# Patient Record
Sex: MALE | Race: WHITE | Employment: OTHER | URBAN - METROPOLITAN AREA
[De-identification: names, ages, dates, MRNs, and addresses within clinical notes are randomized per-mention and may not be internally consistent; named-entity substitution may affect disease eponyms.]

---

## 2018-02-20 PROBLEM — K55.069 MESENTERIC VEIN THROMBOSIS (HCC): Status: ACTIVE | Noted: 2018-02-20

## 2018-02-26 ENCOUNTER — OFFICE VISIT (OUTPATIENT)
Dept: FAMILY MEDICINE CLINIC | Age: 63
End: 2018-02-26

## 2018-02-26 VITALS
SYSTOLIC BLOOD PRESSURE: 128 MMHG | DIASTOLIC BLOOD PRESSURE: 70 MMHG | HEART RATE: 64 BPM | WEIGHT: 197.8 LBS | HEIGHT: 71 IN | BODY MASS INDEX: 27.69 KG/M2 | OXYGEN SATURATION: 97 %

## 2018-02-26 DIAGNOSIS — Z11.59 ENCOUNTER FOR HEPATITIS C SCREENING TEST FOR LOW RISK PATIENT: ICD-10-CM

## 2018-02-26 DIAGNOSIS — E78.2 MIXED HYPERLIPIDEMIA: ICD-10-CM

## 2018-02-26 DIAGNOSIS — K55.069 MESENTERIC VEIN THROMBOSIS (HCC): Primary | ICD-10-CM

## 2018-02-26 DIAGNOSIS — Z11.4 SCREENING FOR HIV (HUMAN IMMUNODEFICIENCY VIRUS): ICD-10-CM

## 2018-02-26 DIAGNOSIS — I10 ESSENTIAL HYPERTENSION: ICD-10-CM

## 2018-02-26 DIAGNOSIS — R93.89 ABNORMAL CHEST CT: ICD-10-CM

## 2018-02-26 DIAGNOSIS — Z12.5 SCREENING PSA (PROSTATE SPECIFIC ANTIGEN): ICD-10-CM

## 2018-02-26 PROCEDURE — 99203 OFFICE O/P NEW LOW 30 MIN: CPT | Performed by: FAMILY MEDICINE

## 2018-02-26 ASSESSMENT — ENCOUNTER SYMPTOMS
RHINORRHEA: 0
COUGH: 0
DIARRHEA: 0
VOMITING: 0
EYE PAIN: 0
SINUS PRESSURE: 0
CHEST TIGHTNESS: 0
WHEEZING: 0
SHORTNESS OF BREATH: 0
BLOOD IN STOOL: 0
CONSTIPATION: 0
EYE DISCHARGE: 0

## 2018-02-26 NOTE — PATIENT INSTRUCTIONS

## 2018-02-27 ENCOUNTER — NURSE ONLY (OUTPATIENT)
Dept: FAMILY MEDICINE CLINIC | Age: 63
End: 2018-02-27

## 2018-02-27 DIAGNOSIS — E78.2 MIXED HYPERLIPIDEMIA: ICD-10-CM

## 2018-02-27 DIAGNOSIS — Z11.59 ENCOUNTER FOR HEPATITIS C SCREENING TEST FOR LOW RISK PATIENT: ICD-10-CM

## 2018-02-27 DIAGNOSIS — I10 ESSENTIAL HYPERTENSION: ICD-10-CM

## 2018-02-27 DIAGNOSIS — Z11.4 SCREENING FOR HIV (HUMAN IMMUNODEFICIENCY VIRUS): ICD-10-CM

## 2018-02-27 LAB
A/G RATIO: 1.8 (ref 1.1–2.2)
ALBUMIN SERPL-MCNC: 4.6 G/DL (ref 3.4–5)
ALP BLD-CCNC: 52 U/L (ref 40–129)
ALT SERPL-CCNC: 41 U/L (ref 10–40)
ANION GAP SERPL CALCULATED.3IONS-SCNC: 18 MMOL/L (ref 3–16)
AST SERPL-CCNC: 30 U/L (ref 15–37)
BILIRUB SERPL-MCNC: 0.6 MG/DL (ref 0–1)
BUN BLDV-MCNC: 26 MG/DL (ref 7–20)
CALCIUM SERPL-MCNC: 9.1 MG/DL (ref 8.3–10.6)
CHLORIDE BLD-SCNC: 100 MMOL/L (ref 99–110)
CHOLESTEROL, TOTAL: 123 MG/DL (ref 0–199)
CO2: 23 MMOL/L (ref 21–32)
CREAT SERPL-MCNC: 1.4 MG/DL (ref 0.8–1.3)
GFR AFRICAN AMERICAN: >60
GFR NON-AFRICAN AMERICAN: 51
GLOBULIN: 2.5 G/DL
GLUCOSE BLD-MCNC: 140 MG/DL (ref 70–99)
HDLC SERPL-MCNC: 33 MG/DL (ref 40–60)
HEPATITIS C ANTIBODY INTERPRETATION: NORMAL
LDL CHOLESTEROL CALCULATED: 51 MG/DL
POTASSIUM SERPL-SCNC: 4.6 MMOL/L (ref 3.5–5.1)
SODIUM BLD-SCNC: 141 MMOL/L (ref 136–145)
TOTAL PROTEIN: 7.1 G/DL (ref 6.4–8.2)
TRIGL SERPL-MCNC: 194 MG/DL (ref 0–150)
TSH REFLEX: 3.33 UIU/ML (ref 0.27–4.2)
VLDLC SERPL CALC-MCNC: 39 MG/DL

## 2018-02-27 PROCEDURE — 36415 COLL VENOUS BLD VENIPUNCTURE: CPT | Performed by: FAMILY MEDICINE

## 2018-02-27 ASSESSMENT — ENCOUNTER SYMPTOMS
ABDOMINAL PAIN: 1
COLOR CHANGE: 0

## 2018-02-28 LAB
HIV AG/AB: NORMAL
HIV ANTIGEN: NORMAL
HIV-1 ANTIBODY: NORMAL
HIV-2 AB: NORMAL

## 2018-03-02 DIAGNOSIS — R73.9 HYPERGLYCEMIA: Primary | ICD-10-CM

## 2018-03-05 ENCOUNTER — NURSE ONLY (OUTPATIENT)
Dept: FAMILY MEDICINE CLINIC | Age: 63
End: 2018-03-05

## 2018-03-05 DIAGNOSIS — R73.9 HYPERGLYCEMIA: ICD-10-CM

## 2018-03-05 PROCEDURE — 36415 COLL VENOUS BLD VENIPUNCTURE: CPT | Performed by: FAMILY MEDICINE

## 2018-03-06 LAB
ESTIMATED AVERAGE GLUCOSE: 154.2 MG/DL
HBA1C MFR BLD: 7 %

## 2018-03-08 ENCOUNTER — HOSPITAL ENCOUNTER (OUTPATIENT)
Dept: SURGERY | Age: 63
Discharge: HOME | End: 2018-03-08
Attending: INTERNAL MEDICINE | Admitting: INTERNAL MEDICINE

## 2018-03-08 VITALS
HEIGHT: 71 IN | OXYGEN SATURATION: 98 % | TEMPERATURE: 98.2 F | SYSTOLIC BLOOD PRESSURE: 163 MMHG | HEART RATE: 55 BPM | WEIGHT: 185 LBS | RESPIRATION RATE: 16 BRPM | DIASTOLIC BLOOD PRESSURE: 81 MMHG | BODY MASS INDEX: 25.9 KG/M2

## 2018-03-08 RX ORDER — SODIUM CHLORIDE, SODIUM LACTATE, POTASSIUM CHLORIDE, CALCIUM CHLORIDE 600; 310; 30; 20 MG/100ML; MG/100ML; MG/100ML; MG/100ML
INJECTION, SOLUTION INTRAVENOUS CONTINUOUS
Status: DISCONTINUED | OUTPATIENT
Start: 2018-03-08 | End: 2018-03-09 | Stop reason: HOSPADM

## 2018-03-08 RX ORDER — SODIUM CHLORIDE, SODIUM LACTATE, POTASSIUM CHLORIDE, CALCIUM CHLORIDE 600; 310; 30; 20 MG/100ML; MG/100ML; MG/100ML; MG/100ML
INJECTION, SOLUTION INTRAVENOUS ONCE
Status: DISCONTINUED | OUTPATIENT
Start: 2018-03-08 | End: 2018-03-08 | Stop reason: SDUPTHER

## 2018-03-08 RX ORDER — LIDOCAINE HYDROCHLORIDE 10 MG/ML
0.1 INJECTION, SOLUTION EPIDURAL; INFILTRATION; INTRACAUDAL; PERINEURAL
Status: DISCONTINUED | OUTPATIENT
Start: 2018-03-08 | End: 2018-03-08 | Stop reason: SDUPTHER

## 2018-03-08 RX ADMIN — SODIUM CHLORIDE, SODIUM LACTATE, POTASSIUM CHLORIDE, CALCIUM CHLORIDE: 600; 310; 30; 20 INJECTION, SOLUTION INTRAVENOUS at 08:08

## 2018-03-08 ASSESSMENT — PAIN SCALES - GENERAL
PAINLEVEL_OUTOF10: 0

## 2018-03-16 ENCOUNTER — OFFICE VISIT (OUTPATIENT)
Dept: FAMILY MEDICINE CLINIC | Age: 63
End: 2018-03-16

## 2018-03-16 VITALS
HEIGHT: 71 IN | SYSTOLIC BLOOD PRESSURE: 138 MMHG | HEART RATE: 62 BPM | DIASTOLIC BLOOD PRESSURE: 60 MMHG | WEIGHT: 193.4 LBS | BODY MASS INDEX: 27.07 KG/M2 | TEMPERATURE: 98.3 F | OXYGEN SATURATION: 99 %

## 2018-03-16 DIAGNOSIS — E11.9 TYPE 2 DIABETES MELLITUS WITHOUT COMPLICATION, WITHOUT LONG-TERM CURRENT USE OF INSULIN (HCC): ICD-10-CM

## 2018-03-16 DIAGNOSIS — H92.02 LEFT EAR PAIN: Primary | ICD-10-CM

## 2018-03-16 DIAGNOSIS — J02.9 ACUTE PHARYNGITIS, UNSPECIFIED ETIOLOGY: ICD-10-CM

## 2018-03-16 PROCEDURE — 99213 OFFICE O/P EST LOW 20 MIN: CPT | Performed by: FAMILY MEDICINE

## 2018-03-16 RX ORDER — AMOXICILLIN 500 MG/1
500 CAPSULE ORAL 2 TIMES DAILY
Qty: 20 CAPSULE | Refills: 0 | Status: SHIPPED | OUTPATIENT
Start: 2018-03-16 | End: 2018-03-26

## 2018-03-16 ASSESSMENT — ENCOUNTER SYMPTOMS
SHORTNESS OF BREATH: 0
WHEEZING: 0
CHEST TIGHTNESS: 0
SORE THROAT: 1
RHINORRHEA: 0
SINUS PRESSURE: 0
COUGH: 0

## 2018-03-16 NOTE — PATIENT INSTRUCTIONS

## 2018-03-16 NOTE — PROGRESS NOTES
 Total Bilirubin 02/27/2018 0.6     Alkaline Phosphatase 02/27/2018 52     ALT 02/27/2018 41*    AST 02/27/2018 30     Globulin 02/27/2018 2.5     Hep C Ab Interp 02/27/2018 Non-reactive     HIV Ag/Ab 02/27/2018 Non-Reactive     HIV-1 Antibody 02/27/2018 Non-Reactive     HIV ANTIGEN 02/27/2018 Non-Reactive     HIV-2 Ab 02/27/2018 Non-Reactive     TSH 02/27/2018 3.33    Admission on 02/20/2018, Discharged on 02/23/2018   Component Date Value    WBC 02/19/2018 14.2*    RBC 02/19/2018 5.48     Hemoglobin 02/19/2018 15.5     Hematocrit 02/19/2018 46.2     MCV 02/19/2018 84.3     MCH 02/19/2018 28.3     MCHC 02/19/2018 33.6     RDW 02/19/2018 13.4     Platelets 43/48/4738 223     MPV 02/19/2018 7.7     Neutrophils % 02/19/2018 91.0     Lymphocytes % 02/19/2018 4.6     Monocytes % 02/19/2018 3.8     Eosinophils % 02/19/2018 0.2     Basophils % 02/19/2018 0.4     Neutrophils # 02/19/2018 12.9*    Lymphocytes # 02/19/2018 0.7*    Monocytes # 02/19/2018 0.5     Eosinophils # 02/19/2018 0.0     Basophils # 02/19/2018 0.1     Sodium 02/19/2018 141     Potassium 02/19/2018 4.4     Chloride 02/19/2018 100     CO2 02/19/2018 26     Anion Gap 02/19/2018 15     Glucose 02/19/2018 192*    BUN 02/19/2018 25*    CREATININE 02/19/2018 1.4*    GFR Non- 02/19/2018 51*    GFR  02/19/2018 >60     Calcium 02/19/2018 9.5     Total Protein 02/19/2018 7.2     Alb 02/19/2018 4.4     Albumin/Globulin Ratio 02/19/2018 1.6     Total Bilirubin 02/19/2018 0.6     Alkaline Phosphatase 02/19/2018 33*    ALT 02/19/2018 19     AST 02/19/2018 19     Globulin 02/19/2018 2.8     Color, UA 02/20/2018 Yellow     Clarity, UA 02/20/2018 Clear     Glucose, Ur 02/20/2018 Negative     Bilirubin Urine 02/20/2018 Negative     Ketones, Urine 02/20/2018 TRACE*    Specific Gravity, UA 02/20/2018 1.020     Blood, Urine 02/20/2018 Negative     pH, UA 02/20/2018 7.0     Protein, UA 02/20/2018 Negative     Urobilinogen, Urine 02/20/2018 0.2     Nitrite, Urine 02/20/2018 Negative     Leukocyte Esterase, Urine 02/20/2018 Negative     Microscopic Examination 02/20/2018 Not Indicated     Urine Type 02/20/2018 Not Specified     Protime 02/19/2018 13.5*    INR 02/19/2018 1.19*    Lactic Acid 02/20/2018 2.3*    aPTT 02/20/2018 34.3     aPTT 02/20/2018 >248.0*    Specimen Status 02/20/2018 ZOIE     Lactic Acid 02/20/2018 1.6     aPTT 02/20/2018 74.4*    Prothrombin Mutation 02/20/2018 Negative     PT PCR Specimen 02/20/2018 Whole Blood     JAK2 V617F MUTATION 02/20/2018 0.0     PTT D 02/20/2018 97*    PT D 02/20/2018 15.9*    Thrombin Time 02/20/2018 61.7*    Reptilase Tm 02/20/2018 19.6     Ptt-D Lizzy Reflex 02/20/2018 46     dRVVT Screen 02/20/2018 31*    DRVVT,DIL 95/08/6256 Not Applicable     Lup Anticoag Interp 02/20/2018 See Note     Hex Phosph Neut Test 54/18/3391 Not Applicable     Ptt-Heparin Neutralized 02/20/2018 51*    PLT NEUTA 70/45/4950 Not Applicable     DRVVT Confirmation Test 37/58/2569 Not Applicable     Lipase 44/39/9188 19.0     WBC 02/21/2018 17.0*    RBC 02/21/2018 4.82     Hemoglobin 02/21/2018 13.6     Hematocrit 02/21/2018 40.6     MCV 02/21/2018 84.1     MCH 02/21/2018 28.2     MCHC 02/21/2018 33.6     RDW 02/21/2018 13.8     Platelets 00/87/0760 177     MPV 02/21/2018 7.9     Neutrophils % 02/21/2018 85.8     Lymphocytes % 02/21/2018 7.6     Monocytes % 02/21/2018 5.8     Eosinophils % 02/21/2018 0.3     Basophils % 02/21/2018 0.5     Neutrophils # 02/21/2018 14.6*    Lymphocytes # 02/21/2018 1.3     Monocytes # 02/21/2018 1.0     Eosinophils # 02/21/2018 0.0     Basophils # 02/21/2018 0.1     Sodium 02/21/2018 139     Potassium reflex Magnesi* 02/21/2018 4.1     Chloride 02/21/2018 104     CO2 02/21/2018 25     Anion Gap 02/21/2018 10     Glucose 02/21/2018 146*    BUN 02/21/2018 28*    CREATININE 02/21/2018 1.1

## 2018-04-30 PROBLEM — R10.9 CHRONIC ABDOMINAL PAIN: Status: ACTIVE | Noted: 2018-04-30

## 2018-04-30 PROBLEM — R10.9 CHRONIC ABDOMINAL PAIN: Chronic | Status: ACTIVE | Noted: 2018-04-30

## 2018-04-30 PROBLEM — G89.29 CHRONIC ABDOMINAL PAIN: Chronic | Status: ACTIVE | Noted: 2018-04-30

## 2018-04-30 PROBLEM — K55.069 MESENTERIC VEIN THROMBOSIS (HCC): Chronic | Status: ACTIVE | Noted: 2018-02-20

## 2018-04-30 PROBLEM — G89.29 CHRONIC ABDOMINAL PAIN: Status: ACTIVE | Noted: 2018-04-30

## 2018-09-21 ENCOUNTER — HOSPITAL ENCOUNTER (OUTPATIENT)
Dept: CT IMAGING | Age: 63
Discharge: HOME | End: 2018-09-21
Payer: COMMERCIAL

## 2018-09-21 ENCOUNTER — HOSPITAL ENCOUNTER (OUTPATIENT)
Age: 63
Discharge: HOME | End: 2018-09-21
Payer: COMMERCIAL

## 2018-09-21 DIAGNOSIS — I81 PORTAL VEIN THROMBOSIS: ICD-10-CM

## 2018-09-21 LAB
A/G RATIO: 1.7 (ref 1.1–2.2)
ALBUMIN SERPL-MCNC: 4.6 G/DL (ref 3.4–5)
ALP BLD-CCNC: 27 U/L (ref 40–129)
ALT SERPL-CCNC: 24 U/L (ref 10–40)
ANION GAP SERPL CALCULATED.3IONS-SCNC: 12 MMOL/L (ref 3–16)
AST SERPL-CCNC: 24 U/L (ref 15–37)
BILIRUB SERPL-MCNC: 1.1 MG/DL (ref 0–1)
BUN BLDV-MCNC: 23 MG/DL (ref 7–20)
CALCIUM SERPL-MCNC: 9.2 MG/DL (ref 8.3–10.6)
CHLORIDE BLD-SCNC: 101 MMOL/L (ref 99–110)
CO2: 27 MMOL/L (ref 21–32)
CREAT SERPL-MCNC: 1.2 MG/DL (ref 0.8–1.3)
GFR AFRICAN AMERICAN: >60
GFR NON-AFRICAN AMERICAN: >60
GLOBULIN: 2.7 G/DL
GLUCOSE BLD-MCNC: 129 MG/DL (ref 70–99)
POTASSIUM SERPL-SCNC: 4.1 MMOL/L (ref 3.5–5.1)
SODIUM BLD-SCNC: 140 MMOL/L (ref 136–145)
TOTAL PROTEIN: 7.3 G/DL (ref 6.4–8.2)

## 2018-09-21 PROCEDURE — 80053 COMPREHEN METABOLIC PANEL: CPT

## 2018-09-21 PROCEDURE — 36415 COLL VENOUS BLD VENIPUNCTURE: CPT

## 2018-09-21 PROCEDURE — 6360000004 HC RX CONTRAST MEDICATION: Performed by: INTERNAL MEDICINE

## 2018-09-21 PROCEDURE — 74177 CT ABD & PELVIS W/CONTRAST: CPT

## 2018-09-21 RX ADMIN — IOPAMIDOL 100 ML: 755 INJECTION, SOLUTION INTRAVENOUS at 09:53

## 2018-09-21 RX ADMIN — IOHEXOL 50 ML: 240 INJECTION, SOLUTION INTRATHECAL; INTRAVASCULAR; INTRAVENOUS; ORAL at 09:53

## 2019-04-04 NOTE — PROGRESS NOTES
Reason for follow up:  ACS, AF, influenza    Subjective:  Feeling better. SOB is slowly improving. No CP. Slept ok. Objective:    No distress. No events overnight. Scheduled Meds:   levofloxacin  750 mg Intravenous Q48H    nystatin   Topical BID    aspirin  81 mg Oral Daily    docusate sodium  100 mg Oral Daily    predniSONE  40 mg Oral Lunch    heparin flush  3 mL Intravenous 2 times per day    warfarin (COUMADIN) daily dosing (placeholder)   Other RX Placeholder    ipratropium-albuterol  3 mL Inhalation Q4H WA    atorvastatin  10 mg Oral Daily    metoprolol tartrate  25 mg Oral BID    levothyroxine  75 mcg Oral Daily    budesonide  1 mg Nebulization BID    formoterol  20 mcg Nebulization Q12H         Intake/Output Summary (Last 24 hours) at 4/4/2019 1011  Last data filed at 4/4/2019 0531  Gross per 24 hour   Intake 458 ml   Output 0 ml   Net 458 ml       Patient Vitals for the past 96 hrs (Last 3 readings):   Weight   04/04/19 0531 88 lb 12.8 oz (40.3 kg)   04/03/19 0600 92 lb 1.6 oz (41.8 kg)   04/02/19 0542 103 lb 9.9 oz (47 kg)          PE:   BP (!) 134/90   Pulse 86   Temp 98.1 °F (36.7 °C) (Temporal)   Resp 18   Ht 5' (1.524 m)   Wt 88 lb 12.8 oz (40.3 kg)   SpO2 100%   BMI 17.34 kg/m²   CONST: In general, this is a well developed, elderly female who appears of stated age. Awake, alert, cooperative, no apparent distress. Throat: Oral mucosa pink and moist.  HEENT: Head- normocephalic, atraumatic; Eyes:conjunctivae pink, no noted xanthomas. Neck: no stridor, no noted enlargement of the thyroid,symmetric, no tenderness, no jugular venous distention. No carotid bruit noted. CHEST: Symmetrical and non-tender to palpation. No noted accessory muscle use or intercostal retractions. LUNGS: diminished AE b/l; scattered creps; diffuse ronchi.    CARDIOVASCULAR:   Heart Inspection shows no noted pulsations  Heart Palpation: no heaves or thrills, PMI in 5th Subjective:      Patient ID: Tiffany Brooke is a 60 y.o. male. HPI  Chief Complaint   Patient presents with    New Patient     Patient is here to follow up after a recent discharge from [facility] on 2/23/18.  Follow-Up from [address] [facility] with HLD,HTN. Moved from [place] last year. Was being seen by [facility] but with new insurance so has to switch   presented to [facility] with several weeks of abd pain, worsened by food intake. No prior hx of abd pathology, he has hx of HTN. CT in ED showed mesenteric and portal vein thrombosis, started on heparin gtt.  No prior hx or FH of thrombosis. No hx of cancer, tobacco usage. Mike Byers is a 60 y.o. 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 20 tablet 0    rivaroxaban (XARELTO STARTER PACK) 15 & 20 MG Starter Pack Use as directed 1 each 0    simvastatin (ZOCOR) 20 MG tablet Take 20 mg by mouth nightly      lisinopril (PRINIVIL;ZESTRIL) 10 MG tablet Take 10 mg by mouth daily      fenofibrate (TRICOR) 145 MG tablet Take 145 mg by mouth daily       No current facility-administered medications for this visit. Allergies: Patient has no known allergies.   Past Medical History:   Diagnosis Date    Hyperlipidemia     Hypertension     Thrombosis      Past Surgical History:   Procedure Laterality Date    ANKLE SURGERY      left    SHOULDER SURGERY Right 2012    TONSILLECTOMY      WRIST SURGERY      right     Family History   Problem Relation Age of Onset    Breast Cancer Mother     Coronary Art Dis Father     Diabetes Sister      Social History   Substance Use Topics    Smoking status: Never Smoker    Smokeless tobacco: ISC near left midclavicular line   Heart Ausculation -Normal S1 and S2, RRR, no murmur, s3, s4 or rub noted. PV: no extremity edema. No varicosities. Pedal pulses palpable, no clubbing or cyanosis   ABDOMEN: Soft, non-tender to light palpation. Bowel sounds present. No palpable masses no hepatosplenomegaly or splenomegaly; no abdominal bruit / pulsation  MS: Good muscle strength and tone. Not atrophy or abnormal movements. : deferred. SKIN: Warm and dry no statis dermatitis or ulcers. NEURO / PSYCH: Oriented to person, place and time. Speech clear and appropriate. Follows all commands. Pleasant affect. Monitor: AF      Lab Review       Recent Labs     04/02/19  0440 04/03/19  0540   WBC 13.6* 15.3*   HGB 8.5* 10.1*   HCT 28.3* 33.8*    403       Recent Labs     04/01/19  2036 04/02/19  0440 04/03/19  0540    144 147*   K 4.9 4.3 4.3    105 104   CO2 31* 28 28   PHOS  --  2.5 2.2*   BUN 43* 40* 43*   CREATININE 1.0 1.0 0.9       Recent Labs     04/03/19  0540   AST 39*   ALT 58*   ALKPHOS 96           Last 3 Troponin:    Lab Results   Component Value Date    TROPONINI 0.23 04/02/2019    TROPONINI 0.09 04/01/2019    TROPONINI 0.04 03/22/2019         Recent Labs     04/02/19  0440 04/03/19  0540 04/04/19  0713   INR 4.9 5.4* 5.8*     Assessment:  1. Presented with hypoxic respiratory failure in setting of + meta-pneumovirus. Improved. Presently on high flow NC. Respiratory status improving. No CHF on exam. Steriod taper, prn BIPAP. Pulmonary following. 2. CP with CHRISTA on EKG, 4/1/19: no aggressive measures as per family. Will therefore pursue medical therapy. ASA, BB. Probably stent thrombosis of the prior RCA stent. Presently symptom free. Enzyme pattern not consistent with large MI.   3. CAD s/p PCI to RCA 2004. On BB and lipitor as OP. 4. PAF: on coumadin. Was SR on presentation. AF on last EKG. INR high. 5. HTN: controlled presently. Was hypotensive during the acute episode. Never Used    Alcohol use Yes      Comment: social     Vitals:    02/26/18 1434   BP: 128/70   Site: Left Arm   Position: Sitting   Pulse: 64   SpO2: 97%   Weight: 197 lb 12.8 oz (89.7 kg)   Height: 5' 11\" (1.803 m)     Body mass index is 27.59 kg/m².      Wt Readings from Last 3 Encounters:   02/26/18 197 lb 12.8 oz (89.7 kg)   02/19/18 199 lb (90.3 kg)     BP Readings from Last 3 Encounters:   02/26/18 128/70   02/23/18 (!) 156/82      Lab Review   Admission on 02/20/2018, Discharged on 02/23/2018   Component Date Value    WBC 02/19/2018 14.2*    RBC 02/19/2018 5.48     Hemoglobin 02/19/2018 15.5     Hematocrit 02/19/2018 46.2     MCV 02/19/2018 84.3     MCH 02/19/2018 28.3     MCHC 02/19/2018 33.6     RDW 02/19/2018 13.4     Platelets 96/28/9819 223     MPV 02/19/2018 7.7     Neutrophils % 02/19/2018 91.0     Lymphocytes % 02/19/2018 4.6     Monocytes % 02/19/2018 3.8     Eosinophils % 02/19/2018 0.2     Basophils % 02/19/2018 0.4     Neutrophils # 02/19/2018 12.9*    Lymphocytes # 02/19/2018 0.7*    Monocytes # 02/19/2018 0.5     Eosinophils # 02/19/2018 0.0     Basophils # 02/19/2018 0.1     Sodium 02/19/2018 141     Potassium 02/19/2018 4.4     Chloride 02/19/2018 100     CO2 02/19/2018 26     Anion Gap 02/19/2018 15     Glucose 02/19/2018 192*    BUN 02/19/2018 25*    CREATININE 02/19/2018 1.4*    GFR Non- 02/19/2018 51*    GFR  02/19/2018 >60     Calcium 02/19/2018 9.5     Total Protein 02/19/2018 7.2     Alb 02/19/2018 4.4     Albumin/Globulin Ratio 02/19/2018 1.6     Total Bilirubin 02/19/2018 0.6     Alkaline Phosphatase 02/19/2018 33*    ALT 02/19/2018 19     AST 02/19/2018 19     Globulin 02/19/2018 2.8     Color, UA 02/20/2018 Yellow     Clarity, UA 02/20/2018 Clear     Glucose, Ur 02/20/2018 Negative     Bilirubin Urine 02/20/2018 Negative     Ketones, Urine 02/20/2018 TRACE*    Specific Gravity, UA 02/20/2018 1.020     6. COPD on chronic home O2.   7. L parietal SDH secondary to fall 12/2018        Plan:  1. Continue BB, ASA  2. [id number] on hold secondary to INR  3. ECHO is pending  4. Pulmonary following for respiratory insufficiency        Dr. Clem Nagy MD.  [facility]. Glucose 02/21/2018 146*    BUN 02/21/2018 28*    CREATININE 02/21/2018 1.1     GFR Non- 02/21/2018 >60     GFR  02/21/2018 >60     Calcium 02/21/2018 8.0*    Total Protein 02/21/2018 6.2*    Alb 02/21/2018 3.5     Albumin/Globulin Ratio 02/21/2018 1.3     Total Bilirubin 02/21/2018 0.6     Alkaline Phosphatase 02/21/2018 30*    ALT 02/21/2018 12     AST 02/21/2018 14*    Globulin 02/21/2018 2.7     aPTT 02/21/2018 73.9*    PTT D 02/21/2018 120*    PT D 02/21/2018 16.2*    Thrombin Time 02/21/2018 40.3*    Reptilase Tm 02/21/2018 18.6     Ptt-D Lizzy Reflex 02/21/2018 55*    dRVVT Screen 02/21/2018 42     DRVVT,DIL 55/17/7712 Not Applicable     Lup Anticoag Interp 02/21/2018 See Note     Hex Phosph Neut Test 02/21/2018 Positive*    Ptt-Heparin Neutralized 02/21/2018 63*    PLT NEUTA 02/21/2018 Negative     DRVVT Confirmation Test 33/76/2225 Not Applicable     Anticardiolipin IgG 02/21/2018 2     Cardiolipin Ab IgM 02/21/2018 6     Beta-2 Microglobulin 02/21/2018 1.8     Factor V Leiden 02/21/2018 Negative     Specimen 02/21/2018 Whole Blood     PSA 02/21/2018 0.52     Beta-2 Glyco 1 IgG 02/21/2018 0     Beta-2 Glyco 1 IgM 02/21/2018 1      EXAMINATION:   CT OF THE CHEST WITHOUT CONTRAST 2/21/2018 9:34 am       TECHNIQUE:   CT of the chest was performed without the administration of intravenous   contrast. Multiplanar reformatted images are provided for review. Dose   modulation, iterative reconstruction, and/or weight based adjustment of the   mA/kV was utilized to reduce the radiation dose to as low as reasonably   achievable.       COMPARISON:   None.       HISTORY:   ORDERING PHYSICIAN PROVIDED HISTORY: WIDENING OF MEDIASTINUM OF X-RAY   TECHNOLOGIST PROVIDED HISTORY:   Technologist Provided Reason for Exam: widening of mediastinum of xray;   SMV/portal vein thrmobus. Screening for malig that could be driving this.    Acuity: Acute   Type of dizziness, seizures, syncope and headaches. Hematological: Negative for adenopathy. Does not bruise/bleed easily. Psychiatric/Behavioral: Negative for dysphoric mood and sleep disturbance. The patient is not nervous/anxious. Objective:   Physical Exam   Constitutional: He is oriented to person, place, and time. He appears well-developed and well-nourished. No distress. HENT:   Head: Normocephalic. Right Ear: External ear normal.   Left Ear: External ear normal.   Nose: Nose normal.   Mouth/Throat: Oropharynx is clear and moist. No oropharyngeal exudate. Eyes: Conjunctivae and EOM are normal. Pupils are equal, round, and reactive to light. No scleral icterus. Neck: Neck supple. No thyromegaly present. Cardiovascular: Normal rate, regular rhythm, normal heart sounds and intact distal pulses. No murmur heard. Pulmonary/Chest: Effort normal and breath sounds normal. He has no wheezes. Abdominal: Soft. Bowel sounds are normal. He exhibits no distension. There is no tenderness. There is no rebound and no guarding. Musculoskeletal: Normal range of motion. He exhibits no edema, tenderness or deformity. Lymphadenopathy:     He has no cervical adenopathy. Neurological: He is alert and oriented to person, place, and time. No cranial nerve deficit. Coordination normal.   Skin: Skin is warm and dry. No erythema. Psychiatric: He has a normal mood and affect.  His behavior is normal. Judgment and thought content normal.       Assessment:       Mesenteric thrombus- on xarelto, workup unrevealing for cause  Abn chest CT- no malignancy, b/l opacities- not treated for PNA, will recheck in 4 weeks  htn- stable on meds, check labs  hld- stable on meds, check labs          Plan:      Records from previous PCP- states has had immunizations  Reviewed Noah Palm   reconcilled meds  Orders Placed This Encounter   Procedures    LIPID PANEL     Standing Status:   Future     Standing Expiration Date:

## 2019-12-02 ENCOUNTER — HOSPITAL ENCOUNTER (OUTPATIENT)
Age: 64
Discharge: HOME | End: 2019-12-02
Payer: MEDICARE

## 2019-12-02 DIAGNOSIS — N18.30 CKD (CHRONIC KIDNEY DISEASE), STAGE III (HCC): ICD-10-CM

## 2019-12-02 LAB
ALBUMIN SERPL-MCNC: 5 G/DL (ref 3.4–5)
ANION GAP SERPL CALCULATED.3IONS-SCNC: 15 MMOL/L (ref 3–16)
BUN BLDV-MCNC: 27 MG/DL (ref 7–20)
CALCIUM SERPL-MCNC: 9.6 MG/DL (ref 8.3–10.6)
CHLORIDE BLD-SCNC: 100 MMOL/L (ref 99–110)
CO2: 25 MMOL/L (ref 21–32)
CREAT SERPL-MCNC: 1.3 MG/DL (ref 0.8–1.3)
CREATININE URINE: 178.2 MG/DL (ref 39–259)
GFR AFRICAN AMERICAN: >60
GFR NON-AFRICAN AMERICAN: 55
GLUCOSE BLD-MCNC: 107 MG/DL (ref 70–99)
MICROALBUMIN UR-MCNC: 1.4 MG/DL
MICROALBUMIN/CREAT UR-RTO: 7.9 MG/G (ref 0–30)
PHOSPHORUS: 3.1 MG/DL (ref 2.5–4.9)
POTASSIUM SERPL-SCNC: 4.6 MMOL/L (ref 3.5–5.1)
SODIUM BLD-SCNC: 140 MMOL/L (ref 136–145)

## 2019-12-02 PROCEDURE — 36415 COLL VENOUS BLD VENIPUNCTURE: CPT

## 2019-12-02 PROCEDURE — 80069 RENAL FUNCTION PANEL: CPT

## 2019-12-02 PROCEDURE — 82043 UR ALBUMIN QUANTITATIVE: CPT

## 2019-12-02 PROCEDURE — 82570 ASSAY OF URINE CREATININE: CPT

## 2019-12-09 ENCOUNTER — OFFICE VISIT (OUTPATIENT)
Dept: CARDIOLOGY CLINIC | Age: 64
End: 2019-12-09
Payer: MEDICARE

## 2019-12-09 VITALS
SYSTOLIC BLOOD PRESSURE: 148 MMHG | BODY MASS INDEX: 29.26 KG/M2 | DIASTOLIC BLOOD PRESSURE: 78 MMHG | WEIGHT: 209 LBS | HEART RATE: 67 BPM | HEIGHT: 71 IN | OXYGEN SATURATION: 97 %

## 2019-12-09 DIAGNOSIS — I10 ESSENTIAL HYPERTENSION: Primary | ICD-10-CM

## 2019-12-09 DIAGNOSIS — E78.2 MIXED HYPERLIPIDEMIA: ICD-10-CM

## 2019-12-09 PROCEDURE — 99203 OFFICE O/P NEW LOW 30 MIN: CPT | Performed by: INTERNAL MEDICINE

## 2019-12-09 PROCEDURE — 1036F TOBACCO NON-USER: CPT | Performed by: INTERNAL MEDICINE

## 2019-12-09 PROCEDURE — G8417 CALC BMI ABV UP PARAM F/U: HCPCS | Performed by: INTERNAL MEDICINE

## 2019-12-09 PROCEDURE — 1123F ACP DISCUSS/DSCN MKR DOCD: CPT | Performed by: INTERNAL MEDICINE

## 2019-12-09 PROCEDURE — G8427 DOCREV CUR MEDS BY ELIG CLIN: HCPCS | Performed by: INTERNAL MEDICINE

## 2019-12-09 PROCEDURE — 93000 ELECTROCARDIOGRAM COMPLETE: CPT | Performed by: INTERNAL MEDICINE

## 2019-12-09 PROCEDURE — 3017F COLORECTAL CA SCREEN DOC REV: CPT | Performed by: INTERNAL MEDICINE

## 2019-12-09 PROCEDURE — 4040F PNEUMOC VAC/ADMIN/RCVD: CPT | Performed by: INTERNAL MEDICINE

## 2019-12-09 PROCEDURE — G8484 FLU IMMUNIZE NO ADMIN: HCPCS | Performed by: INTERNAL MEDICINE

## 2019-12-09 RX ORDER — AMLODIPINE BESYLATE 2.5 MG/1
2.5 TABLET ORAL DAILY
Qty: 90 TABLET | Refills: 1 | Status: SHIPPED | OUTPATIENT
Start: 2019-12-09 | End: 2020-03-09 | Stop reason: SDUPTHER

## 2020-02-11 ENCOUNTER — TELEPHONE (OUTPATIENT)
Dept: CARDIOLOGY CLINIC | Age: 65
End: 2020-02-11

## 2020-02-11 RX ORDER — LISINOPRIL 5 MG/1
5 TABLET ORAL DAILY
Qty: 90 TABLET | Refills: 3 | Status: SHIPPED | OUTPATIENT
Start: 2020-02-11 | End: 2020-03-09 | Stop reason: SDUPTHER

## 2020-02-20 ENCOUNTER — HOSPITAL ENCOUNTER (OUTPATIENT)
Age: 65
Discharge: HOME | End: 2020-02-20
Payer: MEDICARE

## 2020-02-20 LAB
ALBUMIN SERPL-MCNC: 4.9 G/DL (ref 3.4–5)
ALP BLD-CCNC: 24 U/L (ref 40–129)
ALT SERPL-CCNC: 43 U/L (ref 10–40)
ANION GAP SERPL CALCULATED.3IONS-SCNC: 13 MMOL/L (ref 3–16)
AST SERPL-CCNC: 38 U/L (ref 15–37)
BILIRUB SERPL-MCNC: 0.7 MG/DL (ref 0–1)
BILIRUBIN DIRECT: <0.2 MG/DL (ref 0–0.3)
BILIRUBIN, INDIRECT: ABNORMAL MG/DL (ref 0–1)
BUN BLDV-MCNC: 28 MG/DL (ref 7–20)
CALCIUM SERPL-MCNC: 9.6 MG/DL (ref 8.3–10.6)
CHLORIDE BLD-SCNC: 104 MMOL/L (ref 99–110)
CHOLESTEROL, TOTAL: 130 MG/DL (ref 0–199)
CO2: 27 MMOL/L (ref 21–32)
CREAT SERPL-MCNC: 1.6 MG/DL (ref 0.8–1.3)
GFR AFRICAN AMERICAN: 53
GFR NON-AFRICAN AMERICAN: 44
GLUCOSE BLD-MCNC: 111 MG/DL (ref 70–99)
HDLC SERPL-MCNC: 41 MG/DL (ref 40–60)
LDL CHOLESTEROL CALCULATED: 67 MG/DL
PHOSPHORUS: 3 MG/DL (ref 2.5–4.9)
POTASSIUM SERPL-SCNC: 4.8 MMOL/L (ref 3.5–5.1)
SODIUM BLD-SCNC: 144 MMOL/L (ref 136–145)
TOTAL PROTEIN: 7.5 G/DL (ref 6.4–8.2)
TRIGL SERPL-MCNC: 112 MG/DL (ref 0–150)
VLDLC SERPL CALC-MCNC: 22 MG/DL

## 2020-02-20 PROCEDURE — 80048 BASIC METABOLIC PNL TOTAL CA: CPT

## 2020-02-20 PROCEDURE — 36415 COLL VENOUS BLD VENIPUNCTURE: CPT

## 2020-02-20 PROCEDURE — 80061 LIPID PANEL: CPT

## 2020-02-20 PROCEDURE — 84100 ASSAY OF PHOSPHORUS: CPT

## 2020-02-20 PROCEDURE — 80076 HEPATIC FUNCTION PANEL: CPT

## 2020-02-21 ENCOUNTER — TELEPHONE (OUTPATIENT)
Dept: CARDIOLOGY CLINIC | Age: 65
End: 2020-02-21

## 2020-03-09 ENCOUNTER — OFFICE VISIT (OUTPATIENT)
Dept: CARDIOLOGY CLINIC | Age: 65
End: 2020-03-09
Payer: MEDICARE

## 2020-03-09 VITALS
BODY MASS INDEX: 28.53 KG/M2 | DIASTOLIC BLOOD PRESSURE: 60 MMHG | HEART RATE: 55 BPM | OXYGEN SATURATION: 99 % | SYSTOLIC BLOOD PRESSURE: 112 MMHG | HEIGHT: 71 IN | WEIGHT: 203.8 LBS

## 2020-03-09 PROBLEM — N18.30 STAGE 3 CHRONIC KIDNEY DISEASE (HCC): Status: ACTIVE | Noted: 2020-03-09

## 2020-03-09 PROCEDURE — 3017F COLORECTAL CA SCREEN DOC REV: CPT | Performed by: NURSE PRACTITIONER

## 2020-03-09 PROCEDURE — 4040F PNEUMOC VAC/ADMIN/RCVD: CPT | Performed by: NURSE PRACTITIONER

## 2020-03-09 PROCEDURE — G8427 DOCREV CUR MEDS BY ELIG CLIN: HCPCS | Performed by: NURSE PRACTITIONER

## 2020-03-09 PROCEDURE — G8484 FLU IMMUNIZE NO ADMIN: HCPCS | Performed by: NURSE PRACTITIONER

## 2020-03-09 PROCEDURE — G8417 CALC BMI ABV UP PARAM F/U: HCPCS | Performed by: NURSE PRACTITIONER

## 2020-03-09 PROCEDURE — 99213 OFFICE O/P EST LOW 20 MIN: CPT | Performed by: NURSE PRACTITIONER

## 2020-03-09 PROCEDURE — 1123F ACP DISCUSS/DSCN MKR DOCD: CPT | Performed by: NURSE PRACTITIONER

## 2020-03-09 PROCEDURE — 1036F TOBACCO NON-USER: CPT | Performed by: NURSE PRACTITIONER

## 2020-03-09 RX ORDER — SIMVASTATIN 20 MG
20 TABLET ORAL NIGHTLY
Qty: 90 TABLET | Refills: 3 | Status: SHIPPED | OUTPATIENT
Start: 2020-03-09 | End: 2021-01-26 | Stop reason: SDUPTHER

## 2020-03-09 RX ORDER — AMLODIPINE BESYLATE 2.5 MG/1
2.5 TABLET ORAL DAILY
Qty: 90 TABLET | Refills: 3 | Status: SHIPPED | OUTPATIENT
Start: 2020-03-09 | End: 2020-09-16 | Stop reason: SDUPTHER

## 2020-03-09 RX ORDER — FENOFIBRATE 145 MG/1
145 TABLET, COATED ORAL DAILY
Qty: 90 TABLET | Refills: 3 | Status: SHIPPED | OUTPATIENT
Start: 2020-03-09 | End: 2021-01-26 | Stop reason: SDUPTHER

## 2020-03-09 RX ORDER — LISINOPRIL 5 MG/1
5 TABLET ORAL DAILY
Qty: 90 TABLET | Refills: 3 | Status: SHIPPED | OUTPATIENT
Start: 2020-03-09 | End: 2020-10-12 | Stop reason: SDUPTHER

## 2020-03-09 NOTE — PROGRESS NOTES
[address]   Cardiac Evaluation    Primary Care Doctor:  [facility] DO DOLORES    Chief Complaint   Patient presents with    3 Month Follow-Up     no new cardiac complaints        History of Present Illness:   I had the pleasure of seeing Rema Vinson in follow up for HTN, HLD as well as CKD3 and hx of mesenteric vein thrombosis. He was started on Norvasc 2.5 mg for hypertension. Blood work last month with lipid panel at goal but mildly worsening renal function. He follows with Dr. Mark Bello for CKD3. BP was elevated initially today then dropped with sitting for couple minutes. Wife is having shoulder surgery today. No problems with the amlodipine. His weight is down 6 lbs in past 3 months. He states he historically gains and loses 15 lbs in a cyclic pattern in fall/ winter. He has been retired for 13 years. His diet has improved since then as well as cut back on alcohol intake. He took a week of meloxicam per podiatrist for right toe arthritis but stopped this when renal function looked worse. Has appointment with Dr. Mark Bello next month. Rema Vinson describes symptoms including right foot pain but denies chest pain, dyspnea, palpitations, orthopnea, PND, fatigue, early saiety, edema, syncope. NYHA:   II  ACC/ AHA Stage:    B    Past Medical History:   has a past medical history of Hyperlipidemia, Hypertension, and Thrombosis. Surgical History:   has a past surgical history that includes Ankle surgery; Wrist surgery; Tonsillectomy; shoulder surgery (Right, 2012); and Colonoscopy (03/08/2017). Social History:   reports that he has never smoked. He has never used smokeless tobacco. He reports current alcohol use. He reports that he does not use drugs.    Family History:   Family History   Problem Relation Age of Onset    Breast Cancer Mother     Coronary Art Dis Father     Diabetes Sister        Home Medications:  Prior to Admission medications    Medication Sig Start Date End CAD patient on STATIN therapy:  Yes  6.  Patient with CHF and aFib on anticoagulation:  NA

## 2020-03-09 NOTE — PATIENT INSTRUCTIONS
1. No change in BP/ cholesterol medicines  2. Follow up with Dr. Tisha Jean next month as planned  3. Follow up with Dr. Rigoberto Soliman in 6 months        Ref.  Range 2/20/2020 10:45   Cholesterol, Total Latest Ref Range: 0 - 199 mg/dL 130   HDL Cholesterol Latest Ref Range: 40 - 60 mg/dL 41   LDL Calculated Latest Ref Range: <100 mg/dL 67   Triglycerides Latest Ref Range: 0 - 150 mg/dL 112

## 2020-03-09 NOTE — LETTER
[address]   Cardiac Evaluation    Primary Care Doctor:  [facility] DO DOLORES    Chief Complaint   Patient presents with    3 Month Follow-Up     no new cardiac complaints        History of Present Illness:   I had the pleasure of seeing Vanita Sims in follow up for HTN, HLD as well as CKD3 and hx of mesenteric vein thrombosis. He was started on Norvasc 2.5 mg for hypertension. Blood work last month with lipid panel at goal but mildly worsening renal function. He follows with Dr. Rosalinda Thurman for CKD3. BP was elevated initially today then dropped with sitting for couple minutes. Wife is having shoulder surgery today. No problems with the amlodipine. His weight is down 6 lbs in past 3 months. He states he historically gains and loses 15 lbs in a cyclic pattern in fall/ winter. He has been retired for 13 years. His diet has improved since then as well as cut back on alcohol intake. He took a week of meloxicam per podiatrist for right toe arthritis but stopped this when renal function looked worse. Has appointment with Dr. Rosalinda Thurman next month. Vanita Sims describes symptoms including right foot pain but denies chest pain, dyspnea, palpitations, orthopnea, PND, fatigue, early saiety, edema, syncope. NYHA:   II  ACC/ AHA Stage:    B    Past Medical History:   has a past medical history of Hyperlipidemia, Hypertension, and Thrombosis. Surgical History:   has a past surgical history that includes Ankle surgery; Wrist surgery; Tonsillectomy; shoulder surgery (Right, 2012); and Colonoscopy (03/08/2017). Social History:   reports that he has never smoked. He has never used smokeless tobacco. He reports current alcohol use. He reports that he does not use drugs.    Family History:   Family History   Problem Relation Age of Onset    Breast Cancer Mother     Coronary Art Dis Father     Diabetes Sister        Home Medications:  Prior to Admission medications Medication Sig Start Date End Date Taking? Authorizing Provider   simvastatin (ZOCOR) 20 MG tablet TAKE 1 TABLET BY MOUTH AT BEDTIME 3/3/20  Yes Eugene Bah MD   lisinopril (PRINIVIL;ZESTRIL) 5 MG tablet Take 1 tablet by mouth daily 2/11/20  Yes Reggie Jimenez MD   fenofibrate (TRICOR) 145 MG tablet TAKE 1 TABLET BY MOUTH ONCE DAILY 12/14/19  Yes Eugene Bah MD   amLODIPine (NORVASC) 2.5 MG tablet Take 1 tablet by mouth daily 12/9/19  Yes Reggie Jimenez MD        Allergies:  Patient has no known allergies. Review of Systems:   Review of Systems   Constitutional: Positive for activity change. Negative for appetite change. Eyes: Positive for visual disturbance. Respiratory: Negative for cough, shortness of breath and wheezing. Cardiovascular: Negative for chest pain, palpitations and leg swelling. Genitourinary: Negative. Musculoskeletal: Positive for arthralgias and joint swelling. Neurological: Negative for dizziness, syncope and weakness. Psychiatric/Behavioral: Negative for sleep disturbance. Physical Examination:    Vitals:    03/09/20 1034 03/09/20 1037   BP: (!) 140/60 112/60   Pulse: 55    SpO2: 99%    Weight: 203 lb 12.8 oz (92.4 kg)    Height: 5' 11\" (1.803 m)         Constitutional and General Appearance: Warm and dry, no apparent distress, normal coloration  HEENT:  Normocephalic, atraumatic  Respiratory:  · Normal excursion and expansion without use of accessory muscles  · Resp Auscultation: Clear to auscultation   Cardiovascular:  · The apical impulses not displaced  · Heart tones are crisp and normal  · JVP 8 cm H2O  · Regular rate and rhythm, normal S1S2, no m/g/r  · Peripheral pulses are symmetrical and full  · There is no clubbing, cyanosis of the extremities.   · No BLE edema  · Pedal Pulses: 2+ and equal   Abdomen:  · No masses or tenderness  · Liver/Spleen: No Abnormalities Noted  Neurological/Psychiatric:  · Alert and oriented in all spheres · Moves all extremities well  · Exhibits normal gait balance and coordination  · No abnormalities of mood, affect, memory, mentation, or behavior are noted    Lab Data:  Most recent lab results below reviewed in office    CBC:   Lab Results   Component Value Date    WBC 17.0 02/21/2018    WBC 14.2 02/19/2018    RBC 4.82 02/21/2018    RBC 5.48 02/19/2018    HGB 13.6 02/21/2018    HGB 15.5 02/19/2018    HCT 40.6 02/21/2018    HCT 46.2 02/19/2018    MCV 84.1 02/21/2018    MCV 84.3 02/19/2018    RDW 13.8 02/21/2018    RDW 13.4 02/19/2018     02/21/2018     02/19/2018     BMP:  Lab Results   Component Value Date     02/20/2020     12/02/2019     09/21/2018    K 4.8 02/20/2020    K 4.6 12/02/2019    K 4.1 09/21/2018    K 4.1 02/21/2018     02/20/2020     12/02/2019     09/21/2018    CO2 27 02/20/2020    CO2 25 12/02/2019    CO2 27 09/21/2018    PHOS 3.0 02/20/2020    PHOS 3.1 12/02/2019    BUN 28 02/20/2020    BUN 27 12/02/2019    BUN 23 09/21/2018    CREATININE 1.6 02/20/2020    CREATININE 1.3 12/02/2019    CREATININE 1.2 09/21/2018     BNP: No results found for: PROBNP  LIPID:   Lab Results   Component Value Date    TRIG 112 02/20/2020    TRIG 194 02/27/2018    HDL 41 02/20/2020    HDL 33 02/27/2018    LDLCALC 67 02/20/2020    LDLCALC 51 02/27/2018       Cardiac Imaging:    Assessment:    1. Essential hypertension    2. Mixed hyperlipidemia    3. Mesenteric vein thrombosis    4. Stage 3 chronic kidney disease ([address]          Plan:   1. No change in BP/ cholesterol medicines  2. Follow up with Dr. Zora Hills next month as planned  3. Follow up with Dr. Cherelle Seals in 6 months       I appreciate the opportunity of cooperating in the care of this individual.    Theda Schaumann, APRN - CNP, 3/9/2020, 10:49 AM       QUALITY MEASURES  1. Tobacco Cessation Counseling: NA  2. Retake of BP if >140/90:   NA  3.  Documentation to PCP/referring for new patient:  Sent to PCP at close

## 2020-03-11 ASSESSMENT — ENCOUNTER SYMPTOMS
SHORTNESS OF BREATH: 0
COUGH: 0
WHEEZING: 0

## 2020-04-08 ENCOUNTER — HOSPITAL ENCOUNTER (OUTPATIENT)
Age: 65
Discharge: HOME | End: 2020-04-08
Payer: MEDICARE

## 2020-04-08 LAB
ALBUMIN SERPL-MCNC: 4.7 G/DL (ref 3.4–5)
ANION GAP SERPL CALCULATED.3IONS-SCNC: 14 MMOL/L (ref 3–16)
BUN BLDV-MCNC: 23 MG/DL (ref 7–20)
CALCIUM SERPL-MCNC: 9.7 MG/DL (ref 8.3–10.6)
CHLORIDE BLD-SCNC: 102 MMOL/L (ref 99–110)
CO2: 25 MMOL/L (ref 21–32)
CREAT SERPL-MCNC: 1.6 MG/DL (ref 0.8–1.3)
GFR AFRICAN AMERICAN: 53
GFR NON-AFRICAN AMERICAN: 44
GLUCOSE BLD-MCNC: 112 MG/DL (ref 70–99)
PHOSPHORUS: 3.1 MG/DL (ref 2.5–4.9)
POTASSIUM SERPL-SCNC: 5.1 MMOL/L (ref 3.5–5.1)
SODIUM BLD-SCNC: 141 MMOL/L (ref 136–145)

## 2020-04-08 PROCEDURE — 80069 RENAL FUNCTION PANEL: CPT

## 2020-04-08 PROCEDURE — 36415 COLL VENOUS BLD VENIPUNCTURE: CPT

## 2020-07-08 ENCOUNTER — HOSPITAL ENCOUNTER (OUTPATIENT)
Age: 65
Discharge: HOME | End: 2020-07-08
Payer: MEDICARE

## 2020-07-08 LAB
ALBUMIN SERPL-MCNC: 4.5 G/DL (ref 3.4–5)
ANION GAP SERPL CALCULATED.3IONS-SCNC: 12 MMOL/L (ref 3–16)
BUN BLDV-MCNC: 24 MG/DL (ref 7–20)
CALCIUM SERPL-MCNC: 9.1 MG/DL (ref 8.3–10.6)
CHLORIDE BLD-SCNC: 100 MMOL/L (ref 99–110)
CO2: 26 MMOL/L (ref 21–32)
CREAT SERPL-MCNC: 1.4 MG/DL (ref 0.8–1.3)
GFR AFRICAN AMERICAN: >60
GFR NON-AFRICAN AMERICAN: 51
GLUCOSE BLD-MCNC: 124 MG/DL (ref 70–99)
PHOSPHORUS: 3.5 MG/DL (ref 2.5–4.9)
POTASSIUM SERPL-SCNC: 4.5 MMOL/L (ref 3.5–5.1)
SODIUM BLD-SCNC: 138 MMOL/L (ref 136–145)

## 2020-07-08 PROCEDURE — 80069 RENAL FUNCTION PANEL: CPT

## 2020-07-08 PROCEDURE — 36415 COLL VENOUS BLD VENIPUNCTURE: CPT

## 2020-08-11 ENCOUNTER — TELEPHONE (OUTPATIENT)
Dept: SURGERY | Age: 65
End: 2020-08-11

## 2020-08-11 ENCOUNTER — APPOINTMENT (OUTPATIENT)
Dept: ULTRASOUND IMAGING | Age: 65
End: 2020-08-11
Payer: MEDICARE

## 2020-08-11 ENCOUNTER — APPOINTMENT (OUTPATIENT)
Dept: CT IMAGING | Age: 65
End: 2020-08-11
Payer: MEDICARE

## 2020-08-11 ENCOUNTER — OFFICE VISIT (OUTPATIENT)
Dept: SURGERY | Age: 65
End: 2020-08-11
Payer: MEDICARE

## 2020-08-11 ENCOUNTER — HOSPITAL ENCOUNTER (EMERGENCY)
Age: 65
Discharge: HOME | End: 2020-08-11
Attending: EMERGENCY MEDICINE
Payer: MEDICARE

## 2020-08-11 VITALS
DIASTOLIC BLOOD PRESSURE: 81 MMHG | HEART RATE: 68 BPM | WEIGHT: 195 LBS | SYSTOLIC BLOOD PRESSURE: 165 MMHG | BODY MASS INDEX: 27.3 KG/M2 | HEIGHT: 71 IN | RESPIRATION RATE: 14 BRPM | OXYGEN SATURATION: 99 % | TEMPERATURE: 97.9 F

## 2020-08-11 VITALS
DIASTOLIC BLOOD PRESSURE: 83 MMHG | BODY MASS INDEX: 28.7 KG/M2 | HEART RATE: 59 BPM | TEMPERATURE: 97.8 F | SYSTOLIC BLOOD PRESSURE: 137 MMHG | HEIGHT: 71 IN | WEIGHT: 205 LBS

## 2020-08-11 PROBLEM — K80.20 SYMPTOMATIC CHOLELITHIASIS: Status: ACTIVE | Noted: 2020-08-11

## 2020-08-11 LAB
A/G RATIO: 1.6 (ref 1.1–2.2)
ALBUMIN SERPL-MCNC: 4.3 G/DL (ref 3.4–5)
ALP BLD-CCNC: 24 U/L (ref 40–129)
ALT SERPL-CCNC: 28 U/L (ref 10–40)
ANION GAP SERPL CALCULATED.3IONS-SCNC: 13 MMOL/L (ref 3–16)
AST SERPL-CCNC: 23 U/L (ref 15–37)
BASOPHILS ABSOLUTE: 0.1 K/UL (ref 0–0.2)
BASOPHILS RELATIVE PERCENT: 1 %
BILIRUB SERPL-MCNC: 0.5 MG/DL (ref 0–1)
BILIRUBIN URINE: NEGATIVE
BLOOD, URINE: NEGATIVE
BUN BLDV-MCNC: 26 MG/DL (ref 7–20)
CALCIUM SERPL-MCNC: 9.3 MG/DL (ref 8.3–10.6)
CHLORIDE BLD-SCNC: 101 MMOL/L (ref 99–110)
CLARITY: CLEAR
CO2: 22 MMOL/L (ref 21–32)
COLOR: YELLOW
CREAT SERPL-MCNC: 1.5 MG/DL (ref 0.8–1.3)
EOSINOPHILS ABSOLUTE: 0.1 K/UL (ref 0–0.6)
EOSINOPHILS RELATIVE PERCENT: 1.9 %
GFR AFRICAN AMERICAN: 57
GFR NON-AFRICAN AMERICAN: 47
GLOBULIN: 2.7 G/DL
GLUCOSE BLD-MCNC: 150 MG/DL (ref 70–99)
GLUCOSE URINE: NEGATIVE MG/DL
HCT VFR BLD CALC: 45.2 % (ref 40.5–52.5)
HEMOGLOBIN: 15 G/DL (ref 13.5–17.5)
KETONES, URINE: NEGATIVE MG/DL
LACTIC ACID: 1 MMOL/L (ref 0.4–2)
LEUKOCYTE ESTERASE, URINE: NEGATIVE
LIPASE: 33 U/L (ref 13–60)
LYMPHOCYTES ABSOLUTE: 1 K/UL (ref 1–5.1)
LYMPHOCYTES RELATIVE PERCENT: 12.3 %
MCH RBC QN AUTO: 29 PG (ref 26–34)
MCHC RBC AUTO-ENTMCNC: 33.2 G/DL (ref 31–36)
MCV RBC AUTO: 87.5 FL (ref 80–100)
MICROSCOPIC EXAMINATION: NORMAL
MONOCYTES ABSOLUTE: 0.5 K/UL (ref 0–1.3)
MONOCYTES RELATIVE PERCENT: 6.1 %
NEUTROPHILS ABSOLUTE: 6.1 K/UL (ref 1.7–7.7)
NEUTROPHILS RELATIVE PERCENT: 78.7 %
NITRITE, URINE: NEGATIVE
PDW BLD-RTO: 13.4 % (ref 12.4–15.4)
PH UA: 5.5 (ref 5–8)
PLATELET # BLD: 166 K/UL (ref 135–450)
PMV BLD AUTO: 7.8 FL (ref 5–10.5)
POTASSIUM REFLEX MAGNESIUM: 4.1 MMOL/L (ref 3.5–5.1)
PROTEIN UA: NEGATIVE MG/DL
RBC # BLD: 5.16 M/UL (ref 4.2–5.9)
SODIUM BLD-SCNC: 136 MMOL/L (ref 136–145)
SPECIFIC GRAVITY UA: 1.02 (ref 1–1.03)
TOTAL PROTEIN: 7 G/DL (ref 6.4–8.2)
URINE REFLEX TO CULTURE: NORMAL
URINE TYPE: NORMAL
UROBILINOGEN, URINE: 0.2 E.U./DL
WBC # BLD: 7.7 K/UL (ref 4–11)

## 2020-08-11 PROCEDURE — 6360000004 HC RX CONTRAST MEDICATION: Performed by: EMERGENCY MEDICINE

## 2020-08-11 PROCEDURE — 80053 COMPREHEN METABOLIC PANEL: CPT

## 2020-08-11 PROCEDURE — 2580000003 HC RX 258: Performed by: EMERGENCY MEDICINE

## 2020-08-11 PROCEDURE — 96372 THER/PROPH/DIAG INJ SC/IM: CPT

## 2020-08-11 PROCEDURE — 83605 ASSAY OF LACTIC ACID: CPT

## 2020-08-11 PROCEDURE — G8427 DOCREV CUR MEDS BY ELIG CLIN: HCPCS | Performed by: SURGERY

## 2020-08-11 PROCEDURE — 99284 EMERGENCY DEPT VISIT MOD MDM: CPT

## 2020-08-11 PROCEDURE — 1123F ACP DISCUSS/DSCN MKR DOCD: CPT | Performed by: SURGERY

## 2020-08-11 PROCEDURE — 83690 ASSAY OF LIPASE: CPT

## 2020-08-11 PROCEDURE — 6370000000 HC RX 637 (ALT 250 FOR IP): Performed by: EMERGENCY MEDICINE

## 2020-08-11 PROCEDURE — G8417 CALC BMI ABV UP PARAM F/U: HCPCS | Performed by: SURGERY

## 2020-08-11 PROCEDURE — 76705 ECHO EXAM OF ABDOMEN: CPT

## 2020-08-11 PROCEDURE — 6360000002 HC RX W HCPCS: Performed by: EMERGENCY MEDICINE

## 2020-08-11 PROCEDURE — 1036F TOBACCO NON-USER: CPT | Performed by: SURGERY

## 2020-08-11 PROCEDURE — 4040F PNEUMOC VAC/ADMIN/RCVD: CPT | Performed by: SURGERY

## 2020-08-11 PROCEDURE — 74177 CT ABD & PELVIS W/CONTRAST: CPT

## 2020-08-11 PROCEDURE — 3017F COLORECTAL CA SCREEN DOC REV: CPT | Performed by: SURGERY

## 2020-08-11 PROCEDURE — 85025 COMPLETE CBC W/AUTO DIFF WBC: CPT

## 2020-08-11 PROCEDURE — 81003 URINALYSIS AUTO W/O SCOPE: CPT

## 2020-08-11 PROCEDURE — 99203 OFFICE O/P NEW LOW 30 MIN: CPT | Performed by: SURGERY

## 2020-08-11 RX ORDER — SODIUM CHLORIDE 0.9 % (FLUSH) 0.9 %
10 SYRINGE (ML) INJECTION EVERY 12 HOURS SCHEDULED
Status: CANCELLED | OUTPATIENT
Start: 2020-08-11

## 2020-08-11 RX ORDER — ONDANSETRON 4 MG/1
4 TABLET, ORALLY DISINTEGRATING ORAL EVERY 8 HOURS PRN
Qty: 20 TABLET | Refills: 0 | Status: SHIPPED | OUTPATIENT
Start: 2020-08-11 | End: 2020-09-16

## 2020-08-11 RX ORDER — 0.9 % SODIUM CHLORIDE 0.9 %
1000 INTRAVENOUS SOLUTION INTRAVENOUS ONCE
Status: COMPLETED | OUTPATIENT
Start: 2020-08-11 | End: 2020-08-11

## 2020-08-11 RX ORDER — AMOXICILLIN AND CLAVULANATE POTASSIUM 875; 125 MG/1; MG/1
1 TABLET, FILM COATED ORAL 2 TIMES DAILY
Qty: 20 TABLET | Refills: 0 | Status: ON HOLD | OUTPATIENT
Start: 2020-08-11 | End: 2020-08-13 | Stop reason: HOSPADM

## 2020-08-11 RX ORDER — AMOXICILLIN AND CLAVULANATE POTASSIUM 875; 125 MG/1; MG/1
1 TABLET, FILM COATED ORAL ONCE
Status: COMPLETED | OUTPATIENT
Start: 2020-08-11 | End: 2020-08-11

## 2020-08-11 RX ORDER — SODIUM CHLORIDE 0.9 % (FLUSH) 0.9 %
10 SYRINGE (ML) INJECTION PRN
Status: CANCELLED | OUTPATIENT
Start: 2020-08-11

## 2020-08-11 RX ADMIN — IOPAMIDOL 75 ML: 755 INJECTION, SOLUTION INTRAVENOUS at 07:01

## 2020-08-11 RX ADMIN — SODIUM CHLORIDE 1000 ML: 9 INJECTION, SOLUTION INTRAVENOUS at 07:12

## 2020-08-11 RX ADMIN — AMOXICILLIN AND CLAVULANATE POTASSIUM 1 TABLET: 875; 125 TABLET, FILM COATED ORAL at 10:36

## 2020-08-11 RX ADMIN — ENOXAPARIN SODIUM 90 MG: 100 INJECTION SUBCUTANEOUS at 10:36

## 2020-08-11 RX ADMIN — SODIUM CHLORIDE 1000 ML: 9 INJECTION, SOLUTION INTRAVENOUS at 05:47

## 2020-08-11 ASSESSMENT — PAIN DESCRIPTION - DESCRIPTORS: DESCRIPTORS: CONSTANT

## 2020-08-11 ASSESSMENT — PAIN DESCRIPTION - LOCATION: LOCATION: ABDOMEN

## 2020-08-11 ASSESSMENT — PAIN DESCRIPTION - FREQUENCY: FREQUENCY: CONTINUOUS

## 2020-08-11 ASSESSMENT — PAIN - FUNCTIONAL ASSESSMENT: PAIN_FUNCTIONAL_ASSESSMENT: PREVENTS OR INTERFERES SOME ACTIVE ACTIVITIES AND ADLS

## 2020-08-11 ASSESSMENT — PAIN DESCRIPTION - PAIN TYPE: TYPE: ACUTE PAIN

## 2020-08-11 ASSESSMENT — PAIN SCALES - GENERAL: PAINLEVEL_OUTOF10: 7

## 2020-08-11 ASSESSMENT — PAIN DESCRIPTION - ONSET: ONSET: PROGRESSIVE

## 2020-08-11 ASSESSMENT — PAIN DESCRIPTION - PROGRESSION: CLINICAL_PROGRESSION: GRADUALLY WORSENING

## 2020-08-11 ASSESSMENT — PAIN SCALES - WONG BAKER: WONGBAKER_NUMERICALRESPONSE: 6

## 2020-08-11 NOTE — ED NOTES
abdomen soft and generally tender throughout. Pt denies vomiting or diarrhea.      Ying Ulloa RN  08/11/20 0563

## 2020-08-11 NOTE — ED NOTES
Pt discharged to home. IV removed, tip intact and pressure applied. Pt given discharge instructions and verbalized understanding. Pt ambulatory at discharge and left without incident.       Hernesto Somers RN  08/11/20 2251

## 2020-08-11 NOTE — TELEPHONE ENCOUNTER
Pt saw Dr Jomar Espino in the office 8/13/20 and was given surgery instructions for a Laparoscopic Cholecystectomy with Intraoperative Cholangiogram, Possible Open Procedure (General Anes) scheduled 8/13/20 @ 12:45pm arrival 10:45am [facility] - NPO after midnight - Pt instructed to get COVID tested 6-7 days prior to surgery - Dr Gerard Noguera to complete pre-op physical (H&P form given to pt @ OV)

## 2020-08-11 NOTE — ED NOTES

## 2020-08-11 NOTE — ED PROVIDER NOTES
Patient was signed out to me by Dr. Elvira Nieves at [phone] to follow up on CT results. Briefly, the patient is a 69-year-old male who in 2017 ended up having a mesenteric vein thrombosis requiring Xarelto and states that over the last 1.5 weeks he started having similar symptoms again. He has been off of Xarelto since October 2017 after he had repeat imaging that showed the blood clot resolved. He denies any chest pain or shortness of breath. No fever or headache. He states normally the pain seems to worsen after eating. No diarrhea. He is nauseated. By the time I saw him, he stated his pain was much better at 2 out of 10 and he was no longer nauseated. No other complaints at this time and he is just waiting on CT results. Physical:   Gen: No acute distress. AOx3. Psych: Normal mood and affect  HEENT: NCAT  Neck: supple  Cardiac: RRR, pulses 2+ in upper extremities  Lungs: C2AB, no R/R/W  Abdomen: soft and nontender with no R/D/G  Neuro: Moving all extremities equally, no focal deficits noted, GCS = 15    MDM: Patient was evaluated a concern for worsening pain with eating for the last 1.5 weeks similar to whenever he had a mesenteric vein thrombosis. CT was ordered by prior physician to further evaluate for this. He denied any chest pain or shortness of breath and story not suggestive of acute coronary syndrome or pulmonary embolism. CT did show concern for superior mesenteric venous thrombosis but also for possibility of acute cholecystitis. Ultrasound confirmed this diagnosis. I spoke to Dr. Steve Corona after speaking to hematology and he did offer to admit the patient for cholecystectomy in 2 days. The patient would prefer to go home at this point since he has to take his wife to a doctor's appointment this afternoon and otherwise was feeling much better. Dr. Steve Corona stated he would be happy to see the patient in his office this afternoon to schedule urgent cholecystectomy if needed.   At this time, I gave the patient a dose of Lovenox in case it is decided later this afternoon that he will have surgery in 2 days at the request of surgery. The patient is aware that he will need to get his anticoagulation ordered by general surgery upon evaluation in the office today since if he is planning on having the procedure, he will need another dose of Lovenox tomorrow but if he is planning on waiting to do the procedure, then he will need to be started on Xarelto. He knows to return to the emergency department sooner for any increasing abdominal pain, vomiting, fever, other concerning symptoms, but otherwise follow-up later this afternoon as discussed. He plans to call the office first thing upon leaving the ED to make an appointment. The patient was well-appearing and in no acute distress at time of discharge. He knows that if he changes his mind in regards to his abdominal pain, he is always welcome back to the ED since we did offer admission but he declined. He has full mental capacity to make his own medical decisions.     Impression: Superior mesenteric venous thrombosis, acute cholecystitis     Sánchez South MD  08/11/20 7244

## 2020-08-11 NOTE — ED NOTES
Ps hem/onc @ 0909  Re: superior mesenteric venous thrombosis    Per: Dr. Ximena العراقي NP returned call @ [address] [facility] [address]  08/11/20 [address]  08/11/20 2537

## 2020-08-11 NOTE — ED PROVIDER NOTES
CHIEF COMPLAINT  Abdominal Pain (says last time his stomach hurt like this he had a blood clot)      HISTORY OF PRESENT ILLNESS  Niall Ness is a 71 y.o. male presents to the ED with abdominal pain, generalized mid, slightly more left than right, he was concerned because in 2017 when he had similar pain he had a mesenteric vein thrombosis, and was hospitalized for 5 days, had been started on Xarelto, but once the clot had dissolved per CT scan repeated later that year, he was taken off the anticoagulation, not currently on any NSAIDs or aspirin, he has a history of CKD and follows with nephrology, creatinine typically runs 1.4-1.5 and has been stable for a long time, had some nausea and felt like he was going to vomit but had not yet, reports normal bowel movements, no melena or hematochezia. No dysuria/hematuria or difficulty with urination, had not take anything for pain or nausea prior to arrival, no other complaints, modifying factors or associated symptoms. I have reviewed the following from the nursing documentation.     Past Medical History:   Diagnosis Date    Hyperlipidemia     Hypertension     Thrombosis      Past Surgical History:   Procedure Laterality Date    ANKLE SURGERY      left    COLONOSCOPY  03/08/2017    SHOULDER SURGERY Right 2012    TONSILLECTOMY      WRIST SURGERY      right     Family History   Problem Relation Age of Onset    Breast Cancer Mother     Coronary Art Dis Father     Diabetes Sister      Social History     Socioeconomic History    Marital status:      Spouse name: Not on file    Number of children: Not on file    Years of education: Not on file    Highest education level: Not on file   Occupational History    Not on file   Social Needs    Financial resource strain: Not on file    Food insecurity     Worry: Not on file     Inability: Not on file    Transportation needs     Medical: Not on file     Non-medical: Not on file   Tobacco Use    Smoking status: Never Smoker    Smokeless tobacco: Never Used   Substance and Sexual Activity    Alcohol use: Yes     Comment: 1-2 drinks per year    Drug use: No    Sexual activity: Yes   Lifestyle    Physical activity     Days per week: Not on file     Minutes per session: Not on file    Stress: Not on file   Relationships    Social connections     Talks on phone: Not on file     Gets together: Not on file     Attends [employer] service: Not on file     Active member of club or organization: Not on file     Attends meetings of clubs or organizations: Not on file     Relationship status: Not on file    Intimate partner violence     Fear of current or ex partner: Not on file     Emotionally abused: Not on file     Physically abused: Not on file     Forced sexual activity: Not on file   Other Topics Concern    Not on file   Social History Narrative    Not on file     Current Facility-Administered Medications   Medication Dose Route Frequency Provider Last Rate Last Dose    0.9 % sodium chloride bolus  1,000 mL Intravenous Once Kizzy Rodgers, DO 2,000 mL/hr at 08/11/20 0712 1,000 mL at 08/11/20 0712     Current Outpatient Medications   Medication Sig Dispense Refill    aspirin 81 MG tablet Take 81 mg by mouth daily      fenofibrate (TRICOR) 145 MG tablet Take 1 tablet by mouth daily TAKE 1 TABLET BY MOUTH ONCE DAILY 90 tablet 3    simvastatin (ZOCOR) 20 MG tablet Take 1 tablet by mouth nightly 90 tablet 3    amLODIPine (NORVASC) 2.5 MG tablet Take 1 tablet by mouth daily 90 tablet 3    lisinopril (PRINIVIL;ZESTRIL) 5 MG tablet Take 1 tablet by mouth daily 90 tablet 3     No Known Allergies    REVIEW OF SYSTEMS  10 systems reviewed, pertinent positives per HPI otherwise noted to be negative. PHYSICAL EXAM  BP (!) 160/93   Pulse 72   Temp 97.9 °F (36.6 °C) (Oral)   Resp 16   SpO2 99%   GENERAL APPEARANCE: Awake and alert. Cooperative. No acute distress  HEAD: Normocephalic. Atraumatic. EYES: PERRL. EOM's grossly intact. ENT: Mucous membranes are moist.   NECK: Supple. No rigidity  HEART: RRR. No murmurs  LUNGS: Respirations nonlabored. Lungs are clear to auscultation bilaterally. ABDOMEN: Soft. Non-distended. Mild generalized tenderness to palpation, worse periumbilically, no guarding or rebound. Hyperactive bowel sounds. Rotund abdomen  EXTREMITIES: No peripheral edema. No calf tenderness, moves all extremities equally. All extremities neurovascularly intact. SKIN: Warm and dry. No acute rashes. NEUROLOGICAL: Alert and oriented. No gross facial drooping. Strength 5/5, sensation intact. No truncal ataxia. Normal speech  PSYCHIATRIC: Normal mood and affect. LABS  I have reviewed all labs for this visit.    Results for orders placed or performed during the hospital encounter of 08/11/20   CBC Auto Differential   Result Value Ref Range    WBC 7.7 4.0 - 11.0 K/uL    RBC 5.16 4.20 - 5.90 M/uL    Hemoglobin 15.0 13.5 - 17.5 g/dL    Hematocrit 45.2 40.5 - 52.5 %    MCV 87.5 80.0 - 100.0 fL    MCH 29.0 26.0 - 34.0 pg    MCHC 33.2 31.0 - 36.0 g/dL    RDW 13.4 12.4 - 15.4 %    Platelets 869 443 - 156 K/uL    MPV 7.8 5.0 - 10.5 fL    Neutrophils % 78.7 %    Lymphocytes % 12.3 %    Monocytes % 6.1 %    Eosinophils % 1.9 %    Basophils % 1.0 %    Neutrophils Absolute 6.1 1.7 - 7.7 K/uL    Lymphocytes Absolute 1.0 1.0 - 5.1 K/uL    Monocytes Absolute 0.5 0.0 - 1.3 K/uL    Eosinophils Absolute 0.1 0.0 - 0.6 K/uL    Basophils Absolute 0.1 0.0 - 0.2 K/uL   Comprehensive Metabolic Panel w/ Reflex to MG   Result Value Ref Range    Sodium 136 136 - 145 mmol/L    Potassium reflex Magnesium 4.1 3.5 - 5.1 mmol/L    Chloride 101 99 - 110 mmol/L    CO2 22 21 - 32 mmol/L    Anion Gap 13 3 - 16    Glucose 150 (H) 70 - 99 mg/dL    BUN 26 (H) 7 - 20 mg/dL    CREATININE 1.5 (H) 0.8 - 1.3 mg/dL    GFR Non- 47 (A) >60    GFR  57 (A) >60    Calcium 9.3 8.3 - 10.6 mg/dL    Total Protein 7.0 6.4 - 8.2 g/dL    Alb 4.3 3.4 - 5.0 g/dL    Albumin/Globulin Ratio 1.6 1.1 - 2.2    Total Bilirubin 0.5 0.0 - 1.0 mg/dL    Alkaline Phosphatase 24 (L) 40 - 129 U/L    ALT 28 10 - 40 U/L    AST 23 15 - 37 U/L    Globulin 2.7 g/dL   Lactic Acid, Plasma   Result Value Ref Range    Lactic Acid 1.0 0.4 - 2.0 mmol/L   Lipase   Result Value Ref Range    Lipase 33.0 13.0 - 60.0 U/L         RADIOLOGY  Pending CT    ED COURSE/MDM  Patient seen and evaluated. Old records reviewed. 59-year-old male with abdominal pain, not currently anticoagulated, he was concerned about another abdominal venous thrombus, and though he declined anything for pain or nausea here as it had improved a little, given 2 L of fluids due to his CKD, and he and radiology were agreeable to contrast study given his current renal function, patient signed out to Dr. Shawn Vee at [facility] for follow-up on the CT scan. Orders Placed This Encounter   Procedures    CT ABDOMEN PELVIS W IV CONTRAST Additional Contrast? None    CBC Auto Differential    Comprehensive Metabolic Panel w/ Reflex to MG    Lactic Acid, Plasma    Lipase    Urinalysis Reflex to Culture     Orders Placed This Encounter   Medications    0.9 % sodium chloride bolus    iopamidol (ISOVUE-370) 76 % injection 75 mL    0.9 % sodium chloride bolus          CLINICAL IMPRESSION  1. Generalized abdominal pain    2. Nausea    3. Chronic kidney disease, unspecified CKD stage    4. Essential hypertension        Blood pressure (!) 160/93, pulse 72, temperature 97.9 °F (36.6 °C), temperature source Oral, resp. rate 16, SpO2 99 %. DISPOSITION  Roslyn Lopes was transitioned care to Dr. Shawn Vee at [facility] in stable condition.                    Mely Cid, DO  08/11/20 2227

## 2020-08-12 ENCOUNTER — OFFICE VISIT (OUTPATIENT)
Dept: FAMILY MEDICINE CLINIC | Age: 65
End: 2020-08-12
Payer: MEDICARE

## 2020-08-12 ENCOUNTER — ANESTHESIA EVENT (OUTPATIENT)
Dept: OPERATING ROOM | Age: 65
End: 2020-08-12
Payer: MEDICARE

## 2020-08-12 ENCOUNTER — TELEPHONE (OUTPATIENT)
Dept: CARDIOLOGY CLINIC | Age: 65
End: 2020-08-12

## 2020-08-12 VITALS
HEART RATE: 90 BPM | BODY MASS INDEX: 28.39 KG/M2 | TEMPERATURE: 98.2 F | OXYGEN SATURATION: 98 % | DIASTOLIC BLOOD PRESSURE: 68 MMHG | HEIGHT: 71 IN | SYSTOLIC BLOOD PRESSURE: 136 MMHG | RESPIRATION RATE: 16 BRPM | WEIGHT: 202.8 LBS

## 2020-08-12 PROCEDURE — G8427 DOCREV CUR MEDS BY ELIG CLIN: HCPCS | Performed by: PHYSICIAN ASSISTANT

## 2020-08-12 PROCEDURE — 1036F TOBACCO NON-USER: CPT | Performed by: PHYSICIAN ASSISTANT

## 2020-08-12 PROCEDURE — 3017F COLORECTAL CA SCREEN DOC REV: CPT | Performed by: PHYSICIAN ASSISTANT

## 2020-08-12 PROCEDURE — 1123F ACP DISCUSS/DSCN MKR DOCD: CPT | Performed by: PHYSICIAN ASSISTANT

## 2020-08-12 PROCEDURE — G8417 CALC BMI ABV UP PARAM F/U: HCPCS | Performed by: PHYSICIAN ASSISTANT

## 2020-08-12 PROCEDURE — 93000 ELECTROCARDIOGRAM COMPLETE: CPT | Performed by: PHYSICIAN ASSISTANT

## 2020-08-12 PROCEDURE — 99213 OFFICE O/P EST LOW 20 MIN: CPT | Performed by: PHYSICIAN ASSISTANT

## 2020-08-12 PROCEDURE — 4040F PNEUMOC VAC/ADMIN/RCVD: CPT | Performed by: PHYSICIAN ASSISTANT

## 2020-08-12 ASSESSMENT — PATIENT HEALTH QUESTIONNAIRE - PHQ9
SUM OF ALL RESPONSES TO PHQ9 QUESTIONS 1 & 2: 0
2. FEELING DOWN, DEPRESSED OR HOPELESS: 0
1. LITTLE INTEREST OR PLEASURE IN DOING THINGS: 0
SUM OF ALL RESPONSES TO PHQ QUESTIONS 1-9: 0
SUM OF ALL RESPONSES TO PHQ QUESTIONS 1-9: 0

## 2020-08-12 NOTE — PATIENT INSTRUCTIONS
[address] [address]  Phone: [phone]  Fax: [phone] will be scheduled for surgery with Dr. Susana Berger. · The office will call you with the date and time that surgery is scheduled. · Please take note of these instructions for surgery:  · You should have nothing by mouth after midnight the night before your surgery - this includes no food or water. · Your surgery will be cancelled if you have taken anything by mouth after midnight, NO exceptions. · You will need to have a history and physical prior to your surgery. This will need to be completed up to 30 days before your surgery. This H/P can be completed by your family doctor or the hospital.   · IF you take coumadin (warfarin), please stop taking this medication 5 days prior to your surgery. · IF you take plavix, please stop taking this 7 days prior to your surgery. · Please contact our office if you have a pacemaker or defibrillator. · IF you are allergic to latex, please tell our office prior to your surgery. This is important in know before scheduling your surgery. · IF you are having an out patient surgery, you will need someone available to drive you home after your surgery, and to also stay with you for the rest of the day. · IF you are having a surgery requiring an inpatient stay in the hospital, you will need someone to drive you home upon discharge from the hospital.  · Please contact Dr. Vásquez [facility] assistant Aurelio Sprague if you have any questions or concerns. · Please call the office with any changes in your symptoms or further questions/concerns.  [phone]

## 2020-08-12 NOTE — TELEPHONE ENCOUNTER
Pt stopped by, is having gall bladder surgery tomorrow with Dr Alison Burns at [facility] at 12:30. Would like cardiology to see the EKG that was done with his primary care. Notes can be put in epic for surgery from cardiology. Copy of EKG was brought in and gave to [facility].

## 2020-08-12 NOTE — PROGRESS NOTES
sea    New Patient [address] [facility] [place] MANISHA Henderson, [address] [facility], [address] [facility], [address] [facility]  Phone: [phone]  Fax: [address]   YOB: 1955    Date of Visit:  8/11/2020    No ref. provider found  Vega Lopez DOLORES,     HPI:     Gallbladder: Patient is 71y.o. year old male seen at request of [address], DO. Patient presents for evaluation of gallbladder problems. Patient has prior h/o SMV thrombosis several years ago requiring anticoagulation. For past week or so pt has noted recurrent epigastric pain similar to prior SMV occlusion. In addition notes occasional RUQ pains. Seen in ED today, noted on CT a recurrent SMV non-occlusive thrombosis. In addition, noted mild gallbladder inflammation w/ stones. Symptoms improved with IVF and IV abx administration. In discussion with Hematology, pt will need to resume long-term anticoagulation.       No Known Allergies  Outpatient Medications Marked as Taking for the 8/11/20 encounter (Office Visit) with Belem Lucero MD   Medication Sig Dispense Refill    amoxicillin-clavulanate (AUGMENTIN) 875-125 MG per tablet Take 1 tablet by mouth 2 times daily for 10 days 20 tablet 0    ondansetron (ZOFRAN ODT) 4 MG disintegrating tablet Take 1 tablet by mouth every 8 hours as needed for Nausea 20 tablet 0    aspirin 81 MG tablet Take 81 mg by mouth daily      fenofibrate (TRICOR) 145 MG tablet Take 1 tablet by mouth daily TAKE 1 TABLET BY MOUTH ONCE DAILY 90 tablet 3    simvastatin (ZOCOR) 20 MG tablet Take 1 tablet by mouth nightly 90 tablet 3    amLODIPine (NORVASC) 2.5 MG tablet Take 1 tablet by mouth daily 90 tablet 3    lisinopril (PRINIVIL;ZESTRIL) 5 MG tablet Take 1 tablet by mouth daily 90 tablet 3       Past Medical History:   Diagnosis Date    Hyperlipidemia     Hypertension     Symptomatic cholelithiasis     Thrombosis      Past Surgical History:   Procedure Laterality Date    ANKLE SURGERY      left    COLONOSCOPY  03/08/2017    SHOULDER SURGERY Right 2012    TONSILLECTOMY      WRIST SURGERY      right     Family History   Problem Relation Age of Onset    Breast Cancer Mother     Coronary Art Dis Father     Diabetes Sister      Social History     Socioeconomic History    Marital status:      Spouse name: Not on file    Number of children: Not on file    Years of education: Not on file    Highest education level: Not on file   Occupational History    Not on file   Social Needs    Financial resource strain: Not on file    Food insecurity     Worry: Not on file     Inability: Not on file    Transportation needs     Medical: Not on file     Non-medical: Not on file   Tobacco Use    Smoking status: Never Smoker    Smokeless tobacco: Never Used   Substance and Sexual Activity    Alcohol use: Yes     Comment: 1-2 drinks per year    Drug use: No    Sexual activity: Yes   Lifestyle    Physical activity     Days per week: Not on file     Minutes per session: Not on file    Stress: Not on file   Relationships    Social connections     Talks on phone: Not on file     Gets together: Not on file     Attends Nondenominational service: Not on file     Active member of club or organization: Not on file     Attends meetings of clubs or organizations: Not on file     Relationship status: Not on file    Intimate partner violence     Fear of current or ex partner: Not on file     Emotionally abused: Not on file     Physically abused: Not on file     Forced sexual activity: Not on file   Other Topics Concern    Not on file   Social History Narrative    Not on file          Vitals:    08/11/20 1434   BP: 137/83   Site: Right Wrist   Position: Sitting   Cuff Size: Medium Adult   Pulse: 59   Temp: 97.8 °F (36.6 °C)   Weight: 205 lb (93 kg)   Height: 5' 10.98\" (1.803 m)     Body mass index is 28.6 kg/m².      Wt Readings from Last 3 Encounters: 08/11/20 205 lb (93 kg)   08/11/20 195 lb (88.5 kg)   07/13/20 204 lb 9.6 oz (92.8 kg)     BP Readings from Last 3 Encounters:   08/11/20 137/83   08/11/20 (!) 165/81   07/13/20 139/80          REVIEW OF SYSTEMS:   · All other systems reviewed; please refer to HPI with pertinent positives, all other ROS are negative    PHYSICAL EXAM:    CONSTITUTIONAL:  awake, alert, no apparent distress and normal weight  ENT:  normocepalic, without obvious abnormality  NECK:  supple, symmetrical, trachea midline   LUNGS:  Resp easy and unlabored  CARDIOVASCULAR:    ABDOMEN:  no scars, soft, non-distended, tenderness noted in the epigastric region, involuntary guarding absent,   MUSCULOSKELETAL: No edema  NEUROLOGIC:  Mental Status Exam:  Level of Alertness:   awake  Orientation:   person, place, time        DATA:  CBC:   Lab Results   Component Value Date    WBC 7.7 08/11/2020    RBC 5.16 08/11/2020    HGB 15.0 08/11/2020    HCT 45.2 08/11/2020    MCV 87.5 08/11/2020    MCH 29.0 08/11/2020    MCHC 33.2 08/11/2020    RDW 13.4 08/11/2020     08/11/2020    MPV 7.8 08/11/2020     Hepatic Function Panel:    Lab Results   Component Value Date    ALKPHOS 24 08/11/2020    ALT 28 08/11/2020    AST 23 08/11/2020    PROT 7.0 08/11/2020    BILITOT 0.5 08/11/2020    BILIDIR <0.2 02/20/2020    IBILI see below 02/20/2020    LABALBU 4.3 08/11/2020     Radiology Review:  CT abd/pelvis  FINDINGS:    Lower Chest: There is bibasilar atelectasis.  Coronary artery calcifications    are a marker of atherosclerosis.         Organs: The liver, spleen, pancreas, adrenal glands and kidneys are    unremarkable.         A cholelithiasis present in the neck of the gallbladder.  There is mild    pericholecystic inflammation consistent with acute cholecystitis.         GI/Bowel: There is no bowel obstruction.  The appendix is within normal    limits.         Pelvis:  The pelvic viscera are within normal limits.  There is a small to    moderate fat containing umbilical hernia.         Peritoneum/Retroperitoneum: There is no evidence of free fluid or adenopathy.         However, there is a new subocclusive thrombus within the superior mesenteric    vein.         Bones/Soft Tissues: Degenerative changes involve the thoracolumbar bilateral    hips.              Impression    1. Acute cholecystitis. 2. New subocclusive superior mesenteric vein thrombus.                RIGHT UPPER QUADRANT ULTRASOUND         8/11/2020 7:48 am         COMPARISON:    08/11/2020         HISTORY:    ORDERING SYSTEM PROVIDED HISTORY: upper abdominal pain    TECHNOLOGIST PROVIDED HISTORY:    Reason for exam:->upper abdominal pain         FINDINGS:    LIVER:  The liver is normal in size with increased echotexture consistent    with hepatic steatosis.  There is no ductal dilatation or mass.         BILIARY SYSTEM: Cholelithiasis present in lumen of the gallbladder.  No    change in the mild pericholecystic fluid the gallbladder wall is within    normal limits.  The common bile duct measures 7 mm.         RIGHT KIDNEY: The right kidney is unremarkable measuring 11.4 cm.  There is    no renal mass or hydronephrosis.         PANCREAS:  Visualized portions of the pancreas are unremarkable.         OTHER: No evidence of right upper quadrant ascites.              Impression    1. Unchanged acute cholecystitis. 2. Hepatic steatosis. ASSESSMENT:     Diagnosis Orders   1. Symptomatic cholelithiasis     2. Mesenteric vein thrombosis       I agree it may be advisable to proceed with elective cholecystectomy prior to his beginning anticoagulation to avoid having to have surgery while anti-coagulated. PLAN:    We will schedule the pt for a  laparoscopic cholecystectomy with intraoperative cholangiogram The technical aspects, risks, benefits and complications of the procedure were discussed with the patient.  The pt appears to understand, asks appropriate questions, and agrees to proceed with the procedure.        [facility] THOMAS

## 2020-08-12 NOTE — TELEPHONE ENCOUNTER
ECG reviewed from today: NSR 63 bpm, normal ECG. Patient Active Problem List   Diagnosis    Mesenteric vein thrombosis    Essential hypertension    Mixed hyperlipidemia    Chronic abdominal pain    Stage 3 chronic kidney disease (HCC)    Symptomatic cholelithiasis       Per Revised Cardiac Risk Index Lajuanda Loose Criteria):   Estimated Risk of Adverse Outcome with Non-cardiac Surgery: LOW  Estimated Rate of Myocardial Infarction, Pulmonary Edema, Ventricular Fibrillation, Cardiac Arrest, or Complete Heart Block:  0.9%      No cardiac contra-indication to undergo proposed surgery.    Rohith Hernandez, APRN - CNP

## 2020-08-12 NOTE — PROGRESS NOTES
[facility]  [address] [place] [address]   O: [phone]   F: [phone]    PRE- OPERATIVE HISTORY AND PHYSICAL    Today's Date: 08/12/20    Chief Complaint   Patient presents with    Pre-op Exam     gallbladder surgery on 8/13/20 phone [phone] fax [phone] Dr. Demetris Ng:       Armandina Scheuermann 1955 is a 71 y.o. male who presents for pre-operative evaluation for: found to have a symptomatic cholelithiasis with inflammation and started Augmentin. Also found to have another mesenteric thrombosis. Found initially  2/2017. Took xarelto x 6 mo and repeat CT was negative in Nov 2017 so xarelto was discontinued. He has appointment with Dr Constantin STEPHENS on 8/19/2020. Procedure for cholecystectomy prior to restart of anticoagulant. Had a Lovenox injections yesterday. PROCEDURE:  LAPAROSCOPIC CHOLECYSTECTOMY WITH INTRAOPERATIVE CHOLANGIOGRAM, POSSIBLE OPEN PROCEDURE    INDICATION FOR PROCEDURE:  Symptomatic cholelithiasis    DATE OF PROCEDURE: 8/13/2020    PHYSICIAN performing PROCEDURE:   Dr. Delia Murrieta anesthesia is:  general  History of adverse or allergic reaction to anesthesia? No    Dentures: none  Bleeding risk?:   no recent or remote history of abnormal bleeding. Previous transfusion/Complications: no      REVIEW OF SYSTEMS:    CONSTITUTIONAL:  negative  EYES:  negative  HEENT:  negative  RESPIRATORY:  negative  CARDIOVASCULAR:  negative  GASTROINTESTINAL:  negative  GENITOURINARY:  negative  INTEGUMENT/BREAST:  negative  HEMATOLOGIC/LYMPHATIC:  Superior mesenteric thrombosis finding.    ALLERGIC/IMMUNOLOGIC:  negative  ENDOCRINE:  negative  MUSCULOSKELETAL:  Negative    NEUROLOGICAL:  negative    PAST MEDICAL HISTORY:  Past Medical History:   Diagnosis Date    Hx of blood clots     Hyperlipidemia     Hypertension     PONV (postoperative nausea and vomiting)     Symptomatic cholelithiasis     Thrombosis        PROBLEM LIST: Patient Active Problem List   Diagnosis    Mesenteric vein thrombosis    Essential hypertension    Mixed hyperlipidemia    Chronic abdominal pain    Stage 3 chronic kidney disease (HCC)    Symptomatic cholelithiasis       PAST SURGICAL HISTORY:  Past Surgical History:   Procedure Laterality Date    ANKLE SURGERY      left    COLONOSCOPY  03/08/2017    SHOULDER SURGERY Right 2012    TONSILLECTOMY      WRIST SURGERY      right       Social History:   Tobacco use:   Social History     Tobacco Use   Smoking Status Never Smoker   Smokeless Tobacco Never Used     The patient states he has 0 alcohol drinks per week. Family History:  Family History   Problem Relation Age of Onset    Breast Cancer Mother     Coronary Art Dis Father     Diabetes Sister        MEDICATIONS:  Current Outpatient Medications   Medication Sig Dispense Refill    amoxicillin-clavulanate (AUGMENTIN) 875-125 MG per tablet Take 1 tablet by mouth 2 times daily for 10 days 20 tablet 0    ondansetron (ZOFRAN ODT) 4 MG disintegrating tablet Take 1 tablet by mouth every 8 hours as needed for Nausea 20 tablet 0    fenofibrate (TRICOR) 145 MG tablet Take 1 tablet by mouth daily TAKE 1 TABLET BY MOUTH ONCE DAILY 90 tablet 3    simvastatin (ZOCOR) 20 MG tablet Take 1 tablet by mouth nightly 90 tablet 3    amLODIPine (NORVASC) 2.5 MG tablet Take 1 tablet by mouth daily 90 tablet 3    lisinopril (PRINIVIL;ZESTRIL) 5 MG tablet Take 1 tablet by mouth daily 90 tablet 3    aspirin 81 MG tablet Take 81 mg by mouth daily       No current facility-administered medications for this visit. ALLERGIES:    No Known Allergies    PHYSICAL EXAM:    Vitals:    08/12/20 1318   BP: 136/68   Site: Left Upper Arm   Position: Sitting   Cuff Size: Medium Adult   Pulse: 90   Resp: 16   Temp: 98.2 °F (36.8 °C)   TempSrc: Temporal   SpO2: 98%   Weight: 202 lb 12.8 oz (92 kg)   Height: 5' 10.98\" (1.803 m)   Body mass index is 28.3 kg/m².     Constitutional:

## 2020-08-13 ENCOUNTER — HOSPITAL ENCOUNTER (OUTPATIENT)
Age: 65
Setting detail: OUTPATIENT SURGERY
Discharge: HOME | End: 2020-08-13
Attending: SURGERY | Admitting: SURGERY
Payer: MEDICARE

## 2020-08-13 ENCOUNTER — ANESTHESIA (OUTPATIENT)
Dept: OPERATING ROOM | Age: 65
End: 2020-08-13
Payer: MEDICARE

## 2020-08-13 ENCOUNTER — HOSPITAL ENCOUNTER (OUTPATIENT)
Dept: GENERAL RADIOLOGY | Age: 65
Discharge: HOME | End: 2020-08-13
Attending: SURGERY
Payer: MEDICARE

## 2020-08-13 VITALS — TEMPERATURE: 97.2 F | OXYGEN SATURATION: 97 % | SYSTOLIC BLOOD PRESSURE: 133 MMHG | DIASTOLIC BLOOD PRESSURE: 88 MMHG

## 2020-08-13 VITALS
WEIGHT: 199.9 LBS | RESPIRATION RATE: 17 BRPM | HEIGHT: 71 IN | TEMPERATURE: 98.1 F | DIASTOLIC BLOOD PRESSURE: 65 MMHG | HEART RATE: 67 BPM | BODY MASS INDEX: 27.98 KG/M2 | OXYGEN SATURATION: 95 % | SYSTOLIC BLOOD PRESSURE: 156 MMHG

## 2020-08-13 LAB
ANION GAP SERPL CALCULATED.3IONS-SCNC: 12 MMOL/L (ref 3–16)
BUN BLDV-MCNC: 22 MG/DL (ref 7–20)
CALCIUM SERPL-MCNC: 9.7 MG/DL (ref 8.3–10.6)
CHLORIDE BLD-SCNC: 105 MMOL/L (ref 99–110)
CO2: 25 MMOL/L (ref 21–32)
CREAT SERPL-MCNC: 1.4 MG/DL (ref 0.8–1.3)
GFR AFRICAN AMERICAN: >60
GFR NON-AFRICAN AMERICAN: 51
GLUCOSE BLD-MCNC: 120 MG/DL (ref 70–99)
POTASSIUM REFLEX MAGNESIUM: 4.7 MMOL/L (ref 3.5–5.1)
SODIUM BLD-SCNC: 142 MMOL/L (ref 136–145)

## 2020-08-13 PROCEDURE — 6360000002 HC RX W HCPCS: Performed by: ANESTHESIOLOGY

## 2020-08-13 PROCEDURE — 2500000003 HC RX 250 WO HCPCS: Performed by: SURGERY

## 2020-08-13 PROCEDURE — 7100000001 HC PACU RECOVERY - ADDTL 15 MIN: Performed by: SURGERY

## 2020-08-13 PROCEDURE — 88304 TISSUE EXAM BY PATHOLOGIST: CPT

## 2020-08-13 PROCEDURE — 2580000003 HC RX 258: Performed by: SURGERY

## 2020-08-13 PROCEDURE — 3700000000 HC ANESTHESIA ATTENDED CARE: Performed by: SURGERY

## 2020-08-13 PROCEDURE — 2500000003 HC RX 250 WO HCPCS: Performed by: ANESTHESIOLOGY

## 2020-08-13 PROCEDURE — 7100000011 HC PHASE II RECOVERY - ADDTL 15 MIN: Performed by: SURGERY

## 2020-08-13 PROCEDURE — 80048 BASIC METABOLIC PNL TOTAL CA: CPT

## 2020-08-13 PROCEDURE — 6360000002 HC RX W HCPCS: Performed by: NURSE ANESTHETIST, CERTIFIED REGISTERED

## 2020-08-13 PROCEDURE — 3209999900 FLUORO FOR SURGICAL PROCEDURES

## 2020-08-13 PROCEDURE — 3700000001 HC ADD 15 MINUTES (ANESTHESIA): Performed by: SURGERY

## 2020-08-13 PROCEDURE — 3600000005 HC SURGERY LEVEL 5 BASE: Performed by: SURGERY

## 2020-08-13 PROCEDURE — 74300 X-RAY BILE DUCTS/PANCREAS: CPT

## 2020-08-13 PROCEDURE — 49585 REPAIR UMBILICAL HERN,5+Y/O,REDUC: CPT | Performed by: SURGERY

## 2020-08-13 PROCEDURE — 7100000000 HC PACU RECOVERY - FIRST 15 MIN: Performed by: SURGERY

## 2020-08-13 PROCEDURE — 7100000010 HC PHASE II RECOVERY - FIRST 15 MIN: Performed by: SURGERY

## 2020-08-13 PROCEDURE — 3600000015 HC SURGERY LEVEL 5 ADDTL 15MIN: Performed by: SURGERY

## 2020-08-13 PROCEDURE — 47563 LAPARO CHOLECYSTECTOMY/GRAPH: CPT | Performed by: SURGERY

## 2020-08-13 PROCEDURE — 2500000003 HC RX 250 WO HCPCS: Performed by: NURSE ANESTHETIST, CERTIFIED REGISTERED

## 2020-08-13 PROCEDURE — 6370000000 HC RX 637 (ALT 250 FOR IP): Performed by: ANESTHESIOLOGY

## 2020-08-13 PROCEDURE — 2720000010 HC SURG SUPPLY STERILE: Performed by: SURGERY

## 2020-08-13 PROCEDURE — 6360000004 HC RX CONTRAST MEDICATION: Performed by: SURGERY

## 2020-08-13 PROCEDURE — 2709999900 HC NON-CHARGEABLE SUPPLY: Performed by: SURGERY

## 2020-08-13 PROCEDURE — 2580000003 HC RX 258: Performed by: ANESTHESIOLOGY

## 2020-08-13 RX ORDER — OXYCODONE HYDROCHLORIDE AND ACETAMINOPHEN 5; 325 MG/1; MG/1
1 TABLET ORAL PRN
Status: COMPLETED | OUTPATIENT
Start: 2020-08-13 | End: 2020-08-13

## 2020-08-13 RX ORDER — DEXAMETHASONE SODIUM PHOSPHATE 4 MG/ML
INJECTION, SOLUTION INTRA-ARTICULAR; INTRALESIONAL; INTRAMUSCULAR; INTRAVENOUS; SOFT TISSUE PRN
Status: DISCONTINUED | OUTPATIENT
Start: 2020-08-13 | End: 2020-08-13 | Stop reason: SDUPTHER

## 2020-08-13 RX ORDER — HYDRALAZINE HYDROCHLORIDE 20 MG/ML
5 INJECTION INTRAMUSCULAR; INTRAVENOUS EVERY 30 MIN PRN
Status: DISCONTINUED | OUTPATIENT
Start: 2020-08-13 | End: 2020-08-13 | Stop reason: HOSPADM

## 2020-08-13 RX ORDER — MIDAZOLAM HYDROCHLORIDE 1 MG/ML
INJECTION INTRAMUSCULAR; INTRAVENOUS PRN
Status: DISCONTINUED | OUTPATIENT
Start: 2020-08-13 | End: 2020-08-13 | Stop reason: SDUPTHER

## 2020-08-13 RX ORDER — PROPOFOL 10 MG/ML
INJECTION, EMULSION INTRAVENOUS PRN
Status: DISCONTINUED | OUTPATIENT
Start: 2020-08-13 | End: 2020-08-13 | Stop reason: SDUPTHER

## 2020-08-13 RX ORDER — SODIUM CHLORIDE 0.9 % (FLUSH) 0.9 %
10 SYRINGE (ML) INJECTION PRN
Status: DISCONTINUED | OUTPATIENT
Start: 2020-08-13 | End: 2020-08-13 | Stop reason: HOSPADM

## 2020-08-13 RX ORDER — LIDOCAINE HYDROCHLORIDE 20 MG/ML
INJECTION, SOLUTION INFILTRATION; PERINEURAL PRN
Status: DISCONTINUED | OUTPATIENT
Start: 2020-08-13 | End: 2020-08-13 | Stop reason: SDUPTHER

## 2020-08-13 RX ORDER — LABETALOL HYDROCHLORIDE 5 MG/ML
5 INJECTION, SOLUTION INTRAVENOUS
Status: DISCONTINUED | OUTPATIENT
Start: 2020-08-13 | End: 2020-08-13 | Stop reason: HOSPADM

## 2020-08-13 RX ORDER — BUPIVACAINE HYDROCHLORIDE 5 MG/ML
INJECTION, SOLUTION EPIDURAL; INTRACAUDAL PRN
Status: DISCONTINUED | OUTPATIENT
Start: 2020-08-13 | End: 2020-08-13 | Stop reason: ALTCHOICE

## 2020-08-13 RX ORDER — ONDANSETRON 2 MG/ML
INJECTION INTRAMUSCULAR; INTRAVENOUS PRN
Status: DISCONTINUED | OUTPATIENT
Start: 2020-08-13 | End: 2020-08-13 | Stop reason: SDUPTHER

## 2020-08-13 RX ORDER — FENTANYL CITRATE 50 UG/ML
INJECTION, SOLUTION INTRAMUSCULAR; INTRAVENOUS PRN
Status: DISCONTINUED | OUTPATIENT
Start: 2020-08-13 | End: 2020-08-13 | Stop reason: SDUPTHER

## 2020-08-13 RX ORDER — DIPHENHYDRAMINE HYDROCHLORIDE 50 MG/ML
6.25 INJECTION INTRAMUSCULAR; INTRAVENOUS
Status: DISCONTINUED | OUTPATIENT
Start: 2020-08-13 | End: 2020-08-13 | Stop reason: HOSPADM

## 2020-08-13 RX ORDER — SODIUM CHLORIDE 0.9 % (FLUSH) 0.9 %
10 SYRINGE (ML) INJECTION EVERY 12 HOURS SCHEDULED
Status: DISCONTINUED | OUTPATIENT
Start: 2020-08-13 | End: 2020-08-13 | Stop reason: HOSPADM

## 2020-08-13 RX ORDER — LABETALOL HYDROCHLORIDE 5 MG/ML
INJECTION, SOLUTION INTRAVENOUS PRN
Status: DISCONTINUED | OUTPATIENT
Start: 2020-08-13 | End: 2020-08-13 | Stop reason: SDUPTHER

## 2020-08-13 RX ORDER — ROCURONIUM BROMIDE 10 MG/ML
INJECTION, SOLUTION INTRAVENOUS PRN
Status: DISCONTINUED | OUTPATIENT
Start: 2020-08-13 | End: 2020-08-13 | Stop reason: SDUPTHER

## 2020-08-13 RX ORDER — OXYCODONE HYDROCHLORIDE AND ACETAMINOPHEN 5; 325 MG/1; MG/1
2 TABLET ORAL PRN
Status: COMPLETED | OUTPATIENT
Start: 2020-08-13 | End: 2020-08-13

## 2020-08-13 RX ORDER — OXYCODONE HYDROCHLORIDE AND ACETAMINOPHEN 5; 325 MG/1; MG/1
1 TABLET ORAL EVERY 4 HOURS PRN
Qty: 20 TABLET | Refills: 0 | Status: SHIPPED | OUTPATIENT
Start: 2020-08-13 | End: 2020-08-16

## 2020-08-13 RX ORDER — ONDANSETRON 2 MG/ML
4 INJECTION INTRAMUSCULAR; INTRAVENOUS EVERY 30 MIN PRN
Status: DISCONTINUED | OUTPATIENT
Start: 2020-08-13 | End: 2020-08-13 | Stop reason: HOSPADM

## 2020-08-13 RX ORDER — SODIUM CHLORIDE, SODIUM LACTATE, POTASSIUM CHLORIDE, AND CALCIUM CHLORIDE .6; .31; .03; .02 G/100ML; G/100ML; G/100ML; G/100ML
IRRIGANT IRRIGATION PRN
Status: DISCONTINUED | OUTPATIENT
Start: 2020-08-13 | End: 2020-08-13 | Stop reason: ALTCHOICE

## 2020-08-13 RX ORDER — SODIUM CHLORIDE, SODIUM LACTATE, POTASSIUM CHLORIDE, CALCIUM CHLORIDE 600; 310; 30; 20 MG/100ML; MG/100ML; MG/100ML; MG/100ML
INJECTION, SOLUTION INTRAVENOUS CONTINUOUS
Status: DISCONTINUED | OUTPATIENT
Start: 2020-08-13 | End: 2020-08-13 | Stop reason: HOSPADM

## 2020-08-13 RX ORDER — MEPERIDINE HYDROCHLORIDE 50 MG/ML
12.5 INJECTION INTRAMUSCULAR; INTRAVENOUS; SUBCUTANEOUS EVERY 5 MIN PRN
Status: DISCONTINUED | OUTPATIENT
Start: 2020-08-13 | End: 2020-08-13 | Stop reason: HOSPADM

## 2020-08-13 RX ORDER — SCOLOPAMINE TRANSDERMAL SYSTEM 1 MG/1
1 PATCH, EXTENDED RELEASE TRANSDERMAL ONCE
Status: DISCONTINUED | OUTPATIENT
Start: 2020-08-13 | End: 2020-08-13 | Stop reason: HOSPADM

## 2020-08-13 RX ADMIN — LIDOCAINE HYDROCHLORIDE 80 MG: 20 INJECTION, SOLUTION INFILTRATION; PERINEURAL at 14:23

## 2020-08-13 RX ADMIN — DEXAMETHASONE SODIUM PHOSPHATE 8 MG: 4 INJECTION, SOLUTION INTRAMUSCULAR; INTRAVENOUS at 14:32

## 2020-08-13 RX ADMIN — ONDANSETRON 4 MG: 2 INJECTION INTRAMUSCULAR; INTRAVENOUS at 14:32

## 2020-08-13 RX ADMIN — HYDRALAZINE HYDROCHLORIDE 5 MG: 20 INJECTION INTRAMUSCULAR; INTRAVENOUS at 15:59

## 2020-08-13 RX ADMIN — ROCURONIUM BROMIDE 50 MG: 10 SOLUTION INTRAVENOUS at 14:23

## 2020-08-13 RX ADMIN — FAMOTIDINE 20 MG: 10 INJECTION, SOLUTION INTRAVENOUS at 14:10

## 2020-08-13 RX ADMIN — HYDRALAZINE HYDROCHLORIDE 5 MG: 20 INJECTION INTRAMUSCULAR; INTRAVENOUS at 16:16

## 2020-08-13 RX ADMIN — FENTANYL CITRATE 100 MCG: 50 INJECTION INTRAMUSCULAR; INTRAVENOUS at 14:23

## 2020-08-13 RX ADMIN — HYDROMORPHONE HYDROCHLORIDE 0.5 MG: 1 INJECTION, SOLUTION INTRAMUSCULAR; INTRAVENOUS; SUBCUTANEOUS at 16:38

## 2020-08-13 RX ADMIN — PROPOFOL 150 MG: 10 INJECTION, EMULSION INTRAVENOUS at 14:23

## 2020-08-13 RX ADMIN — HYDROMORPHONE HYDROCHLORIDE 0.5 MG: 1 INJECTION, SOLUTION INTRAMUSCULAR; INTRAVENOUS; SUBCUTANEOUS at 16:14

## 2020-08-13 RX ADMIN — MIDAZOLAM HYDROCHLORIDE 2 MG: 2 INJECTION, SOLUTION INTRAMUSCULAR; INTRAVENOUS at 14:18

## 2020-08-13 RX ADMIN — SODIUM CHLORIDE, POTASSIUM CHLORIDE, SODIUM LACTATE AND CALCIUM CHLORIDE: 600; 310; 30; 20 INJECTION, SOLUTION INTRAVENOUS at 11:05

## 2020-08-13 RX ADMIN — SUGAMMADEX 200 MG: 100 INJECTION, SOLUTION INTRAVENOUS at 15:32

## 2020-08-13 RX ADMIN — LABETALOL HYDROCHLORIDE 5 MG: 5 INJECTION, SOLUTION INTRAVENOUS at 14:53

## 2020-08-13 RX ADMIN — OXYCODONE HYDROCHLORIDE AND ACETAMINOPHEN 2 TABLET: 5; 325 TABLET ORAL at 17:27

## 2020-08-13 ASSESSMENT — PULMONARY FUNCTION TESTS
PIF_VALUE: 19
PIF_VALUE: 18
PIF_VALUE: 15
PIF_VALUE: 18
PIF_VALUE: 25
PIF_VALUE: 17
PIF_VALUE: 25
PIF_VALUE: 17
PIF_VALUE: 25
PIF_VALUE: 18
PIF_VALUE: 19
PIF_VALUE: 25
PIF_VALUE: 5
PIF_VALUE: 24
PIF_VALUE: 24
PIF_VALUE: 2
PIF_VALUE: 25
PIF_VALUE: 18
PIF_VALUE: 24
PIF_VALUE: 25
PIF_VALUE: 24
PIF_VALUE: 18
PIF_VALUE: 3
PIF_VALUE: 16
PIF_VALUE: 25
PIF_VALUE: 24
PIF_VALUE: 18
PIF_VALUE: 1
PIF_VALUE: 19
PIF_VALUE: 18
PIF_VALUE: 18
PIF_VALUE: 24
PIF_VALUE: 25
PIF_VALUE: 24
PIF_VALUE: 17
PIF_VALUE: 0
PIF_VALUE: 1
PIF_VALUE: 0
PIF_VALUE: 25
PIF_VALUE: 25
PIF_VALUE: 18
PIF_VALUE: 26
PIF_VALUE: 21
PIF_VALUE: 18
PIF_VALUE: 25
PIF_VALUE: 24
PIF_VALUE: 17
PIF_VALUE: 2
PIF_VALUE: 18
PIF_VALUE: 23
PIF_VALUE: 1
PIF_VALUE: 25
PIF_VALUE: 24
PIF_VALUE: 17
PIF_VALUE: 17
PIF_VALUE: 26
PIF_VALUE: 24
PIF_VALUE: 0
PIF_VALUE: 18
PIF_VALUE: 18
PIF_VALUE: 17
PIF_VALUE: 19
PIF_VALUE: 27
PIF_VALUE: 18
PIF_VALUE: 24
PIF_VALUE: 1
PIF_VALUE: 18
PIF_VALUE: 2
PIF_VALUE: 24
PIF_VALUE: 18
PIF_VALUE: 26
PIF_VALUE: 1
PIF_VALUE: 26
PIF_VALUE: 25

## 2020-08-13 ASSESSMENT — PAIN SCALES - GENERAL
PAINLEVEL_OUTOF10: 3
PAINLEVEL_OUTOF10: 7
PAINLEVEL_OUTOF10: 4
PAINLEVEL_OUTOF10: 7

## 2020-08-13 ASSESSMENT — PAIN DESCRIPTION - PAIN TYPE: TYPE: SURGICAL PAIN

## 2020-08-13 ASSESSMENT — PAIN - FUNCTIONAL ASSESSMENT: PAIN_FUNCTIONAL_ASSESSMENT: 0-10

## 2020-08-13 ASSESSMENT — PAIN DESCRIPTION - LOCATION: LOCATION: ABDOMEN

## 2020-08-13 NOTE — ANESTHESIA POSTPROCEDURE EVALUATION
Department of Anesthesiology  Postprocedure Note    Patient: Rachel Pallas  MRN: [id number]  YOB: 1955  Date of evaluation: 8/13/2020  Time:  5:58 PM     Procedure Summary     Date:  08/13/20 Room / Location:  [address] [place] [address]    Anesthesia Start:  1418 Anesthesia Stop:  4978    Procedure:  LAPAROSCOPIC CHOLECYSTECTOMY WITH INTRAOPERATIVE CHOLANGIOGRAM (N/A Abdomen) Diagnosis:       Symptomatic cholelithiasis      (SYMPTOMATIC CHOLELITHIASIS)    Surgeon:  Kelly Sorenson MD Responsible Provider:  Beau Delacrzu MD    Anesthesia Type:  general ASA Status:  2          Anesthesia Type: general    Bela Phase I: Bela Score: 10    Bela Phase II: Bela Score: 10    Last vitals: Reviewed and per EMR flowsheets.        Anesthesia Post Evaluation    Comments: Postoperative Anesthesia Note    Name:    Rachel Pallas  MRN:      [id number]    Patient Vitals in the past 12 hrs:  08/13/20 1705, Pulse:67, Resp:17, SpO2:95 %  08/13/20 1700, BP:(!) 156/65, Pulse:54, Resp:16, SpO2:95 %  08/13/20 1655, BP:(!) 159/71, Pulse:53, Resp:17, SpO2:96 %  08/13/20 1650, BP:(!) 170/67, Pulse:51, Resp:15, SpO2:97 %  08/13/20 1645, BP:(!) 155/64, Pulse:53, Resp:20, SpO2:97 %  08/13/20 1640, BP:(!) 174/79, Pulse:60, Resp:17, SpO2:99 %  08/13/20 1635, BP:(!) 165/79, Pulse:54, Resp:16, SpO2:95 %  08/13/20 1630, BP:(!) 166/68, Pulse:(!) 48, Resp:19, SpO2:96 %  08/13/20 1625, BP:(!) 161/69, Pulse:(!) 49, Resp:18, SpO2:96 %  08/13/20 1620, BP:(!) 176/73, Pulse:55, Resp:17, SpO2:96 %  08/13/20 1619, Temp:98.1 °F (36.7 °C), Temp src:Temporal  08/13/20 1616, BP:(!) 184/83  08/13/20 1615, BP:(!) 184/83, Pulse:66, Resp:18, SpO2:97 %  08/13/20 1555, Pulse:(!) 47, Resp:11, SpO2:92 %  08/13/20 1550, BP:(!) 183/84, Pulse:72, Resp:11, SpO2:95 %  08/13/20 1545, BP:(!) 169/90, Temp:96.4 °F (35.8 °C), Temp src:Temporal, Pulse:68, Resp:19, SpO2:95 %  08/13/20 1101, BP:(!) 154/83, Temp:98.2 °F (36.8 °C), Temp

## 2020-08-13 NOTE — ANESTHESIA PRE PROCEDURE
times per day Erica Mac MD        sodium chloride flush 0.9 % injection 10 mL  10 mL Intravenous PRN Erica Mac MD        HYDROmorphone (DILAUDID) injection 0.5 mg  0.5 mg Intravenous Q10 Min PRN Ana Roman MD        HYDROmorphone (DILAUDID) injection 0.5 mg  0.5 mg Intravenous Q5 Min PRN Ana Roman MD        oxyCODONE-acetaminophen (PERCOCET) 5-325 MG per tablet 1 tablet  1 tablet Oral PRN Ana Roman MD        Or    oxyCODONE-acetaminophen (PERCOCET) 5-325 MG per tablet 2 tablet  2 tablet Oral PRN Ana Roman MD        diphenhydrAMINE (BENADRYL) injection 6.25 mg  6.25 mg Intravenous Once PRN Ana Roman MD        ondansetron [facility]) injection 4 mg  4 mg Intravenous Q30 Min PRN Ana Roman MD        labetalol (NORMODYNE;TRANDATE) injection 5 mg  5 mg Intravenous Q15 Min PRN Ana Roman MD        hydrALAZINE (APRESOLINE) injection 5 mg  5 mg Intravenous Q30 Min PRN Ana Roman MD        meperidine (DEMEROL) injection 12.5 mg  12.5 mg Intravenous Q5 Min PRN Ana Roman MD           Allergies:  No Known Allergies    Problem List:    Patient Active Problem List   Diagnosis Code    Mesenteric vein thrombosis I81    Essential hypertension I10    Mixed hyperlipidemia E78.2    Chronic abdominal pain R10.9, G89.29    Stage 3 chronic kidney disease ([facility] [address] N18.3    Symptomatic cholelithiasis K80.20       Past Medical History:        Diagnosis Date    Hx of blood clots     Hyperlipidemia     Hypertension     PONV (postoperative nausea and vomiting)     Symptomatic cholelithiasis     Thrombosis        Past Surgical History:        Procedure Laterality Date    ANKLE SURGERY      left    COLONOSCOPY  03/08/2017    SHOULDER SURGERY Right 2012    TONSILLECTOMY      WRIST SURGERY      right       Social History:    Social History     Tobacco Use    Smoking status: Never Smoker    Smokeless tobacco: Never Used   Substance Use Topics    Alcohol use: Yes     Comment: 1-2 drinks per year                                Counseling given: Not Answered      Vital Signs (Current):   Vitals:    08/12/20 0940 08/13/20 1101   BP:  (!) 154/83   Pulse:  56   Resp:  14   Temp:  98.2 °F (36.8 °C)   TempSrc:  Temporal   SpO2:  98%   Weight: 196 lb (88.9 kg) 199 lb 14.4 oz (90.7 kg)   Height: 5' 11\" (1.803 m) 5' 11\" (1.803 m)                                              BP Readings from Last 3 Encounters:   08/13/20 (!) 154/83   08/12/20 136/68   08/11/20 137/83       NPO Status: Time of last liquid consumption: 2230                        Time of last solid consumption: 2230                        Date of last liquid consumption: 08/12/20                        Date of last solid food consumption: 08/12/20    BMI:   Wt Readings from Last 3 Encounters:   08/13/20 199 lb 14.4 oz (90.7 kg)   08/12/20 202 lb 12.8 oz (92 kg)   08/11/20 205 lb (93 kg)     Body mass index is 27.88 kg/m². CBC:   Lab Results   Component Value Date    WBC 7.7 08/11/2020    RBC 5.16 08/11/2020    HGB 15.0 08/11/2020    HCT 45.2 08/11/2020    MCV 87.5 08/11/2020    RDW 13.4 08/11/2020     08/11/2020       CMP:   Lab Results   Component Value Date     08/13/2020    K 4.7 08/13/2020     08/13/2020    CO2 25 08/13/2020    BUN 22 08/13/2020    CREATININE 1.4 08/13/2020    GFRAA >60 08/13/2020    AGRATIO 1.6 08/11/2020    LABGLOM 51 08/13/2020    GLUCOSE 120 08/13/2020    PROT 7.0 08/11/2020    CALCIUM 9.7 08/13/2020    BILITOT 0.5 08/11/2020    ALKPHOS 24 08/11/2020    AST 23 08/11/2020    ALT 28 08/11/2020       POC Tests: No results for input(s): POCGLU, POCNA, POCK, POCCL, POCBUN, POCHEMO, POCHCT in the last 72 hours.     Coags:   Lab Results   Component Value Date    PROTIME 13.5 02/19/2018    INR 1.19 02/19/2018    APTT 73.9 02/21/2018       HCG (If Applicable): No results found for: PREGTESTUR, PREGSERUM, HCG, HCGQUANT     ABGs: No results found for: PHART, PO2ART, YXG0IHW, BFN3KNJ, BEART, J6EQYDCA     Type & Screen (If Applicable):  No results found for: LABABO, LABRH    Drug/Infectious Status (If Applicable):  No results found for: HIV, HEPCAB    COVID-19 Screening (If Applicable): No results found for: COVID19      Anesthesia Evaluation  Patient summary reviewed and Nursing notes reviewed history of anesthetic complications:   Airway: Mallampati: II     Neck ROM: full   Dental:          Pulmonary:                              Cardiovascular:    (+) hypertension:,                   Neuro/Psych:               GI/Hepatic/Renal:             Endo/Other:                     Abdominal:           Vascular:                                        Anesthesia Plan      general     ASA 2     (Medications & allergies reviewed  All available lab & EKG data reviewed)  Induction: intravenous. Anesthetic plan and risks discussed with patient. Plan discussed with CRNA.                   Richi Mcmahan MD   8/13/2020

## 2020-08-13 NOTE — H&P
I have reviewed the history and physical and examined the patient. I find no relevant changes. I have reviewed with the patient and/or family members, during the preoperative office visit the risks, benefits, and alternatives to the procedure.     [employer] THOMAS

## 2020-08-13 NOTE — OP NOTE
Operative Note      Patient: Win Hanna  YOB: 1955  MRN: [id number]    Date of Procedure: 8/13/2020    Pre-Op Diagnosis: SYMPTOMATIC CHOLELITHIASIS    Post-Op Diagnosis: Same, umbilical hernia       Procedure(s):  LAPAROSCOPIC CHOLECYSTECTOMY WITH INTRAOPERATIVE CHOLANGIOGRAM; Umbilical hernia repair    Surgeon(s):  Chelsi Rodríguez MD    Assistant:   Surgical Assistant: Ysabel Kirk    Anesthesia: General    Estimated Blood Loss (mL): less than 50     Complications: None    Specimens:   ID Type Source Tests Collected by Time Destination   A : GALLBLADDER AND CONTENTS Tissue Gallbladder SURGICAL PATHOLOGY Chelsi Rodríguez MD 8/13/2020 1459        Implants:  * No implants in log *      Drains: * No LDAs found *    Findings: 1) mildly inflamed GB w/ small stones                  2) IOC - no filling defects, (+) flow into duod                  3) ~8.0JP umbilical hernia, closed primarily      Detailed Description of Procedure:   OPERATIVE NOTE      DESCRIPTION OF PROCEDURE: The patient was brought into the operating room   theater, placed supine on the operating room table, administered general anesthesia, intubated. The abdomen was then prepped and draped in a normal sterile fashion. A 5mm trocar incision was made in the LUQ just off midline after infiltrating with local anesthesia. A trocar was placed through abdominal wall layers into peritoneal cavity under direct vision. The   abdomen was inspected. Under direct vision, a 12mm trocar was placed through a infraumbilical incision. Two more 5mm trocars were placed in the right lateral subcostal region. There was noted to be an umbilical hernia, reducible, which I placed the 12mm trocar through. The gallbladder appeared mildly inflamed, mildly shrunken. The fundus was grasped and elevated in a cranial direction. The infundibulum was retracted laterally.   I needed an extra trocar in the right lower quadrant to place a liver retractor and thus allow us to retract the transverse colon for better exposure of the base of the gallbladder. The cystic duct was dissected circumferentially at its junction with the gallbladder. The cystic artery was identified medially and dissected in a similar manner. .   A locking clip was placed on the cystic duct at junction with gallbladder. The cystic duct was then partially opened with scissors just proximal to the clip. Tonye Cogan An intraoperative cholangiogram was shot. We appeared   to have good visualization of both left and right intrahepatic ducts, the   cystic duct, and common bile duct. Contrast was seen spilling into the   duodenum. There did not appear to be any obstruction. At this point, the   catheter was removed. We placed 2 clips proximally on the   cystic duct and fully transected the duct between the clips. The cystic artery was clipped and cut in a similar manner to the duct. The gallbladder was now gradually mobilized off the liver bed and released. Gallbladder was placed into a sterile pouch. We then used suction irrigation to irrigate the gallbladder fossa and inspect   our clip sites. Hemostasis was observed & the clips were intact. At this point, we   removed all of our ports under direct visualization. The umbilical fascial defect was cleared off and the overlying umbilical skin released from the hernia sac. The umbilical fascial defect was closed with interrupted horizontal mattress 0-Ethibond sutures. Skin incisions all closed with 4-0 Monocryl subcuticular suture and Dermabond. The patient was extubated and taken to recovery in stable condition. All lap counts, instrument counts, and needle counts were correct at the completion of the procedure.      Electronically signed by Avery THOMAS  8/13/2020  3:42 PM          Electronically signed by Ricardo Cummings MD on 8/13/2020 at 3:40 PM

## 2020-08-25 ENCOUNTER — OFFICE VISIT (OUTPATIENT)
Dept: SURGERY | Age: 65
End: 2020-08-25

## 2020-08-25 VITALS
BODY MASS INDEX: 27.86 KG/M2 | WEIGHT: 199 LBS | TEMPERATURE: 97.1 F | HEIGHT: 71 IN | SYSTOLIC BLOOD PRESSURE: 151 MMHG | HEART RATE: 59 BPM | DIASTOLIC BLOOD PRESSURE: 93 MMHG

## 2020-08-25 PROCEDURE — 99024 POSTOP FOLLOW-UP VISIT: CPT | Performed by: SURGERY

## 2020-08-25 NOTE — PROGRESS NOTES
Surgery Post-op Progress Note    HPI:  Notes reviewed, and agree with documentation in pt's chart. Postoperative Follow-up: Patient presents for 2 week follow-up status post lap mariano w/ IOC as well as primary repair of small umbilical hernia . Eating a regular diet without difficulty. Bowel movements are Normal.  The patient is not having any pain. .     ROS:    · 10 point review of systems performed; please refer to HPI with pertinent positives, all other ROS are negative    PE:   CONSTITUTIONAL:  awake and alert    ABDOMEN: soft, non-distended, non-tender     INCISION: clean, dry, no drainage, healing      ASSESSMENT:   Diagnosis Orders   1.  Postop check     ·       PLAN:    Continue with routine wound care as discussed  Gradually increase activities as tolerated  Follow-up as needed; please call with questions or concerns  ·

## 2020-09-16 ENCOUNTER — OFFICE VISIT (OUTPATIENT)
Dept: CARDIOLOGY CLINIC | Age: 65
End: 2020-09-16
Payer: MEDICARE

## 2020-09-16 VITALS
HEART RATE: 58 BPM | HEIGHT: 71 IN | DIASTOLIC BLOOD PRESSURE: 72 MMHG | WEIGHT: 197.5 LBS | OXYGEN SATURATION: 99 % | BODY MASS INDEX: 27.65 KG/M2 | SYSTOLIC BLOOD PRESSURE: 154 MMHG

## 2020-09-16 PROBLEM — R01.1 MURMUR: Status: ACTIVE | Noted: 2020-09-16

## 2020-09-16 PROCEDURE — 1123F ACP DISCUSS/DSCN MKR DOCD: CPT | Performed by: INTERNAL MEDICINE

## 2020-09-16 PROCEDURE — 1036F TOBACCO NON-USER: CPT | Performed by: INTERNAL MEDICINE

## 2020-09-16 PROCEDURE — 3017F COLORECTAL CA SCREEN DOC REV: CPT | Performed by: INTERNAL MEDICINE

## 2020-09-16 PROCEDURE — G8417 CALC BMI ABV UP PARAM F/U: HCPCS | Performed by: INTERNAL MEDICINE

## 2020-09-16 PROCEDURE — G8427 DOCREV CUR MEDS BY ELIG CLIN: HCPCS | Performed by: INTERNAL MEDICINE

## 2020-09-16 PROCEDURE — 4040F PNEUMOC VAC/ADMIN/RCVD: CPT | Performed by: INTERNAL MEDICINE

## 2020-09-16 PROCEDURE — 99214 OFFICE O/P EST MOD 30 MIN: CPT | Performed by: INTERNAL MEDICINE

## 2020-09-16 RX ORDER — AMLODIPINE BESYLATE 2.5 MG/1
2.5 TABLET ORAL 2 TIMES DAILY
Qty: 180 TABLET | Refills: 3 | Status: SHIPPED | OUTPATIENT
Start: 2020-09-16 | End: 2020-11-18

## 2020-09-16 NOTE — PROGRESS NOTES
[facility]   CARDIAC EVALUATION NOTE  [phone]      PCP:  Agnieszka BERNAL DO    Reason for Consultation/Chief Complaint: follow up for HTN    Subjective   History of Present Illness:  Geroge Hashimoto is a 76 y.o. patient with a history of HTN, HLD and mesenteric vein thrombosis who presents to establish with cardiology. He moved from [place], [place] in 10/2016. He reports he started seeing Dr. Aarti Diaz at age 47 for about 10 years. He started seeing the cardiologist for screening due to his father's vascular and heart disease. He has had 3 stress tests with the cardiologist. He has since loss 30 lbs of weight since retiring. He has made several diet changes. He had mesenteric vein thrombosis in 02/2018. He was on Xarelto for a short period and has since stopped the Xarelto. At his OV on 12/09/19 he was started on Norvasc 2.5 mg daily. Today his BP is elevated on exam his home SBP is typically in the 130s. He reports his BP has been elevated at home this last 2 weeks. He denies CP, SOB, dizziness or syncope. Past Medical History:   has a past medical history of Hx of blood clots, Hyperlipidemia, Hypertension, PONV (postoperative nausea and vomiting), Symptomatic cholelithiasis, and Thrombosis. Surgical History:   has a past surgical history that includes Ankle surgery; Wrist surgery; Tonsillectomy; shoulder surgery (Right, 2012); Colonoscopy (03/08/2017); and Cholecystectomy, laparoscopic (N/A, 8/13/2020). Social History:   reports that he has never smoked. He has never used smokeless tobacco. He reports current alcohol use. He reports that he does not use drugs. Family History:  family history includes Breast Cancer in his mother; Coronary Art Dis in his father; Diabetes in his sister. Home Medications:  Were reviewed and are listed in nursing record and/or below  Prior to Admission medications    Medication Sig Start Date End Date Taking?  Authorizing Provider rivaroxaban (XARELTO) 20 MG TABS tablet Take 20 mg by mouth daily (with breakfast)   Yes Historical Provider, MD   fenofibrate (TRICOR) 145 MG tablet Take 1 tablet by mouth daily TAKE 1 TABLET BY MOUTH ONCE DAILY 3/9/20  Yes Shola Yoo, APRN - CNP   simvastatin (ZOCOR) 20 MG tablet Take 1 tablet by mouth nightly 3/9/20  Yes Rehanaelee Render, APRN - CNP   amLODIPine (NORVASC) 2.5 MG tablet Take 1 tablet by mouth daily 3/9/20  Yes Lorelee Render, APRN - CNP   lisinopril (PRINIVIL;ZESTRIL) 5 MG tablet Take 1 tablet by mouth daily 3/9/20  Yes Madisyn Francois APRN - CNP   aspirin 81 MG tablet Take 81 mg by mouth daily    Historical Provider, MD          Allergies:  Patient has no known allergies. Review of Systems:   A 14 point review of symptoms completed. Pertinent positives identified in the HPI, all other review of symptoms negative as below.       Objective   PHYSICAL EXAM:    Vitals:    09/16/20 1144   BP: (!) 154/72   Pulse:    SpO2:     Weight: 197 lb 8 oz (89.6 kg)         General Appearance:  Alert, cooperative, no distress, appears stated age   Head:  Normocephalic, without obvious abnormality, atraumatic   Eyes:  PERRL, conjunctiva/corneas clear   Nose: Nares normal, no drainage or sinus tenderness   Throat: Lips, mucosa, and tongue normal   Neck: Supple, symmetrical, trachea midline, no adenopathy, thyroid: not enlarged, symmetric, no tenderness/mass/nodules, no carotid bruit or JVD   Lungs:   Clear to auscultation bilaterally, respirations unlabored   Chest Wall:  No deformity or tenderness   Heart:  Regular rate and rhythm, S1, S2 normal,  1/6 sys Murmur present, no rub or gallop   Abdomen:   Soft, non-tender, bowel sounds active all four quadrants,  no masses, no organomegaly   Extremities: Extremities normal, atraumatic, no cyanosis or edema   Pulses: 2+ and symmetric   Skin: Skin color, texture, turgor normal, no rashes or lesions   Pysch: Normal mood and affect   Neurologic: Normal gross motor and sensory exam.         Labs   CBC:   Lab Results   Component Value Date    WBC 7.7 2020    RBC 5.16 2020    HGB 15.0 2020    HCT 45.2 2020    MCV 87.5 2020    RDW 13.4 2020     2020     CMP:  Lab Results   Component Value Date     2020    K 4.7 2020     2020    CO2 25 2020    BUN 22 2020    CREATININE 1.4 2020    GFRAA >60 2020    AGRATIO 1.6 2020    LABGLOM 51 2020    GLUCOSE 120 2020    PROT 7.0 2020    CALCIUM 9.7 2020    BILITOT 0.5 2020    ALKPHOS 24 2020    AST 23 2020    ALT 28 2020     PT/INR:  No results found for: PTINR  HgBA1c:  Lab Results   Component Value Date    LABA1C 7.0 2018     No results found for: CKTOTAL, CKMB, CKMBINDEX, TROPONINI      Cardiac Data     Last EK20  SR HR 63   Echo:    Stress Test:    Cath:    Studies:       I have reviewed labs and imaging/xray/diagnostic testing in this note. Assessment      1. Essential hypertension    2. Mixed hyperlipidemia    3. Murmur              Plan   1. Echo for murmur  2. Increase amlodipine to 2.5 mg twice daily  3. Follow up in 2 months with Lea Monge attestation: This note was scribed in the presence of Dr. Hussein Lopez by Trey Benjamin RN      Thank you for allowing us to participate in the care of [facility]. Please call me with any questions [phone]. Hussein Lopez MD, [facility]   Interventional Cardiologist  [facility]  [phone] [facility]  [phone] [address] [place] [address]  2020 11:57 AM    I will address the patient's cardiac risk factors and adjusted pharmacologic treatment as needed. In addition, I have reinforced the need for patient directed risk factor modification. Tobacco use was discussed with the patient and educated on the negative effects and was asked not to use.  All questions and concerns were addressed to the patient/family. Alternatives to my treatment were discussed. I, Dr Jose Copeland, personally performed the services described in this documentation, as scribed by the above signed scribe in my presence. It is both accurate and complete to my knowledge. I agree with the details independently gathered by the clinical support staff and the scribed note accurately describes my personal service to the patient.

## 2020-09-16 NOTE — LETTER
[facility]   CARDIAC EVALUATION NOTE  [phone]      PCP:  Vega BERNAL DO    Reason for Consultation/Chief Complaint: follow up for HTN    Subjective   History of Present Illness:  Shannon Leonard is a 76 y.o. patient with a history of HTN, HLD and mesenteric vein thrombosis who presents to establish with cardiology. He moved from [place], [place] in 10/2016. He reports he started seeing Dr. Barber Prior at age 47 for about 10 years. He started seeing the cardiologist for screening due to his father's vascular and heart disease. He has had 3 stress tests with the cardiologist. He has since loss 30 lbs of weight since retiring. He has made several diet changes. He had mesenteric vein thrombosis in 02/2018. He was on Xarelto for a short period and has since stopped the Xarelto. At his OV on 12/09/19 he was started on Norvasc 2.5 mg daily. Today his BP is elevated on exam his home SBP is typically in the 130s. He reports his BP has been elevated at home this last 2 weeks. He denies CP, SOB, dizziness or syncope. Past Medical History:   has a past medical history of Hx of blood clots, Hyperlipidemia, Hypertension, PONV (postoperative nausea and vomiting), Symptomatic cholelithiasis, and Thrombosis. Surgical History:   has a past surgical history that includes Ankle surgery; Wrist surgery; Tonsillectomy; shoulder surgery (Right, 2012); Colonoscopy (03/08/2017); and Cholecystectomy, laparoscopic (N/A, 8/13/2020). Social History:   reports that he has never smoked. He has never used smokeless tobacco. He reports current alcohol use. He reports that he does not use drugs. Family History:  family history includes Breast Cancer in his mother; Coronary Art Dis in his father; Diabetes in his sister.       Home Medications:  Were reviewed and are listed in nursing record and/or below  Prior to Admission medications Medication Sig Start Date End Date Taking? Authorizing Provider   rivaroxaban (XARELTO) 20 MG TABS tablet Take 20 mg by mouth daily (with breakfast)   Yes Historical Provider, MD   fenofibrate (TRICOR) 145 MG tablet Take 1 tablet by mouth daily TAKE 1 TABLET BY MOUTH ONCE DAILY 3/9/20  Yes JOLANTA Stauffer CNP   simvastatin (ZOCOR) 20 MG tablet Take 1 tablet by mouth nightly 3/9/20  Yes JOLANTA Stauffer CNP   amLODIPine (NORVASC) 2.5 MG tablet Take 1 tablet by mouth daily 3/9/20  Yes JOLANTA Stauffer CNP   lisinopril (PRINIVIL;ZESTRIL) 5 MG tablet Take 1 tablet by mouth daily 3/9/20  Yes Gleen Llano Perri Gosselin, APRN - CNP   aspirin 81 MG tablet Take 81 mg by mouth daily    Historical Provider, MD          Allergies:  Patient has no known allergies. Review of Systems:   A 14 point review of symptoms completed. Pertinent positives identified in the HPI, all other review of symptoms negative as below.       Objective   PHYSICAL EXAM:    Vitals:    09/16/20 1144   BP: (!) 154/72   Pulse:    SpO2:     Weight: 197 lb 8 oz (89.6 kg)         General Appearance:  Alert, cooperative, no distress, appears stated age   Head:  Normocephalic, without obvious abnormality, atraumatic   Eyes:  PERRL, conjunctiva/corneas clear   Nose: Nares normal, no drainage or sinus tenderness   Throat: Lips, mucosa, and tongue normal   Neck: Supple, symmetrical, trachea midline, no adenopathy, thyroid: not enlarged, symmetric, no tenderness/mass/nodules, no carotid bruit or JVD   Lungs:   Clear to auscultation bilaterally, respirations unlabored   Chest Wall:  No deformity or tenderness   Heart:  Regular rate and rhythm, S1, S2 normal,  1/6 sys Murmur present, no rub or gallop   Abdomen:   Soft, non-tender, bowel sounds active all four quadrants,  no masses, no organomegaly   Extremities: Extremities normal, atraumatic, no cyanosis or edema   Pulses: 2+ and symmetric Skin: Skin color, texture, turgor normal, no rashes or lesions   Pysch: Normal mood and affect   Neurologic: Normal gross motor and sensory exam.         Labs   CBC:   Lab Results   Component Value Date    WBC 7.7 2020    RBC 5.16 2020    HGB 15.0 2020    HCT 45.2 2020    MCV 87.5 2020    RDW 13.4 2020     2020     CMP:  Lab Results   Component Value Date     2020    K 4.7 2020     2020    CO2 25 2020    BUN 22 2020    CREATININE 1.4 2020    GFRAA >60 2020    AGRATIO 1.6 2020    LABGLOM 51 2020    GLUCOSE 120 2020    PROT 7.0 2020    CALCIUM 9.7 2020    BILITOT 0.5 2020    ALKPHOS 24 2020    AST 23 2020    ALT 28 2020     PT/INR:  No results found for: PTINR  HgBA1c:  Lab Results   Component Value Date    LABA1C 7.0 2018     No results found for: CKTOTAL, CKMB, CKMBINDEX, TROPONINI      Cardiac Data     Last EK20  SR HR 63   Echo:    Stress Test:    Cath:    Studies:       I have reviewed labs and imaging/xray/diagnostic testing in this note. Assessment      1. Essential hypertension    2. Mixed hyperlipidemia    3. Murmur              Plan   1. Echo for murmur  2. Increase amlodipine to 2.5 mg twice daily  3. Follow up in 2 months with Karen Dennis attestation: This note was scribed in the presence of Dr. Sabrina Wolfe by Ely Henley RN      Thank you for allowing us to participate in the care of [facility]. Please call me with any questions [phone]. Sabrina Wolfe MD, [facility]   Interventional Cardiologist  [facility]  [phone] [facility]  [phone] [address] [place] [address]  2020 11:57 AM    I will address the patient's cardiac risk factors and adjusted pharmacologic treatment as needed. In addition, I have reinforced the need for patient directed risk factor modification. Tobacco use was discussed with the patient and educated on the negative effects and was asked not to use. All questions and concerns were addressed to the patient/family. Alternatives to my treatment were discussed. I, Dr Anjelica Cast, personally performed the services described in this documentation, as scribed by the above signed scribe in my presence. It is both accurate and complete to my knowledge. I agree with the details independently gathered by the clinical support staff and the scribed note accurately describes my personal service to the patient.

## 2020-09-16 NOTE — PATIENT INSTRUCTIONS
1. Echo for murmur  2. Increase amlodipine to 2.5 mg twice daily  3. Follow up in 2 months with Vineet Lynn      Your provider has ordered testing for further evaluation. An order/prescription has been included in your paper work.  To schedule outpatient testing, contact Central Scheduling by calling [address] [place], [place] [address]ED[place] ([phone]).

## 2020-09-23 ENCOUNTER — TELEPHONE (OUTPATIENT)
Dept: ORTHOPEDIC SURGERY | Age: 65
End: 2020-09-23

## 2020-09-25 ENCOUNTER — HOSPITAL ENCOUNTER (OUTPATIENT)
Dept: CARDIOLOGY | Age: 65
Discharge: HOME | End: 2020-09-25
Payer: MEDICARE

## 2020-09-25 ENCOUNTER — TELEPHONE (OUTPATIENT)
Dept: CARDIOLOGY CLINIC | Age: 65
End: 2020-09-25

## 2020-09-25 LAB
LV EF: 55 %
LVEF MODALITY: NORMAL

## 2020-09-25 PROCEDURE — 93306 TTE W/DOPPLER COMPLETE: CPT

## 2020-09-25 NOTE — TELEPHONE ENCOUNTER
Created telephone encounter. Spoke with Jarod Arango relayed message per [facility] regarding echo. Pt verbalized understanding.

## 2020-10-08 ENCOUNTER — HOSPITAL ENCOUNTER (OUTPATIENT)
Age: 65
Discharge: HOME | End: 2020-10-08
Payer: MEDICARE

## 2020-10-08 LAB
ALBUMIN SERPL-MCNC: 4.5 G/DL (ref 3.4–5)
ANION GAP SERPL CALCULATED.3IONS-SCNC: 13 MMOL/L (ref 3–16)
BUN BLDV-MCNC: 24 MG/DL (ref 7–20)
CALCIUM SERPL-MCNC: 9.4 MG/DL (ref 8.3–10.6)
CHLORIDE BLD-SCNC: 103 MMOL/L (ref 99–110)
CO2: 25 MMOL/L (ref 21–32)
CREAT SERPL-MCNC: 2.2 MG/DL (ref 0.8–1.3)
GFR AFRICAN AMERICAN: 36
GFR NON-AFRICAN AMERICAN: 30
GLUCOSE BLD-MCNC: 184 MG/DL (ref 70–99)
PHOSPHORUS: 3.4 MG/DL (ref 2.5–4.9)
POTASSIUM SERPL-SCNC: 4.6 MMOL/L (ref 3.5–5.1)
SODIUM BLD-SCNC: 141 MMOL/L (ref 136–145)

## 2020-10-08 PROCEDURE — 36415 COLL VENOUS BLD VENIPUNCTURE: CPT

## 2020-10-08 PROCEDURE — 80069 RENAL FUNCTION PANEL: CPT

## 2020-11-05 ENCOUNTER — HOSPITAL ENCOUNTER (OUTPATIENT)
Age: 65
Discharge: HOME | End: 2020-11-05
Payer: MEDICARE

## 2020-11-05 LAB
ALBUMIN SERPL-MCNC: 4.9 G/DL (ref 3.4–5)
ANION GAP SERPL CALCULATED.3IONS-SCNC: 16 MMOL/L (ref 3–16)
BUN BLDV-MCNC: 26 MG/DL (ref 7–20)
CALCIUM SERPL-MCNC: 9.6 MG/DL (ref 8.3–10.6)
CHLORIDE BLD-SCNC: 100 MMOL/L (ref 99–110)
CO2: 22 MMOL/L (ref 21–32)
CREAT SERPL-MCNC: 1.5 MG/DL (ref 0.8–1.3)
GFR AFRICAN AMERICAN: 57
GFR NON-AFRICAN AMERICAN: 47
GLUCOSE BLD-MCNC: 204 MG/DL (ref 70–99)
PHOSPHORUS: 3.1 MG/DL (ref 2.5–4.9)
POTASSIUM SERPL-SCNC: 4.1 MMOL/L (ref 3.5–5.1)
SODIUM BLD-SCNC: 138 MMOL/L (ref 136–145)

## 2020-11-05 PROCEDURE — 36415 COLL VENOUS BLD VENIPUNCTURE: CPT

## 2020-11-05 PROCEDURE — 80069 RENAL FUNCTION PANEL: CPT

## 2020-11-18 ENCOUNTER — OFFICE VISIT (OUTPATIENT)
Dept: CARDIOLOGY CLINIC | Age: 65
End: 2020-11-18
Payer: MEDICARE

## 2020-11-18 VITALS
OXYGEN SATURATION: 98 % | HEART RATE: 76 BPM | WEIGHT: 203 LBS | SYSTOLIC BLOOD PRESSURE: 130 MMHG | BODY MASS INDEX: 28.42 KG/M2 | HEIGHT: 71 IN | DIASTOLIC BLOOD PRESSURE: 78 MMHG

## 2020-11-18 PROCEDURE — 4040F PNEUMOC VAC/ADMIN/RCVD: CPT | Performed by: NURSE PRACTITIONER

## 2020-11-18 PROCEDURE — G8484 FLU IMMUNIZE NO ADMIN: HCPCS | Performed by: NURSE PRACTITIONER

## 2020-11-18 PROCEDURE — 99213 OFFICE O/P EST LOW 20 MIN: CPT | Performed by: NURSE PRACTITIONER

## 2020-11-18 PROCEDURE — 3017F COLORECTAL CA SCREEN DOC REV: CPT | Performed by: NURSE PRACTITIONER

## 2020-11-18 PROCEDURE — G8427 DOCREV CUR MEDS BY ELIG CLIN: HCPCS | Performed by: NURSE PRACTITIONER

## 2020-11-18 PROCEDURE — 1036F TOBACCO NON-USER: CPT | Performed by: NURSE PRACTITIONER

## 2020-11-18 PROCEDURE — G8417 CALC BMI ABV UP PARAM F/U: HCPCS | Performed by: NURSE PRACTITIONER

## 2020-11-18 PROCEDURE — 1123F ACP DISCUSS/DSCN MKR DOCD: CPT | Performed by: NURSE PRACTITIONER

## 2020-11-18 RX ORDER — AMLODIPINE BESYLATE 5 MG/1
5 TABLET ORAL 2 TIMES DAILY
Qty: 60 TABLET | Refills: 5 | Status: SHIPPED | OUTPATIENT
Start: 2020-11-18 | End: 2021-01-26 | Stop reason: SDUPTHER

## 2020-11-18 NOTE — PROGRESS NOTES
[address]   Cardiac Evaluation    Primary Care Doctor:  [facility] DO DOLORES    Chief Complaint   Patient presents with    Follow-up     no cardiac complaints         History of Present Illness:   I had the pleasure of seeing Yosi Delgado in follow up for  HTN, HLD as well as CKD3 and hx of mesenteric vein thrombosis. In September his blood pressure was elevated so the amlodipine was increased to 2.5 mg twice daily. An echocardiogram was completed due to concern of murmur. This revealed normal LV function and no significant valvular abnormalities. His BP was up initially but improved on recheck today. His BP at home runs 130s/70s. He has cut out sugar in his diet but feels is eating more of the healthy food. His weight is stable. He is followed up with his nephrologist who feels the lisinopril is contributing to his renal dysfunction and has cut this dose back. He is now on 2.5 mg lisinopril. He is tolerating the increased dose of amlodipine to 2.5 mg twice daily. He was seen in the emergency room for cholecystitis. CT of the abdomen pelvis showed new subocclusive superior mesenteric vein thrombosis. He was started back on Xarelto. He did undergo cholecystectomy as well as umbilical hernia repair during that hospitalization. He follows with Dr. Michell Tan for further evaluation of clotting disorder having undergone a thorough work-up in the past.  All of this has so far been negative. Yosi Delgado describes symptoms including none but denies chest pain, dyspnea, palpitations, orthopnea, PND, fatigue, early saiety, edema, syncope. NYHA:   I  ACC/ AHA Stage:    B    Past Medical History:   has a past medical history of Hx of blood clots, Hyperlipidemia, Hypertension, PONV (postoperative nausea and vomiting), Symptomatic cholelithiasis, and Thrombosis. Surgical History:   has a past surgical history that includes Ankle surgery; Wrist surgery;  Tonsillectomy; shoulder surgery (Right, 2012); Colonoscopy (03/08/2017); and Cholecystectomy, laparoscopic (N/A, 8/13/2020). Social History:   reports that he has never smoked. He has never used smokeless tobacco. He reports current alcohol use. He reports that he does not use drugs. Family History:   Family History   Problem Relation Age of Onset    Breast Cancer Mother     Coronary Art Dis Father     Diabetes Sister        Home Medications:  Prior to Admission medications    Medication Sig Start Date End Date Taking? Authorizing Provider   amLODIPine (NORVASC) 5 MG tablet Take 1 tablet by mouth 2 times daily 11/18/20  Yes JOLANTA Schultz CNP   rivaroxaban (XARELTO) 20 MG TABS tablet Take 10 mg by mouth daily (with breakfast)    Yes Historical Provider, MD   fenofibrate (TRICOR) 145 MG tablet Take 1 tablet by mouth daily TAKE 1 TABLET BY MOUTH ONCE DAILY 3/9/20  Yes JOLANTA Lugo CNP   simvastatin (ZOCOR) 20 MG tablet Take 1 tablet by mouth nightly 3/9/20  Yes JOLANTA Lugo CNP        Allergies:  Patient has no known allergies. Review of Systems:   Review of Systems     Physical Examination:    Vitals:    11/18/20 1309 11/18/20 1313   BP: (!) 160/80 130/78   Pulse: 76    SpO2: 98%    Weight: 203 lb (92.1 kg)    Height: 5' 11\" (1.803 m)         Constitutional and General Appearance: Warm and dry, no apparent distress, normal coloration  HEENT:  Normocephalic, atraumatic  Respiratory:  · Normal excursion and expansion without use of accessory muscles  · Resp Auscultation: Clear to auscultation   Cardiovascular:  · The apical impulses not displaced  · Heart tones are crisp and normal  · JVP 8 cm H2O  · Regular rate and rhythm, normal S1S2, no m/g/r  · Peripheral pulses are symmetrical and full  · There is no clubbing, cyanosis of the extremities.   · No BLE edema  · Pedal Pulses: 2+ and equal   Abdomen:  · No masses or tenderness  · Liver/Spleen: No Abnormalities Noted  Neurological/Psychiatric:  · Alert and oriented in all spheres  · Moves all extremities well  · Exhibits normal gait balance and coordination  · No abnormalities of mood, affect, memory, mentation, or behavior are noted    Lab Data:  Most recent lab results below reviewed in office    CBC:   Lab Results   Component Value Date    WBC 7.7 08/11/2020    WBC 17.0 02/21/2018    WBC 14.2 02/19/2018    RBC 5.16 08/11/2020    RBC 4.82 02/21/2018    RBC 5.48 02/19/2018    HGB 15.0 08/11/2020    HGB 13.6 02/21/2018    HGB 15.5 02/19/2018    HCT 45.2 08/11/2020    HCT 40.6 02/21/2018    HCT 46.2 02/19/2018    MCV 87.5 08/11/2020    MCV 84.1 02/21/2018    MCV 84.3 02/19/2018    RDW 13.4 08/11/2020    RDW 13.8 02/21/2018    RDW 13.4 02/19/2018     08/11/2020     02/21/2018     02/19/2018     BMP:  Lab Results   Component Value Date     11/05/2020     10/08/2020     08/13/2020    K 4.1 11/05/2020    K 4.6 10/08/2020    K 4.7 08/13/2020    K 4.1 08/11/2020    K 4.5 07/08/2020    K 4.1 02/21/2018     11/05/2020     10/08/2020     08/13/2020    CO2 22 11/05/2020    CO2 25 10/08/2020    CO2 25 08/13/2020    PHOS 3.1 11/05/2020    PHOS 3.4 10/08/2020    PHOS 3.5 07/08/2020    BUN 26 11/05/2020    BUN 24 10/08/2020    BUN 22 08/13/2020    CREATININE 1.5 11/05/2020    CREATININE 2.2 10/08/2020    CREATININE 1.4 08/13/2020     BNP: No results found for: PROBNP  LIPID:   Lab Results   Component Value Date    TRIG 112 02/20/2020    TRIG 194 02/27/2018    HDL 41 02/20/2020    HDL 33 02/27/2018    LDLCALC 67 02/20/2020    LDLCALC 51 02/27/2018       Cardiac Imaging:    Date of Procedure: 8/13/2020   Pre-Op Diagnosis: SYMPTOMATIC CHOLELITHIASIS   Post-Op Diagnosis: Same, umbilical hernia     Procedure(s):  LAPAROSCOPIC CHOLECYSTECTOMY WITH INTRAOPERATIVE CHOLANGIOGRAM; Umbilical hernia repair    Findings: 1) mildly inflamed GB w/ small stones                  2) IOC - no filling defects, (+) flow into duod 3) ~0.4LY umbilical hernia, closed primarily  Surgeon(s):  Yvon Godoy MD      CT ABD/ PELVIS 8/11/20:   1. Acute cholecystitis. 2. New subocclusive superior mesenteric vein thrombus. ECHO 9/25/20:    Summary   Normal left ventricle size, wall thickness, and systolic function with a visually estimated ejection fraction of 55%. No regional wall motion abnormalities are seen. Normal left ventricular diastolic filling pressure. No significant valvular disease. Assessment:    1. Essential hypertension    2. Mixed hyperlipidemia    3. Mesenteric vein thrombosis (HCC)    4. Stage 3a chronic kidney disease          Plan:   1. Stop the lisinopril  2. Increase the amlodipine (Norvasc) to 5 mg twice daily  3. No change in the fenofibrate or simvastatin  4. Continue the Xarelto (per Dr. Samantha Quinonez), okay to hold the aspirin while on Xarelto  5. Follow up with me in 2 months     I appreciate the opportunity of cooperating in the care of this individual.    JOLANTA Nicholson - CNP, 11/18/2020, 4:41 PM       QUALITY MEASURES  1. Tobacco Cessation Counseling: NA  2. Retake of BP if >140/90:   NA  3. Documentation to PCP/referring for new patient:  Sent to PCP at close of office visit  4. CAD patient on anti-platelet: NA  5. CAD patient on STATIN therapy:  NA  6.  Patient with CHF and aFib on anticoagulation:  NA

## 2020-11-18 NOTE — LETTER
[address]   Cardiac Evaluation    Primary Care Doctor:  [facility] DOLORES     Chief Complaint   Patient presents with    Follow-up     no cardiac complaints         History of Present Illness:   I had the pleasure of seeing Hyacinth Magaña in follow up for  HTN, HLD as well as CKD3 and hx of mesenteric vein thrombosis. In September his blood pressure was elevated so the amlodipine was increased to 2.5 mg twice daily. An echocardiogram was completed due to concern of murmur. This revealed normal LV function and no significant valvular abnormalities. His BP was up initially but improved on recheck today. His BP at home runs 130s/70s. He has cut out sugar in his diet but feels is eating more of the healthy food. His weight is stable. He is followed up with his nephrologist who feels the lisinopril is contributing to his renal dysfunction and has cut this dose back. He is now on 2.5 mg lisinopril. He is tolerating the increased dose of amlodipine to 2.5 mg twice daily. He was seen in the emergency room for cholecystitis. CT of the abdomen pelvis showed new subocclusive superior mesenteric vein thrombosis. He was started back on Xarelto. He did undergo cholecystectomy as well as umbilical hernia repair during that hospitalization. He follows with Dr. Bailey Miller for further evaluation of clotting disorder having undergone a thorough work-up in the past.  All of this has so far been negative. Hyacinth Magaña describes symptoms including none but denies chest pain, dyspnea, palpitations, orthopnea, PND, fatigue, early saiety, edema, syncope. NYHA:   I  ACC/ AHA Stage:    B    Past Medical History:   has a past medical history of Hx of blood clots, Hyperlipidemia, Hypertension, PONV (postoperative nausea and vomiting), Symptomatic cholelithiasis, and Thrombosis. Surgical History:   has a past surgical history that includes Ankle surgery;  Wrist surgery; Tonsillectomy; shoulder surgery (Right, 2012); Colonoscopy (03/08/2017); and Cholecystectomy, laparoscopic (N/A, 8/13/2020). Social History:   reports that he has never smoked. He has never used smokeless tobacco. He reports current alcohol use. He reports that he does not use drugs. Family History:   Family History   Problem Relation Age of Onset    Breast Cancer Mother     Coronary Art Dis Father     Diabetes Sister        Home Medications:  Prior to Admission medications    Medication Sig Start Date End Date Taking? Authorizing Provider   amLODIPine (NORVASC) 5 MG tablet Take 1 tablet by mouth 2 times daily 11/18/20  Yes [facility] JOLANTA Gutierres CNP   rivaroxaban (XARELTO) 20 MG TABS tablet Take 10 mg by mouth daily (with breakfast)    Yes Historical Provider, MD   fenofibrate (TRICOR) 145 MG tablet Take 1 tablet by mouth daily TAKE 1 TABLET BY MOUTH ONCE DAILY 3/9/20  Yes JOLANTA Mar CNP   simvastatin (ZOCOR) 20 MG tablet Take 1 tablet by mouth nightly 3/9/20  Yes JOLANTA Mar CNP        Allergies:  Patient has no known allergies. Review of Systems:   Review of Systems     Physical Examination:    Vitals:    11/18/20 1309 11/18/20 1313   BP: (!) 160/80 130/78   Pulse: 76    SpO2: 98%    Weight: 203 lb (92.1 kg)    Height: 5' 11\" (1.803 m)         Constitutional and General Appearance: Warm and dry, no apparent distress, normal coloration  HEENT:  Normocephalic, atraumatic  Respiratory:  · Normal excursion and expansion without use of accessory muscles  · Resp Auscultation: Clear to auscultation   Cardiovascular:  · The apical impulses not displaced  · Heart tones are crisp and normal  · JVP 8 cm H2O  · Regular rate and rhythm, normal S1S2, no m/g/r  · Peripheral pulses are symmetrical and full  · There is no clubbing, cyanosis of the extremities.   · No BLE edema  · Pedal Pulses: 2+ and equal   Abdomen:  · No masses or tenderness  · Liver/Spleen: No Abnormalities Noted Neurological/Psychiatric:  · Alert and oriented in all spheres  · Moves all extremities well  · Exhibits normal gait balance and coordination  · No abnormalities of mood, affect, memory, mentation, or behavior are noted    Lab Data:  Most recent lab results below reviewed in office    CBC:   Lab Results   Component Value Date    WBC 7.7 08/11/2020    WBC 17.0 02/21/2018    WBC 14.2 02/19/2018    RBC 5.16 08/11/2020    RBC 4.82 02/21/2018    RBC 5.48 02/19/2018    HGB 15.0 08/11/2020    HGB 13.6 02/21/2018    HGB 15.5 02/19/2018    HCT 45.2 08/11/2020    HCT 40.6 02/21/2018    HCT 46.2 02/19/2018    MCV 87.5 08/11/2020    MCV 84.1 02/21/2018    MCV 84.3 02/19/2018    RDW 13.4 08/11/2020    RDW 13.8 02/21/2018    RDW 13.4 02/19/2018     08/11/2020     02/21/2018     02/19/2018     BMP:  Lab Results   Component Value Date     11/05/2020     10/08/2020     08/13/2020    K 4.1 11/05/2020    K 4.6 10/08/2020    K 4.7 08/13/2020    K 4.1 08/11/2020    K 4.5 07/08/2020    K 4.1 02/21/2018     11/05/2020     10/08/2020     08/13/2020    CO2 22 11/05/2020    CO2 25 10/08/2020    CO2 25 08/13/2020    PHOS 3.1 11/05/2020    PHOS 3.4 10/08/2020    PHOS 3.5 07/08/2020    BUN 26 11/05/2020    BUN 24 10/08/2020    BUN 22 08/13/2020    CREATININE 1.5 11/05/2020    CREATININE 2.2 10/08/2020    CREATININE 1.4 08/13/2020     BNP: No results found for: PROBNP  LIPID:   Lab Results   Component Value Date    TRIG 112 02/20/2020    TRIG 194 02/27/2018    HDL 41 02/20/2020    HDL 33 02/27/2018    LDLCALC 67 02/20/2020    LDLCALC 51 02/27/2018       Cardiac Imaging:    Date of Procedure: 8/13/2020   Pre-Op Diagnosis: SYMPTOMATIC CHOLELITHIASIS   Post-Op Diagnosis: Same, umbilical hernia     Procedure(s):  LAPAROSCOPIC CHOLECYSTECTOMY WITH INTRAOPERATIVE CHOLANGIOGRAM; Umbilical hernia repair    Findings: 1) mildly inflamed GB w/ small stones 2) IOC - no filling defects, (+) flow into duod                  3) ~8.7PJ umbilical hernia, closed primarily  Surgeon(s):  Chayo Broussard MD      CT ABD/ PELVIS 8/11/20:   1. Acute cholecystitis. 2. New subocclusive superior mesenteric vein thrombus. ECHO 9/25/20:    Summary   Normal left ventricle size, wall thickness, and systolic function with a visually estimated ejection fraction of 55%. No regional wall motion abnormalities are seen. Normal left ventricular diastolic filling pressure. No significant valvular disease. Assessment:    1. Essential hypertension    2. Mixed hyperlipidemia    3. Mesenteric vein thrombosis (HCC)    4. Stage 3a chronic kidney disease          Plan:   1. Stop the lisinopril  2. Increase the amlodipine (Norvasc) to 5 mg twice daily  3. No change in the fenofibrate or simvastatin  4. Continue the Xarelto (per Dr. Naina Núñez), okay to hold the aspirin while on Xarelto  5. Follow up with me in 2 months     I appreciate the opportunity of cooperating in the care of this individual.    JOLANTA Witt - CNP, 11/18/2020, 4:41 PM       QUALITY MEASURES  1. Tobacco Cessation Counseling: NA  2. Retake of BP if >140/90:   NA  3. Documentation to PCP/referring for new patient:  Sent to PCP at close of office visit  4. CAD patient on anti-platelet: NA  5. CAD patient on STATIN therapy:  NA 6.  Patient with CHF and aFib on anticoagulation:  NA

## 2021-01-26 ENCOUNTER — OFFICE VISIT (OUTPATIENT)
Dept: CARDIOLOGY CLINIC | Age: 66
End: 2021-01-26
Payer: MEDICARE

## 2021-01-26 VITALS
WEIGHT: 201 LBS | HEART RATE: 71 BPM | DIASTOLIC BLOOD PRESSURE: 80 MMHG | BODY MASS INDEX: 28.14 KG/M2 | OXYGEN SATURATION: 99 % | HEIGHT: 71 IN | SYSTOLIC BLOOD PRESSURE: 130 MMHG

## 2021-01-26 DIAGNOSIS — E78.2 MIXED HYPERLIPIDEMIA: ICD-10-CM

## 2021-01-26 DIAGNOSIS — N18.31 STAGE 3A CHRONIC KIDNEY DISEASE (HCC): ICD-10-CM

## 2021-01-26 DIAGNOSIS — K55.069 MESENTERIC VEIN THROMBOSIS (HCC): ICD-10-CM

## 2021-01-26 DIAGNOSIS — I10 ESSENTIAL HYPERTENSION: Primary | ICD-10-CM

## 2021-01-26 PROCEDURE — G8484 FLU IMMUNIZE NO ADMIN: HCPCS | Performed by: NURSE PRACTITIONER

## 2021-01-26 PROCEDURE — 4040F PNEUMOC VAC/ADMIN/RCVD: CPT | Performed by: NURSE PRACTITIONER

## 2021-01-26 PROCEDURE — G8427 DOCREV CUR MEDS BY ELIG CLIN: HCPCS | Performed by: NURSE PRACTITIONER

## 2021-01-26 PROCEDURE — 1123F ACP DISCUSS/DSCN MKR DOCD: CPT | Performed by: NURSE PRACTITIONER

## 2021-01-26 PROCEDURE — 99214 OFFICE O/P EST MOD 30 MIN: CPT | Performed by: NURSE PRACTITIONER

## 2021-01-26 PROCEDURE — 1036F TOBACCO NON-USER: CPT | Performed by: NURSE PRACTITIONER

## 2021-01-26 PROCEDURE — G8417 CALC BMI ABV UP PARAM F/U: HCPCS | Performed by: NURSE PRACTITIONER

## 2021-01-26 PROCEDURE — 3017F COLORECTAL CA SCREEN DOC REV: CPT | Performed by: NURSE PRACTITIONER

## 2021-01-26 RX ORDER — FENOFIBRATE 145 MG/1
145 TABLET, COATED ORAL DAILY
Qty: 90 TABLET | Refills: 3 | Status: SHIPPED | OUTPATIENT
Start: 2021-01-26 | End: 2022-03-07

## 2021-01-26 RX ORDER — SIMVASTATIN 20 MG
20 TABLET ORAL NIGHTLY
Qty: 90 TABLET | Refills: 3 | Status: SHIPPED | OUTPATIENT
Start: 2021-01-26 | End: 2022-03-21

## 2021-01-26 RX ORDER — AMLODIPINE BESYLATE 5 MG/1
5 TABLET ORAL 2 TIMES DAILY
Qty: 180 TABLET | Refills: 3 | Status: ON HOLD | OUTPATIENT
Start: 2021-01-26 | End: 2021-03-04 | Stop reason: SDUPTHER

## 2021-01-26 NOTE — LETTER
[address]   Cardiac Evaluation    Primary Care Doctor:  [facility] DO DOLORES    Chief Complaint   Patient presents with    Follow-up    Hypertension        History of Present Illness:   I had the pleasure of seeing Marquise Nolan in follow up for HTN, HLD as well as CKD3 and hx of mesenteric vein thrombosis. At last visit the lisinopril was stopped completely (renal function) and the amlodipine ws increased to 5 mg bid. BP at home a bit higher off the lisinopril, 130/80's. Still follow with Dr. Richelle Quinones. Plan is to stay on Xarelto for at least a year or longer as tolerates. No bleeding problems. To see nephrology in March with repeat blood work. Still with some abdominal pain, s/p colonoscopy Jenni Melena) without bleeding or polyps. Did blood work and no new findings. The abdominal pain is sharp, can pinpoint to LLQ, worse with palpation. No chest pain, lightheadedness , dizziness, shortness of breath. No nausea or vomiting. Appetite good. Marquise Nolan describes symptoms including fatigue but denies chest pain, dyspnea, palpitations, orthopnea, PND, early saiety, edema, syncope. NYHA:   II  ACC/ AHA Stage:    B    Past Medical History:   has a past medical history of Hx of blood clots, Hyperlipidemia, Hypertension, PONV (postoperative nausea and vomiting), Symptomatic cholelithiasis, and Thrombosis. Surgical History:   has a past surgical history that includes Ankle surgery; Wrist surgery; Tonsillectomy; shoulder surgery (Right, 2012); Colonoscopy (03/08/2017); and Cholecystectomy, laparoscopic (N/A, 8/13/2020). Social History:   reports that he has never smoked. He has never used smokeless tobacco. He reports current alcohol use. He reports that he does not use drugs.    Family History:   Family History   Problem Relation Age of Onset    Breast Cancer Mother     Coronary Art Dis Father     Diabetes Sister        Home Medications:  Prior to Admission medications Medication Sig Start Date End Date Taking? Authorizing Provider   amLODIPine (NORVASC) 5 MG tablet Take 1 tablet by mouth 2 times daily 11/18/20  Yes [facility] JOLANTA Longo CNP   rivaroxaban (XARELTO) 20 MG TABS tablet Take 10 mg by mouth daily (with breakfast)    Yes Historical Provider, MD   fenofibrate (TRICOR) 145 MG tablet Take 1 tablet by mouth daily TAKE 1 TABLET BY MOUTH ONCE DAILY 3/9/20  Yes JOLANTA Zuluaga CNP   simvastatin (ZOCOR) 20 MG tablet Take 1 tablet by mouth nightly 3/9/20  Yes JOLANTA Zuluaga CNP        Allergies:  Patient has no known allergies. Physical Examination:    Vitals:    01/26/21 1140   BP: 130/80   Pulse: 71   SpO2: 99%   Weight: 201 lb (91.2 kg)   Height: 5' 11\" (1.803 m)        Constitutional and General Appearance: Warm and dry, no apparent distress, normal coloration  HEENT:  Normocephalic, atraumatic  Respiratory:  · Normal excursion and expansion without use of accessory muscles  · Resp Auscultation: Clear to auscultation   Cardiovascular:  · The apical impulses not displaced  · Heart tones are crisp and normal  · JVP 8 cm H2O  · Regular rate and rhythm, normal S1S2, no m/g/r  · Peripheral pulses are symmetrical and full  · There is no clubbing, cyanosis of the extremities.   · No BLE edema  · Pedal Pulses: 2+ and equal   Abdomen:  · No masses, + tenderness to palpation LLQ, + normoactive bowel sounds  · Liver/Spleen: No Abnormalities Noted  Neurological/Psychiatric:  · Alert and oriented in all spheres  · Moves all extremities well  · Exhibits normal gait balance and coordination  · No abnormalities of mood, affect, memory, mentation, or behavior are noted    Lab Data:  Most recent lab results below reviewed in office    CBC:   Lab Results   Component Value Date    WBC 7.7 08/11/2020    WBC 17.0 02/21/2018    WBC 14.2 02/19/2018    RBC 5.16 08/11/2020    RBC 4.82 02/21/2018    RBC 5.48 02/19/2018    HGB 15.0 08/11/2020    HGB 13.6 02/21/2018 HGB 15.5 02/19/2018    HCT 45.2 08/11/2020    HCT 40.6 02/21/2018    HCT 46.2 02/19/2018    MCV 87.5 08/11/2020    MCV 84.1 02/21/2018    MCV 84.3 02/19/2018    RDW 13.4 08/11/2020    RDW 13.8 02/21/2018    RDW 13.4 02/19/2018     08/11/2020     02/21/2018     02/19/2018     BMP:  Lab Results   Component Value Date     11/05/2020     10/08/2020     08/13/2020    K 4.1 11/05/2020    K 4.6 10/08/2020    K 4.7 08/13/2020    K 4.1 08/11/2020    K 4.5 07/08/2020    K 4.1 02/21/2018     11/05/2020     10/08/2020     08/13/2020    CO2 22 11/05/2020    CO2 25 10/08/2020    CO2 25 08/13/2020    PHOS 3.1 11/05/2020    PHOS 3.4 10/08/2020    PHOS 3.5 07/08/2020    BUN 26 11/05/2020    BUN 24 10/08/2020    BUN 22 08/13/2020    CREATININE 1.5 11/05/2020    CREATININE 2.2 10/08/2020    CREATININE 1.4 08/13/2020     BNP: No results found for: PROBNP  LIPID:   Lab Results   Component Value Date    TRIG 112 02/20/2020    TRIG 194 02/27/2018    HDL 41 02/20/2020    HDL 33 02/27/2018    LDLCALC 67 02/20/2020    LDLCALC 51 02/27/2018       Cardiac Imaging:     ECHO 9/25/20:    Summary   Normal left ventricle size, wall thickness, and systolic function with a visually estimated ejection fraction of 55%. No regional wall motion abnormalities are seen. Normal left ventricular diastolic filling pressure. No significant valvular disease. Assessment:    1. Essential hypertension    2. Mixed hyperlipidemia    3. Stage 3a chronic kidney disease    4. Mesenteric vein thrombosis ([address]          Plan:   1. No change in medicines for BP  2. Will be due for cholesterol panel in February - have done when have blood work for Dr. William Fernandes  3.  Follow up with Dr. Edwardo Barefoot in 6 months      I appreciate the opportunity of cooperating in the care of this individual.    Elijah Styles, JOLANTA - CNP, 1/26/2021, 11:48 AM

## 2021-01-26 NOTE — PROGRESS NOTES
[facility], [place], operated by [facility]   Cardiac Evaluation    Primary Care Doctor:  Valarie BERNAL DO    Chief Complaint   Patient presents with    Follow-up    Hypertension        History of Present Illness:   I had the pleasure of seeing Rimma Vasquez in follow up for HTN, HLD as well as CKD3 and hx of mesenteric vein thrombosis. At last visit the lisinopril was stopped completely (renal function) and the amlodipine ws increased to 5 mg bid. BP at home a bit higher off the lisinopril, 130/80's. Still follow with Dr. Dain Conner. Plan is to stay on Xarelto for at least a year or longer as tolerates. No bleeding problems. To see nephrology in March with repeat blood work. Still with some abdominal pain, s/p colonoscopy Krystal Hernández) without bleeding or polyps. Did blood work and no new findings. The abdominal pain is sharp, can pinpoint to LLQ, worse with palpation. No chest pain, lightheadedness , dizziness, shortness of breath. No nausea or vomiting. Appetite good. Rimma Vasquez describes symptoms including fatigue but denies chest pain, dyspnea, palpitations, orthopnea, PND, early saiety, edema, syncope. NYHA:   II  ACC/ AHA Stage:    B    Past Medical History:   has a past medical history of Hx of blood clots, Hyperlipidemia, Hypertension, PONV (postoperative nausea and vomiting), Symptomatic cholelithiasis, and Thrombosis. Surgical History:   has a past surgical history that includes Ankle surgery; Wrist surgery; Tonsillectomy; shoulder surgery (Right, 2012); Colonoscopy (03/08/2017); and Cholecystectomy, laparoscopic (N/A, 8/13/2020). Social History:   reports that he has never smoked. He has never used smokeless tobacco. He reports current alcohol use. He reports that he does not use drugs.    Family History:   Family History   Problem Relation Age of Onset    Breast Cancer Mother     Coronary Art Dis Father     Diabetes Sister        Home Medications:  Prior to Admission medications Medication Sig Start Date End Date Taking? Authorizing Provider   amLODIPine (NORVASC) 5 MG tablet Take 1 tablet by mouth 2 times daily 11/18/20  Yes Malen Anderson JOLANTA Milan CNP   rivaroxaban (XARELTO) 20 MG TABS tablet Take 10 mg by mouth daily (with breakfast)    Yes Historical Provider, MD   fenofibrate (TRICOR) 145 MG tablet Take 1 tablet by mouth daily TAKE 1 TABLET BY MOUTH ONCE DAILY 3/9/20  Yes JOLANTA Ayoub CNP   simvastatin (ZOCOR) 20 MG tablet Take 1 tablet by mouth nightly 3/9/20  Yes JOLANTA Ayoub CNP        Allergies:  Patient has no known allergies. Physical Examination:    Vitals:    01/26/21 1140   BP: 130/80   Pulse: 71   SpO2: 99%   Weight: 201 lb (91.2 kg)   Height: 5' 11\" (1.803 m)        Constitutional and General Appearance: Warm and dry, no apparent distress, normal coloration  HEENT:  Normocephalic, atraumatic  Respiratory:  · Normal excursion and expansion without use of accessory muscles  · Resp Auscultation: Clear to auscultation   Cardiovascular:  · The apical impulses not displaced  · Heart tones are crisp and normal  · JVP 8 cm H2O  · Regular rate and rhythm, normal S1S2, no m/g/r  · Peripheral pulses are symmetrical and full  · There is no clubbing, cyanosis of the extremities.   · No BLE edema  · Pedal Pulses: 2+ and equal   Abdomen:  · No masses, + tenderness to palpation LLQ, + normoactive bowel sounds  · Liver/Spleen: No Abnormalities Noted  Neurological/Psychiatric:  · Alert and oriented in all spheres  · Moves all extremities well  · Exhibits normal gait balance and coordination  · No abnormalities of mood, affect, memory, mentation, or behavior are noted    Lab Data:  Most recent lab results below reviewed in office    CBC:   Lab Results   Component Value Date    WBC 7.7 08/11/2020    WBC 17.0 02/21/2018    WBC 14.2 02/19/2018    RBC 5.16 08/11/2020    RBC 4.82 02/21/2018    RBC 5.48 02/19/2018    HGB 15.0 08/11/2020    HGB 13.6 02/21/2018 HGB 15.5 02/19/2018    HCT 45.2 08/11/2020    HCT 40.6 02/21/2018    HCT 46.2 02/19/2018    MCV 87.5 08/11/2020    MCV 84.1 02/21/2018    MCV 84.3 02/19/2018    RDW 13.4 08/11/2020    RDW 13.8 02/21/2018    RDW 13.4 02/19/2018     08/11/2020     02/21/2018     02/19/2018     BMP:  Lab Results   Component Value Date     11/05/2020     10/08/2020     08/13/2020    K 4.1 11/05/2020    K 4.6 10/08/2020    K 4.7 08/13/2020    K 4.1 08/11/2020    K 4.5 07/08/2020    K 4.1 02/21/2018     11/05/2020     10/08/2020     08/13/2020    CO2 22 11/05/2020    CO2 25 10/08/2020    CO2 25 08/13/2020    PHOS 3.1 11/05/2020    PHOS 3.4 10/08/2020    PHOS 3.5 07/08/2020    BUN 26 11/05/2020    BUN 24 10/08/2020    BUN 22 08/13/2020    CREATININE 1.5 11/05/2020    CREATININE 2.2 10/08/2020    CREATININE 1.4 08/13/2020     BNP: No results found for: PROBNP  LIPID:   Lab Results   Component Value Date    TRIG 112 02/20/2020    TRIG 194 02/27/2018    HDL 41 02/20/2020    HDL 33 02/27/2018    LDLCALC 67 02/20/2020    LDLCALC 51 02/27/2018       Cardiac Imaging:     ECHO 9/25/20:    Summary   Normal left ventricle size, wall thickness, and systolic function with a visually estimated ejection fraction of 55%. No regional wall motion abnormalities are seen. Normal left ventricular diastolic filling pressure. No significant valvular disease. Assessment:    1. Essential hypertension    2. Mixed hyperlipidemia    3. Stage 3a chronic kidney disease    4. Mesenteric vein thrombosis ([address]          Plan:   1. No change in medicines for BP  2. Will be due for cholesterol panel in February - have done when have blood work for Dr. Hudson Fagan  3.  Follow up with Dr. Mandy Dos Santos in 6 months      I appreciate the opportunity of cooperating in the care of this individual.    Keila Castro, APRN - CNP, 1/26/2021, 11:48 AM

## 2021-01-29 ENCOUNTER — TELEPHONE (OUTPATIENT)
Dept: FAMILY MEDICINE CLINIC | Age: 66
End: 2021-01-29

## 2021-01-29 NOTE — TELEPHONE ENCOUNTER
Left message to advise patient that he is overdue for an appointment. Per  has not been seen by her since 2018 and needs to be seen yearly to maintain active status. Please schedule for yearly AWV.

## 2021-02-06 ENCOUNTER — HOSPITAL ENCOUNTER (OUTPATIENT)
Dept: CT IMAGING | Age: 66
Discharge: HOME | End: 2021-02-06
Payer: MEDICARE

## 2021-02-06 DIAGNOSIS — R10.32 ABDOMINAL PAIN, LEFT LOWER QUADRANT: ICD-10-CM

## 2021-02-06 PROCEDURE — 6360000004 HC RX CONTRAST MEDICATION: Performed by: INTERNAL MEDICINE

## 2021-02-06 PROCEDURE — 74176 CT ABD & PELVIS W/O CONTRAST: CPT

## 2021-02-06 RX ADMIN — IOHEXOL 50 ML: 240 INJECTION, SOLUTION INTRATHECAL; INTRAVASCULAR; INTRAVENOUS; ORAL at 09:48

## 2021-02-21 ENCOUNTER — HOSPITAL ENCOUNTER (EMERGENCY)
Age: 66
Discharge: HOME | DRG: 393 | End: 2021-02-21
Payer: MEDICARE

## 2021-02-21 ENCOUNTER — APPOINTMENT (OUTPATIENT)
Dept: CT IMAGING | Age: 66
DRG: 393 | End: 2021-02-21
Payer: MEDICARE

## 2021-02-21 VITALS
WEIGHT: 180 LBS | BODY MASS INDEX: 25.1 KG/M2 | TEMPERATURE: 98.5 F | RESPIRATION RATE: 16 BRPM | DIASTOLIC BLOOD PRESSURE: 86 MMHG | OXYGEN SATURATION: 97 % | SYSTOLIC BLOOD PRESSURE: 162 MMHG | HEART RATE: 61 BPM

## 2021-02-21 DIAGNOSIS — R11.2 NAUSEA AND VOMITING, INTRACTABILITY OF VOMITING NOT SPECIFIED, UNSPECIFIED VOMITING TYPE: ICD-10-CM

## 2021-02-21 DIAGNOSIS — R10.30 LOWER ABDOMINAL PAIN: ICD-10-CM

## 2021-02-21 DIAGNOSIS — K52.9 COLITIS: Primary | ICD-10-CM

## 2021-02-21 LAB
A/G RATIO: 1.4 (ref 1.1–2.2)
ALBUMIN SERPL-MCNC: 4.8 G/DL (ref 3.4–5)
ALP BLD-CCNC: 51 U/L (ref 40–129)
ALT SERPL-CCNC: 16 U/L (ref 10–40)
ANION GAP SERPL CALCULATED.3IONS-SCNC: 11 MMOL/L (ref 3–16)
AST SERPL-CCNC: 17 U/L (ref 15–37)
BASOPHILS ABSOLUTE: 0.1 K/UL (ref 0–0.2)
BASOPHILS RELATIVE PERCENT: 0.9 %
BILIRUB SERPL-MCNC: 1 MG/DL (ref 0–1)
BILIRUBIN URINE: NEGATIVE
BLOOD, URINE: NEGATIVE
BUN BLDV-MCNC: 25 MG/DL (ref 7–20)
CALCIUM SERPL-MCNC: 10 MG/DL (ref 8.3–10.6)
CHLORIDE BLD-SCNC: 96 MMOL/L (ref 99–110)
CLARITY: CLEAR
CO2: 28 MMOL/L (ref 21–32)
COLOR: YELLOW
CREAT SERPL-MCNC: 1.4 MG/DL (ref 0.8–1.3)
EOSINOPHILS ABSOLUTE: 0.2 K/UL (ref 0–0.6)
EOSINOPHILS RELATIVE PERCENT: 2 %
GFR AFRICAN AMERICAN: >60
GFR NON-AFRICAN AMERICAN: 51
GLOBULIN: 3.5 G/DL
GLUCOSE BLD-MCNC: 156 MG/DL (ref 70–99)
GLUCOSE URINE: NEGATIVE MG/DL
HCT VFR BLD CALC: 43.1 % (ref 40.5–52.5)
HEMOGLOBIN: 14.1 G/DL (ref 13.5–17.5)
INR BLD: 1.37 (ref 0.86–1.14)
KETONES, URINE: NEGATIVE MG/DL
LEUKOCYTE ESTERASE, URINE: NEGATIVE
LIPASE: 16 U/L (ref 13–60)
LYMPHOCYTES ABSOLUTE: 1 K/UL (ref 1–5.1)
LYMPHOCYTES RELATIVE PERCENT: 9.2 %
MCH RBC QN AUTO: 26.9 PG (ref 26–34)
MCHC RBC AUTO-ENTMCNC: 32.7 G/DL (ref 31–36)
MCV RBC AUTO: 82.3 FL (ref 80–100)
MICROSCOPIC EXAMINATION: NORMAL
MONOCYTES ABSOLUTE: 0.8 K/UL (ref 0–1.3)
MONOCYTES RELATIVE PERCENT: 8.1 %
NEUTROPHILS ABSOLUTE: 8.3 K/UL (ref 1.7–7.7)
NEUTROPHILS RELATIVE PERCENT: 79.8 %
NITRITE, URINE: NEGATIVE
PDW BLD-RTO: 14 % (ref 12.4–15.4)
PH UA: 6 (ref 5–8)
PLATELET # BLD: 247 K/UL (ref 135–450)
PMV BLD AUTO: 8.1 FL (ref 5–10.5)
POTASSIUM SERPL-SCNC: 4.3 MMOL/L (ref 3.5–5.1)
PROTEIN UA: NEGATIVE MG/DL
PROTHROMBIN TIME: 16 SEC (ref 10–13.2)
RBC # BLD: 5.23 M/UL (ref 4.2–5.9)
SODIUM BLD-SCNC: 135 MMOL/L (ref 136–145)
SPECIFIC GRAVITY UA: 1.02 (ref 1–1.03)
TOTAL PROTEIN: 8.3 G/DL (ref 6.4–8.2)
URINE TYPE: NORMAL
UROBILINOGEN, URINE: 0.2 E.U./DL
WBC # BLD: 10.4 K/UL (ref 4–11)

## 2021-02-21 PROCEDURE — 99284 EMERGENCY DEPT VISIT MOD MDM: CPT

## 2021-02-21 PROCEDURE — 81003 URINALYSIS AUTO W/O SCOPE: CPT

## 2021-02-21 PROCEDURE — 6360000002 HC RX W HCPCS: Performed by: NURSE PRACTITIONER

## 2021-02-21 PROCEDURE — 80053 COMPREHEN METABOLIC PANEL: CPT

## 2021-02-21 PROCEDURE — 83690 ASSAY OF LIPASE: CPT

## 2021-02-21 PROCEDURE — 85610 PROTHROMBIN TIME: CPT

## 2021-02-21 PROCEDURE — 6370000000 HC RX 637 (ALT 250 FOR IP): Performed by: NURSE PRACTITIONER

## 2021-02-21 PROCEDURE — 85025 COMPLETE CBC W/AUTO DIFF WBC: CPT

## 2021-02-21 PROCEDURE — 36415 COLL VENOUS BLD VENIPUNCTURE: CPT

## 2021-02-21 PROCEDURE — 2580000003 HC RX 258: Performed by: NURSE PRACTITIONER

## 2021-02-21 PROCEDURE — 96374 THER/PROPH/DIAG INJ IV PUSH: CPT

## 2021-02-21 PROCEDURE — 74176 CT ABD & PELVIS W/O CONTRAST: CPT

## 2021-02-21 RX ORDER — ONDANSETRON 4 MG/1
4 TABLET, ORALLY DISINTEGRATING ORAL EVERY 8 HOURS PRN
Qty: 20 TABLET | Refills: 0 | Status: SHIPPED | OUTPATIENT
Start: 2021-02-21 | End: 2021-04-12

## 2021-02-21 RX ORDER — ONDANSETRON 2 MG/ML
4 INJECTION INTRAMUSCULAR; INTRAVENOUS EVERY 30 MIN PRN
Status: DISCONTINUED | OUTPATIENT
Start: 2021-02-21 | End: 2021-02-21 | Stop reason: HOSPADM

## 2021-02-21 RX ORDER — HYDROCODONE BITARTRATE AND ACETAMINOPHEN 5; 325 MG/1; MG/1
1 TABLET ORAL ONCE
Status: DISCONTINUED | OUTPATIENT
Start: 2021-02-21 | End: 2021-02-21

## 2021-02-21 RX ORDER — METRONIDAZOLE 500 MG/1
500 TABLET ORAL 3 TIMES DAILY
Qty: 42 TABLET | Refills: 0 | Status: ON HOLD | OUTPATIENT
Start: 2021-02-21 | End: 2021-03-04 | Stop reason: HOSPADM

## 2021-02-21 RX ORDER — 0.9 % SODIUM CHLORIDE 0.9 %
1000 INTRAVENOUS SOLUTION INTRAVENOUS ONCE
Status: COMPLETED | OUTPATIENT
Start: 2021-02-21 | End: 2021-02-21

## 2021-02-21 RX ORDER — BUDESONIDE 3 MG/1
CAPSULE, COATED PELLETS ORAL
Status: ON HOLD | COMMUNITY
Start: 2021-02-15 | End: 2021-02-24 | Stop reason: ALTCHOICE

## 2021-02-21 RX ORDER — CIPROFLOXACIN 500 MG/1
500 TABLET, FILM COATED ORAL ONCE
Status: COMPLETED | OUTPATIENT
Start: 2021-02-21 | End: 2021-02-21

## 2021-02-21 RX ORDER — METRONIDAZOLE 250 MG/1
500 TABLET ORAL ONCE
Status: COMPLETED | OUTPATIENT
Start: 2021-02-21 | End: 2021-02-21

## 2021-02-21 RX ORDER — HYDROCODONE BITARTRATE AND ACETAMINOPHEN 5; 325 MG/1; MG/1
1 TABLET ORAL EVERY 6 HOURS PRN
Qty: 20 TABLET | Refills: 0 | Status: ON HOLD | OUTPATIENT
Start: 2021-02-21 | End: 2021-03-04

## 2021-02-21 RX ORDER — CIPROFLOXACIN 500 MG/1
500 TABLET, FILM COATED ORAL 2 TIMES DAILY
Qty: 28 TABLET | Refills: 0 | Status: ON HOLD | OUTPATIENT
Start: 2021-02-21 | End: 2021-03-04 | Stop reason: HOSPADM

## 2021-02-21 RX ORDER — MORPHINE SULFATE 4 MG/ML
4 INJECTION, SOLUTION INTRAMUSCULAR; INTRAVENOUS EVERY 30 MIN PRN
Status: DISCONTINUED | OUTPATIENT
Start: 2021-02-21 | End: 2021-02-21

## 2021-02-21 RX ORDER — DICYCLOMINE HYDROCHLORIDE 10 MG/1
CAPSULE ORAL
Status: ON HOLD | COMMUNITY
Start: 2021-02-08 | End: 2021-02-24 | Stop reason: ALTCHOICE

## 2021-02-21 RX ADMIN — SODIUM CHLORIDE 1000 ML: 9 INJECTION, SOLUTION INTRAVENOUS at 11:45

## 2021-02-21 RX ADMIN — CIPROFLOXACIN 500 MG: 500 TABLET, FILM COATED ORAL at 14:55

## 2021-02-21 RX ADMIN — ONDANSETRON 4 MG: 2 INJECTION INTRAMUSCULAR; INTRAVENOUS at 11:45

## 2021-02-21 RX ADMIN — METRONIDAZOLE 500 MG: 250 TABLET ORAL at 14:55

## 2021-02-21 ASSESSMENT — PAIN SCALES - GENERAL: PAINLEVEL_OUTOF10: 6

## 2021-02-21 NOTE — ED PROVIDER NOTES
Evaluated by Advanced Practice Provider    [facility]  ED    CHIEF COMPLAINT  Emesis (x 3 days, colonoscopy on monday)    HISTORY OF PRESENT ILLNESS  Leonardo Feldman is a 76 y.o. male who presents to the ED complaining of vomiting. He just had a colonoscopy with Dr. Kathryn Ornelas and was prescribed dicyclomine and budesonide after the colonoscopy. He has been throwing up for the past 3 days. States he had only drops of urine out last night, has known kidney disease so he came here. Reports a lot of abdominal pain. Reports his colon hurts and then goes across his appendix and bladder. Hurts so bad he can't sleep. Pain started 3 weeks ago. He had an EGD and then CT scan about 12 days ago. This past Monday he had the colonoscopy. He had a bunch of samples taken. He has the paperwork with him that shows there is concern for ischemic colitis vs. Ulcerative colitis. He was able to eat some after the test but then Thursday started vomiting and can't keep anything down. He is on Xarelto due to blood clot in abdomen 6 months ago. He sees Dr. Mark Spivey for hematology. Patient reports the pain he is having is what he has had for the past 3 weeks except it is worse. He had a CT with contrast 6 months ago and reports his kidney function worsened. Putting his knees up makes his abdominal pain better. He was given 2 different antibiotics due to elevated fever, 103. This was 2 weeks ago-cipro and flagyl. The patient is currently rating their pain as 6/10 and describes it as an aching type of pain. Treatments tried prior to arrival in the ED: none. The patient arrived to the ED via private car.     PAST MEDICAL HISTORY    Past Medical History:   Diagnosis Date    Hx of blood clots     Hyperlipidemia     Hypertension     PONV (postoperative nausea and vomiting)     Symptomatic cholelithiasis     Thrombosis        SURGICAL HISTORY    Past Surgical History:   Procedure Laterality Date  ANKLE SURGERY      left    CHOLECYSTECTOMY, LAPAROSCOPIC N/A 8/13/2020    LAPAROSCOPIC CHOLECYSTECTOMY WITH INTRAOPERATIVE CHOLANGIOGRAM performed by Sandra Gill MD at [facility]  03/08/2017    SHOULDER SURGERY Right 2012    TONSILLECTOMY      WRIST SURGERY      right       CURRENT MEDICATIONS    Current Outpatient Rx   Medication Sig Dispense Refill    ciprofloxacin (CIPRO) 500 MG tablet Take 1 tablet by mouth 2 times daily for 14 days 28 tablet 0    metroNIDAZOLE (FLAGYL) 500 MG tablet Take 1 tablet by mouth 3 times daily for 14 days 42 tablet 0    HYDROcodone-acetaminophen (NORCO) 5-325 MG per tablet Take 1 tablet by mouth every 6 hours as needed for Pain for up to 3 days.  20 tablet 0    ondansetron (ZOFRAN ODT) 4 MG disintegrating tablet Take 1 tablet by mouth every 8 hours as needed for Nausea 20 tablet 0    budesonide (ENTOCORT EC) 3 MG extended release capsule TAKE 3 BY MOUTH ONCE DAILY      dicyclomine (BENTYL) 10 MG capsule TAKE 1 CAPSULE BY MOUTH TWICE DAILY      simvastatin (ZOCOR) 20 MG tablet Take 1 tablet by mouth nightly 90 tablet 3    fenofibrate (TRICOR) 145 MG tablet Take 1 tablet by mouth daily TAKE 1 TABLET BY MOUTH ONCE DAILY 90 tablet 3    amLODIPine (NORVASC) 5 MG tablet Take 1 tablet by mouth 2 times daily 180 tablet 3    rivaroxaban (XARELTO) 20 MG TABS tablet Take 10 mg by mouth daily (with breakfast)        ALLERGIES    No Known Allergies    FAMILY HISTORY    Family History   Problem Relation Age of Onset    Breast Cancer Mother     Coronary Art Dis Father     Diabetes Sister        SOCIAL HISTORY    Social History     Socioeconomic History    Marital status:      Spouse name: None    Number of children: None    Years of education: None    Highest education level: None   Occupational History    None   Social Needs    Financial resource strain: None    Food insecurity     Worry: None     Inability: None    Transportation needs Medical: None     Non-medical: None   Tobacco Use    Smoking status: Never Smoker    Smokeless tobacco: Never Used   Substance and Sexual Activity    Alcohol use: Yes     Comment: 1-2 drinks per year    Drug use: No    Sexual activity: Yes   Lifestyle    Physical activity     Days per week: None     Minutes per session: None    Stress: None   Relationships    Social connections     Talks on phone: None     Gets together: None     Attends Evangelical service: None     Active member of club or organization: None     Attends meetings of clubs or organizations: None     Relationship status: None    Intimate partner violence     Fear of current or ex partner: None     Emotionally abused: None     Physically abused: None     Forced sexual activity: None   Other Topics Concern    None   Social History Narrative    None       REVIEW OFSYSTEMS    10systems reviewed, pertinent positives per HPI otherwise noted to be negative. PHYSICAL EXAM  Physical Exam  Vitals:    02/21/21 1347   BP: (!) 162/86   Pulse: 61   Resp: 16   Temp:    SpO2: 97%       GENERAL: Patient is well-developed, well-nourished. Awake andalert. Cooperative. Resting in bed. Moderately distressed due to his current level of pain. Not toxic in appearance. HEENT:  Normocephalic, atraumatic. Conjunctivaappear normal. Sclera is non-icteric. External ears are normal.    NECK: Supple with normal ROM. Tracheamidline  LUNGS: Equal and symmetric chest rise. Breathing is unlabored. Speaking comfortably in fullsentences. Lungs are clear bilaterally to auscultation. Without wheezing, rales, or rhonchi. CADIOVASCULAR:  Regular rate and rhythm. Normal S1-S2 sounds. No murmurs, rubs, or gallops. GI: Soft, generalized tenderness to palpation, nondistended with positive bowel sounds. No rebound tenderness, guarding or rigidity. Negative Rosales's sign. Negative Rovsing's sign. No CVAT to palpation. No masses or hepatosplenomegaly to palpation. MUSCULOSKELETAL:  No gross deformities or trauma noted. Moving all extremities equally and appropriately. Normal ROM. SKIN: Warm/dry. Skin is intact. No rashes or lesions noted. PSYCHIATRIC: Mood and affect appropriate. Speech is clear and articulate. NEUROLOGIC: Alert and oriented. No focal motor or sensory deficits.     LABS   Results for orders placed or performed during the hospital encounter of 02/21/21   CBC Auto Differential   Result Value Ref Range    WBC 10.4 4.0 - 11.0 K/uL    RBC 5.23 4.20 - 5.90 M/uL    Hemoglobin 14.1 13.5 - 17.5 g/dL    Hematocrit 43.1 40.5 - 52.5 %    MCV 82.3 80.0 - 100.0 fL    MCH 26.9 26.0 - 34.0 pg    MCHC 32.7 31.0 - 36.0 g/dL    RDW 14.0 12.4 - 15.4 %    Platelets 158 410 - 394 K/uL    MPV 8.1 5.0 - 10.5 fL    Neutrophils % 79.8 %    Lymphocytes % 9.2 %    Monocytes % 8.1 %    Eosinophils % 2.0 %    Basophils % 0.9 %    Neutrophils Absolute 8.3 (H) 1.7 - 7.7 K/uL    Lymphocytes Absolute 1.0 1.0 - 5.1 K/uL    Monocytes Absolute 0.8 0.0 - 1.3 K/uL    Eosinophils Absolute 0.2 0.0 - 0.6 K/uL    Basophils Absolute 0.1 0.0 - 0.2 K/uL   Comprehensive Metabolic Panel   Result Value Ref Range    Sodium 135 (L) 136 - 145 mmol/L    Potassium 4.3 3.5 - 5.1 mmol/L    Chloride 96 (L) 99 - 110 mmol/L    CO2 28 21 - 32 mmol/L    Anion Gap 11 3 - 16    Glucose 156 (H) 70 - 99 mg/dL    BUN 25 (H) 7 - 20 mg/dL    CREATININE 1.4 (H) 0.8 - 1.3 mg/dL    GFR Non- 51 (A) >60    GFR African American >60 >60    Calcium 10.0 8.3 - 10.6 mg/dL    Total Protein 8.3 (H) 6.4 - 8.2 g/dL    Albumin 4.8 3.4 - 5.0 g/dL    Albumin/Globulin Ratio 1.4 1.1 - 2.2    Total Bilirubin 1.0 0.0 - 1.0 mg/dL    Alkaline Phosphatase 51 40 - 129 U/L    ALT 16 10 - 40 U/L    AST 17 15 - 37 U/L    Globulin 3.5 g/dL   Urinalysis   Result Value Ref Range    Color, UA Yellow Straw/Yellow    Clarity, UA Clear Clear    Glucose, Ur Negative Negative mg/dL    Bilirubin Urine Negative Negative Ketones, Urine Negative Negative mg/dL    Specific Gravity, UA 1.025 1.005 - 1.030    Blood, Urine Negative Negative    pH, UA 6.0 5.0 - 8.0    Protein, UA Negative Negative mg/dL    Urobilinogen, Urine 0.2 <2.0 E.U./dL    Nitrite, Urine Negative Negative    Leukocyte Esterase, Urine Negative Negative    Microscopic Examination Not Indicated     Urine Type NotGiven    Lipase   Result Value Ref Range    Lipase 16.0 13.0 - 60.0 U/L   Protime-INR   Result Value Ref Range    Protime 16.0 (H) 10.0 - 13.2 sec    INR 1.37 (H) 0.86 - 1.14     RADIOLOGY    Ct Abdomen Pelvis Wo Contrast Additional Contrast? None    Result Date: 2/21/2021 EXAMINATION: CT OF THE ABDOMEN AND PELVIS WITHOUT CONTRAST 2/21/2021 1:08 pm TECHNIQUE: CT of the abdomen and pelvis was performed without the administration of intravenous contrast. Multiplanar reformatted images are provided for review. Dose modulation, iterative reconstruction, and/or weight based adjustment of the mA/kV was utilized to reduce the radiation dose to as low as reasonably achievable. COMPARISON: February 6, 2021 HISTORY: ORDERING SYSTEM PROVIDED HISTORY: abdominal pain TECHNOLOGIST PROVIDED HISTORY: Reason for exam:->abdominal pain Additional Contrast?->None Decision Support Exception->Emergency Medical Condition (MA) Reason for Exam: LLQ abd pain and vomitting x 3 days, states he has colon infection Acuity: Acute Type of Exam: Initial Relevant Medical/Surgical History: gb FINDINGS: Lower Chest: No evidence of pneumonia or other acute findings. Organs: No non contrast evidence of acute process of the organs of the abdomen. No evidence of pancreatitis. No ureteral stone or hydronephrosis. GI/Bowel: Edematous wall thickening of the descending colon with adjacent inflammatory stranding of fat has slightly increased. No evidence perforation or bowel obstruction, no acute findings of bowel elsewhere. Pelvis: No acute abnormality of the pelvis. Bladder mostly collapsed, otherwise unremarkable. Peritoneum/Retroperitoneum: No free air, ascites or abscess. Bones/Soft Tissues: No fracture or other acute osseous process. Slightly increased severity of colitis involving the descending colon which is most likely infectious or inflammatory, less likely ischemic. No evidence of complication of the colitis.      Ct Abdomen Pelvis Wo Contrast Additional Contrast? Oral    Result Date: 2/6/2021 Findings compatible with nonspecific infectious/inflammatory colitis involving distal splenic flexure through distal descending colon. No evidence for bowel obstruction, perforation or abscess formation. The findings were sent to the Radiology Results [id number] at 10:36 am on 2/6/2021to be communicated to caregiver's office. ED COURSE/MDM  Patient seen and evaluated. Old records reviewed. Diagnostic testing reviewed and results discussed. I have evaluated this patient. My supervising physician was available for consultation. Ernestina Shaw presented to the ED today with above noted complaints. Physical exam does reveal generalized abdominal discomfort upon palpation. CT scan of the abdomen and pelvis showed findings concernning for nonspecific infectious/inflammatory colitis involving the distal splenic flexure through the distal descending colon. This was done on 2/6/2021. Colonoscopy was done based on these results. Colonoscopy did show findings consistent with inflammatory bowel disease. Blood work does not show leukocytosis but absolute neutrophils are elevated at 8.3. No further differential shift. No anemia. No significant electrolyte abnormality. No evidence of acute kidney injury. BUN and creatinine are elevated at 25/1.4, this is similar to patient's prior results. No evidence of transaminitis. Lipase is normal at 16. PT/INR elevated but this is expected as patient is on anticoagulation chronically. As the patient was reporting worsening pain than what he has been having over the past 3 weeks I did obtain a CT of the abdomen and pelvis without contrast and it showed slightly increased severity of colitis in the descending colon. This is most likely infectious versus inflammatory and less likely ischemic. No evidence for complication of the colitis. I did consult GI and spoke with Dr. Jose Hansen who advised that the patient can stop the but desonide and we can start him on 2 weeks of Cipro Flagyl and to have him follow-up with GI in the office. I discussed this with the patient and he was in agreement with this plan. I am sending him home with antiemetics as well as medications to help with pain control. Pt was given the following medications or treatments in the ED:   Medications   ondansetron (ZOFRAN) injection 4 mg (4 mg Intravenous Given 2/21/21 1145)   morphine (PF) injection 4 mg (has no administration in time range)   HYDROcodone-acetaminophen (NORCO) 5-325 MG per tablet 1 tablet (has no administration in time range)   ciprofloxacin (CIPRO) tablet 500 mg (has no administration in time range)   metroNIDAZOLE (FLAGYL) tablet 500 mg (has no administration in time range)   0.9 % sodium chloride bolus (0 mLs Intravenous Stopped 2/21/21 1242)       At this point I do not feel the patient requires further work upand it is reasonable to discharge the patient. Please refer to AVS for further details regarding discharge instructions. A discussion was had with the patient regarding diagnosis, diagnostic testing results,treatment/ plan of care, and follow up. All questions were answered. Patient will follow up as directed for further evaluation/treatment. The patient was given strict return precautions as we discussed symptoms that wouldnecessitate return to the ED. Patient will return to ED for new/worsening symptoms. The patient verbalized their understanding and agreement with the above plan. Patient was sent home with a prescription for below medication/s. I did [place] patient on appropriate use of these medication.   New Prescriptions    CIPROFLOXACIN (CIPRO) 500 MG TABLET    Take 1 tablet by mouth 2 times daily for 14 days HYDROCODONE-ACETAMINOPHEN (NORCO) 5-325 MG PER TABLET    Take 1 tablet by mouth every 6 hours as needed for Pain for up to 3 days. METRONIDAZOLE (FLAGYL) 500 MG TABLET    Take 1 tablet by mouth 3 times daily for 14 days    ONDANSETRON (ZOFRAN ODT) 4 MG DISINTEGRATING TABLET    Take 1 tablet by mouth every 8 hours as needed for Nausea       I estimatethere is LOW risk for ACUTE APPENDICITIS, BOWEL OBSTRUCTION, CHOLECYSTITIS, DIVERTICULITIS, INCARCERATED HERNIA, PANCREATITIS, or PERFORATED BOWEL or ULCER, thus I consider the discharge disposition reasonable. Also, thereis no evidence or peritonitis, sepsis, or toxicity. Suzi Lujan and I have discussed the diagnosis and risks, and we agree with discharging home to follow-up with their primary doctor. We also discussed returning to the EmergencyDepartment immediately if new or worsening symptoms occur. We have discussed the symptoms which are most concerning (e.g., bloody stool, fever, changing or worsening pain, vomiting) that necessitate immediate return. CLINICAL IMPRESSION    1. Colitis    2. Lower abdominal pain    3. Nausea and vomiting, intractability of vomiting not specified, unspecified vomiting type           Discharge Vitals:  Blood pressure (!) 162/86, pulse 61, temperature 98.5 °F (36.9 °C), temperature source Oral, resp. rate 16, weight 180 lb (81.6 kg), SpO2 97 %. FOLLOW UP  Lauren Sorensen DO  [address] [place] [address] [address]  [address]  [phone]    Call in 1 day  For further evaluation    Azucena Rivera MD  [address] [place] [address], [facility] [phone]    Call in 1 day  For further evaluation    [facility]  ED  [facility]  [address] [phone]  Go to   If symptoms worsen      DISPOSITION  Patient was discharged to home in good condition. Comment: Pleasenote this report has been produced using speech recognition software and may contain errors related to that system including errors in grammar, punctuation, and spelling, as well as words and phrases that may beinappropriate. If there are any questions or concerns please feel free to contact the dictating provider for clarification.         JOLANTA Jang - CNP  02/21/21 0419

## 2021-02-23 ENCOUNTER — HOSPITAL ENCOUNTER (EMERGENCY)
Age: 66
Discharge: HOME | DRG: 393 | End: 2021-02-23
Payer: MEDICARE

## 2021-02-23 ENCOUNTER — APPOINTMENT (OUTPATIENT)
Dept: GENERAL RADIOLOGY | Age: 66
DRG: 393 | End: 2021-02-23
Payer: MEDICARE

## 2021-02-23 VITALS
WEIGHT: 178 LBS | HEART RATE: 62 BPM | HEIGHT: 71 IN | BODY MASS INDEX: 24.92 KG/M2 | RESPIRATION RATE: 14 BRPM | OXYGEN SATURATION: 97 % | SYSTOLIC BLOOD PRESSURE: 168 MMHG | DIASTOLIC BLOOD PRESSURE: 99 MMHG | TEMPERATURE: 98.1 F

## 2021-02-23 DIAGNOSIS — K59.00 CONSTIPATION, UNSPECIFIED CONSTIPATION TYPE: Primary | ICD-10-CM

## 2021-02-23 DIAGNOSIS — R10.30 LOWER ABDOMINAL PAIN: ICD-10-CM

## 2021-02-23 LAB
A/G RATIO: 1.2 (ref 1.1–2.2)
ALBUMIN SERPL-MCNC: 4.2 G/DL (ref 3.4–5)
ALP BLD-CCNC: 50 U/L (ref 40–129)
ALT SERPL-CCNC: 15 U/L (ref 10–40)
ANION GAP SERPL CALCULATED.3IONS-SCNC: 12 MMOL/L (ref 3–16)
AST SERPL-CCNC: 16 U/L (ref 15–37)
BASOPHILS ABSOLUTE: 0 K/UL (ref 0–0.2)
BASOPHILS RELATIVE PERCENT: 0.4 %
BILIRUB SERPL-MCNC: 0.6 MG/DL (ref 0–1)
BUN BLDV-MCNC: 20 MG/DL (ref 7–20)
CALCIUM SERPL-MCNC: 9.5 MG/DL (ref 8.3–10.6)
CHLORIDE BLD-SCNC: 92 MMOL/L (ref 99–110)
CO2: 27 MMOL/L (ref 21–32)
CREAT SERPL-MCNC: 1.3 MG/DL (ref 0.8–1.3)
EOSINOPHILS ABSOLUTE: 0.4 K/UL (ref 0–0.6)
EOSINOPHILS RELATIVE PERCENT: 3.9 %
GFR AFRICAN AMERICAN: >60
GFR NON-AFRICAN AMERICAN: 55
GLOBULIN: 3.6 G/DL
GLUCOSE BLD-MCNC: 160 MG/DL (ref 70–99)
HCT VFR BLD CALC: 43 % (ref 40.5–52.5)
HEMOGLOBIN: 14.1 G/DL (ref 13.5–17.5)
LACTIC ACID: 0.8 MMOL/L (ref 0.4–2)
LIPASE: 17 U/L (ref 13–60)
LYMPHOCYTES ABSOLUTE: 1 K/UL (ref 1–5.1)
LYMPHOCYTES RELATIVE PERCENT: 10.8 %
MCH RBC QN AUTO: 27.3 PG (ref 26–34)
MCHC RBC AUTO-ENTMCNC: 32.8 G/DL (ref 31–36)
MCV RBC AUTO: 83.2 FL (ref 80–100)
MONOCYTES ABSOLUTE: 0.8 K/UL (ref 0–1.3)
MONOCYTES RELATIVE PERCENT: 8.7 %
NEUTROPHILS ABSOLUTE: 7.4 K/UL (ref 1.7–7.7)
NEUTROPHILS RELATIVE PERCENT: 76.2 %
PDW BLD-RTO: 14 % (ref 12.4–15.4)
PLATELET # BLD: 230 K/UL (ref 135–450)
PMV BLD AUTO: 8.2 FL (ref 5–10.5)
POTASSIUM SERPL-SCNC: 4 MMOL/L (ref 3.5–5.1)
RBC # BLD: 5.17 M/UL (ref 4.2–5.9)
SODIUM BLD-SCNC: 131 MMOL/L (ref 136–145)
TOTAL PROTEIN: 7.8 G/DL (ref 6.4–8.2)
WBC # BLD: 9.7 K/UL (ref 4–11)

## 2021-02-23 PROCEDURE — 96374 THER/PROPH/DIAG INJ IV PUSH: CPT

## 2021-02-23 PROCEDURE — 6360000002 HC RX W HCPCS: Performed by: PHYSICIAN ASSISTANT

## 2021-02-23 PROCEDURE — 2580000003 HC RX 258: Performed by: PHYSICIAN ASSISTANT

## 2021-02-23 PROCEDURE — 85025 COMPLETE CBC W/AUTO DIFF WBC: CPT

## 2021-02-23 PROCEDURE — 99284 EMERGENCY DEPT VISIT MOD MDM: CPT

## 2021-02-23 PROCEDURE — 80053 COMPREHEN METABOLIC PANEL: CPT

## 2021-02-23 PROCEDURE — 83690 ASSAY OF LIPASE: CPT

## 2021-02-23 PROCEDURE — 83605 ASSAY OF LACTIC ACID: CPT

## 2021-02-23 PROCEDURE — 6370000000 HC RX 637 (ALT 250 FOR IP): Performed by: PHYSICIAN ASSISTANT

## 2021-02-23 PROCEDURE — 96375 TX/PRO/DX INJ NEW DRUG ADDON: CPT

## 2021-02-23 PROCEDURE — 74018 RADEX ABDOMEN 1 VIEW: CPT

## 2021-02-23 RX ORDER — ONDANSETRON 2 MG/ML
4 INJECTION INTRAMUSCULAR; INTRAVENOUS ONCE
Status: COMPLETED | OUTPATIENT
Start: 2021-02-23 | End: 2021-02-23

## 2021-02-23 RX ORDER — MORPHINE SULFATE 4 MG/ML
4 INJECTION, SOLUTION INTRAMUSCULAR; INTRAVENOUS ONCE
Status: COMPLETED | OUTPATIENT
Start: 2021-02-23 | End: 2021-02-23

## 2021-02-23 RX ORDER — 0.9 % SODIUM CHLORIDE 0.9 %
1000 INTRAVENOUS SOLUTION INTRAVENOUS ONCE
Status: COMPLETED | OUTPATIENT
Start: 2021-02-23 | End: 2021-02-23

## 2021-02-23 RX ADMIN — MORPHINE SULFATE 4 MG: 4 INJECTION, SOLUTION INTRAMUSCULAR; INTRAVENOUS at 17:18

## 2021-02-23 RX ADMIN — SODIUM CHLORIDE 1000 ML: 9 INJECTION, SOLUTION INTRAVENOUS at 17:21

## 2021-02-23 RX ADMIN — ONDANSETRON 4 MG: 2 INJECTION INTRAMUSCULAR; INTRAVENOUS at 17:18

## 2021-02-23 RX ADMIN — MAGNESIUM HYDROXIDE 30 ML: 400 SUSPENSION ORAL at 19:45

## 2021-02-23 NOTE — ED PROVIDER NOTES
Social History     Tobacco Use    Smoking status: Never Smoker    Smokeless tobacco: Never Used   Substance Use Topics    Alcohol use: Yes     Comment: 1-2 drinks per year    Drug use: No       SCREENINGS             PHYSICAL EXAM    (up to 7 for level 4, 8 or more for level 5)     ED Triage Vitals   BP Temp Temp Source Pulse Resp SpO2 Height Weight   02/23/21 1616 02/23/21 1616 02/23/21 1616 02/23/21 1603 02/23/21 1616 02/23/21 1603 02/23/21 1603 02/23/21 1603   (!) 148/92 98.1 °F (36.7 °C) Oral 79 18 100 % 5' 11\" (1.803 m) 178 lb (80.7 kg)       Physical Exam  Vitals signs and nursing note reviewed. Constitutional:       Appearance: Normal appearance. He is well-developed and normal weight. HENT:      Head: Normocephalic and atraumatic. Right Ear: External ear normal.      Left Ear: External ear normal.   Eyes:      General: No scleral icterus. Right eye: No discharge. Left eye: No discharge. Conjunctiva/sclera: Conjunctivae normal.   Neck:      Musculoskeletal: Normal range of motion and neck supple. Cardiovascular:      Rate and Rhythm: Normal rate and regular rhythm. Heart sounds: Normal heart sounds. Pulmonary:      Effort: Pulmonary effort is normal.      Breath sounds: Normal breath sounds. Abdominal:      General: Abdomen is flat. Bowel sounds are normal.      Palpations: Abdomen is soft. Tenderness: There is abdominal tenderness. Comments: The patient with tenderness left lower quadrant without rebound, mass or guarding. Musculoskeletal: Normal range of motion. Skin:     General: Skin is warm and dry. Neurological:      General: No focal deficit present. Mental Status: He is alert and oriented to person, place, and time. Mental status is at baseline. Psychiatric:         Mood and Affect: Mood normal.         Behavior: Behavior normal.         Thought Content:  Thought content normal.         Judgment: Judgment normal. DIAGNOSTIC RESULTS   LABS:    Labs Reviewed   COMPREHENSIVE METABOLIC PANEL - Abnormal; Notable for the following components:       Result Value    Sodium 131 (*)     Chloride 92 (*)     Glucose 160 (*)     GFR Non- 55 (*)     All other components within normal limits    Narrative:     Performed at:  [address] [place] [address], [facility]   Phone [phone]   CBC WITH AUTO DIFFERENTIAL    Narrative:     Performed at:  [address] [place] [address], [facility]   Phone [phone]   LIPASE    Narrative:     Performed at:  [address] [place] [address], [facility]   Phone [phone]   LACTIC ACID, PLASMA    Narrative:     Performed at:  [address] [place] [address], [facility]   Phone [phone]       All other labs were within normal range or not returned as of this dictation. EKG: All EKG's are interpreted by the Emergency Department Physician in the absence of a cardiologist.  Please see their note for interpretation of EKG. RADIOLOGY:   Non-plain film images such as CT, Ultrasound and MRI are read by the radiologist. Plain radiographic images are visualized and preliminarily interpreted by the ED Provider with the below findings:        Interpretation per the Radiologist below, if available at the time of this note:    XR ABDOMEN (KUB) (SINGLE AP VIEW)   Final Result   Nonspecific abdominal bowel gas pattern. Mild retained stool in the right   colon.            Ct Abdomen Pelvis Wo Contrast Additional Contrast? None    Result Date: 2/21/2021 EXAMINATION: CT OF THE ABDOMEN AND PELVIS WITHOUT CONTRAST 2/21/2021 1:08 pm TECHNIQUE: CT of the abdomen and pelvis was performed without the administration of intravenous contrast. Multiplanar reformatted images are provided for review. Dose modulation, iterative reconstruction, and/or weight based adjustment of the mA/kV was utilized to reduce the radiation dose to as low as reasonably achievable. COMPARISON: February 6, 2021 HISTORY: ORDERING SYSTEM PROVIDED HISTORY: abdominal pain TECHNOLOGIST PROVIDED HISTORY: Reason for exam:->abdominal pain Additional Contrast?->None Decision Support Exception->Emergency Medical Condition (MA) Reason for Exam: LLQ abd pain and vomitting x 3 days, states he has colon infection Acuity: Acute Type of Exam: Initial Relevant Medical/Surgical History: gb FINDINGS: Lower Chest: No evidence of pneumonia or other acute findings. Organs: No non contrast evidence of acute process of the organs of the abdomen. No evidence of pancreatitis. No ureteral stone or hydronephrosis. GI/Bowel: Edematous wall thickening of the descending colon with adjacent inflammatory stranding of fat has slightly increased. No evidence perforation or bowel obstruction, no acute findings of bowel elsewhere. Pelvis: No acute abnormality of the pelvis. Bladder mostly collapsed, otherwise unremarkable. Peritoneum/Retroperitoneum: No free air, ascites or abscess. Bones/Soft Tissues: No fracture or other acute osseous process. Slightly increased severity of colitis involving the descending colon which is most likely infectious or inflammatory, less likely ischemic. No evidence of complication of the colitis.            PROCEDURES   Unless otherwise noted below, none     Procedures    CRITICAL CARE TIME   N/A    CONSULTS:  None      EMERGENCY DEPARTMENT COURSE and DIFFERENTIAL DIAGNOSIS/MDM:   Vitals:    Vitals:    02/23/21 1603 02/23/21 1616 02/23/21 1723 02/23/21 1947 BP:  (!) 148/92 (!) 182/88 (!) 168/99   Pulse: 79 75 59 62   Resp:  18 16 14   Temp:  98.1 °F (36.7 °C)     TempSrc:  Oral     SpO2: 100% 99% 96% 97%   Weight: 178 lb (80.7 kg)      Height: 5' 11\" (1.803 m)          Patient was given the following medications:  Medications   ondansetron (ZOFRAN) injection 4 mg (4 mg Intravenous Given 2/23/21 1718)   morphine (PF) injection 4 mg (4 mg Intravenous Given 2/23/21 1718)   0.9 % sodium chloride bolus (0 mLs Intravenous Stopped 2/23/21 1845)   magnesium hydroxide (MILK OF MAGNESIA) 400 MG/5ML suspension 30 mL (30 mLs Oral Given 2/23/21 1945)           Patient presenting with constipation. Last BM 10 days ago following his colon prep. He did have a colonoscopy. He does not indicate any pathology. He has been seen twice with mild progression of colitis in the descending colon. He is on Cipro and Flagyl as a 48 hours ago. I do recommend he continue this medication. He was given milk of magnesia in the emergency room. He does have Dulcolax at home. I recommend going home taking 1 or 2 Dulcolax tablets to promote bowel movement. Patient to follow-up with GI the next 1 to 2 days. He is aware to return if his symptoms worsen. The patient does express understanding of his diagnosis and the treatment plan. FINAL IMPRESSION      1. Constipation, unspecified constipation type    2.  Lower abdominal pain          DISPOSITION/PLAN   DISPOSITION Decision To Discharge 02/23/2021 07:15:41 PM      PATIENT REFERREDTO:  Vicente Prather MD  [facility] [phone]    Schedule an appointment as soon as possible for a visit on 2/26/2021      Marii Salas  [address] [place] [address]  [phone]    Schedule an appointment as soon as possible for a visit in 3 days      [facility]  ED  [address]  Go to   If symptoms worsen      DISCHARGE MEDICATIONS: Discharge Medication List as of 2/23/2021  7:16 PM          DISCONTINUED MEDICATIONS:  Discharge Medication List as of 2/23/2021  7:16 PM                 (Please note that portions of this note were completed with a voice recognition program.  Efforts were made to edit the dictations but occasionally words are mis-transcribed. )    Silvano Andrew PA-C (electronically signed)           Silvano Andrew PA-C  02/23/21 1948

## 2021-02-23 NOTE — ED NOTES

## 2021-02-24 ENCOUNTER — APPOINTMENT (OUTPATIENT)
Dept: CT IMAGING | Age: 66
DRG: 393 | End: 2021-02-24
Payer: MEDICARE

## 2021-02-24 ENCOUNTER — HOSPITAL ENCOUNTER (INPATIENT)
Age: 66
LOS: 8 days | Discharge: HOME | DRG: 393 | End: 2021-03-04
Attending: EMERGENCY MEDICINE | Admitting: INTERNAL MEDICINE
Payer: MEDICARE

## 2021-02-24 DIAGNOSIS — I10 ESSENTIAL HYPERTENSION: ICD-10-CM

## 2021-02-24 DIAGNOSIS — R10.30 LOWER ABDOMINAL PAIN: ICD-10-CM

## 2021-02-24 DIAGNOSIS — R11.2 NAUSEA AND VOMITING, INTRACTABILITY OF VOMITING NOT SPECIFIED, UNSPECIFIED VOMITING TYPE: ICD-10-CM

## 2021-02-24 DIAGNOSIS — K52.9 COLITIS: Primary | ICD-10-CM

## 2021-02-24 LAB
A/G RATIO: 1.3 (ref 1.1–2.2)
ALBUMIN SERPL-MCNC: 4.2 G/DL (ref 3.4–5)
ALP BLD-CCNC: 57 U/L (ref 40–129)
ALT SERPL-CCNC: 15 U/L (ref 10–40)
ANION GAP SERPL CALCULATED.3IONS-SCNC: 13 MMOL/L (ref 3–16)
AST SERPL-CCNC: 15 U/L (ref 15–37)
BACTERIA: ABNORMAL /HPF
BASOPHILS ABSOLUTE: 0.1 K/UL (ref 0–0.2)
BASOPHILS RELATIVE PERCENT: 0.5 %
BILIRUB SERPL-MCNC: 0.7 MG/DL (ref 0–1)
BILIRUBIN URINE: NEGATIVE
BLOOD, URINE: NEGATIVE
BUN BLDV-MCNC: 20 MG/DL (ref 7–20)
CALCIUM SERPL-MCNC: 9.1 MG/DL (ref 8.3–10.6)
CHLORIDE BLD-SCNC: 95 MMOL/L (ref 99–110)
CLARITY: CLEAR
CO2: 25 MMOL/L (ref 21–32)
COLOR: YELLOW
CREAT SERPL-MCNC: 1.1 MG/DL (ref 0.8–1.3)
EOSINOPHILS ABSOLUTE: 0.2 K/UL (ref 0–0.6)
EOSINOPHILS RELATIVE PERCENT: 1.2 %
EPITHELIAL CELLS, UA: ABNORMAL /HPF (ref 0–5)
GFR AFRICAN AMERICAN: >60
GFR NON-AFRICAN AMERICAN: >60
GLOBULIN: 3.2 G/DL
GLUCOSE BLD-MCNC: 142 MG/DL (ref 70–99)
GLUCOSE URINE: NEGATIVE MG/DL
HCT VFR BLD CALC: 42.1 % (ref 40.5–52.5)
HEMOGLOBIN: 13.7 G/DL (ref 13.5–17.5)
KETONES, URINE: ABNORMAL MG/DL
LACTIC ACID: 1.3 MMOL/L (ref 0.4–2)
LEUKOCYTE ESTERASE, URINE: NEGATIVE
LYMPHOCYTES ABSOLUTE: 0.7 K/UL (ref 1–5.1)
LYMPHOCYTES RELATIVE PERCENT: 5.7 %
MCH RBC QN AUTO: 26.8 PG (ref 26–34)
MCHC RBC AUTO-ENTMCNC: 32.6 G/DL (ref 31–36)
MCV RBC AUTO: 82.2 FL (ref 80–100)
MICROSCOPIC EXAMINATION: YES
MONOCYTES ABSOLUTE: 1 K/UL (ref 0–1.3)
MONOCYTES RELATIVE PERCENT: 7.5 %
MUCUS: ABNORMAL /LPF
NEUTROPHILS ABSOLUTE: 11.1 K/UL (ref 1.7–7.7)
NEUTROPHILS RELATIVE PERCENT: 85.1 %
NITRITE, URINE: NEGATIVE
PDW BLD-RTO: 13.9 % (ref 12.4–15.4)
PH UA: 6.5 (ref 5–8)
PLATELET # BLD: 218 K/UL (ref 135–450)
PMV BLD AUTO: 8.4 FL (ref 5–10.5)
POTASSIUM SERPL-SCNC: 4.2 MMOL/L (ref 3.5–5.1)
PROTEIN UA: ABNORMAL MG/DL
RBC # BLD: 5.12 M/UL (ref 4.2–5.9)
RBC UA: ABNORMAL /HPF (ref 0–4)
SODIUM BLD-SCNC: 133 MMOL/L (ref 136–145)
SPECIFIC GRAVITY UA: >=1.03 (ref 1–1.03)
TOTAL PROTEIN: 7.4 G/DL (ref 6.4–8.2)
URINE TYPE: ABNORMAL
UROBILINOGEN, URINE: 0.2 E.U./DL
WBC # BLD: 13 K/UL (ref 4–11)
WBC UA: ABNORMAL /HPF (ref 0–5)

## 2021-02-24 PROCEDURE — 6360000004 HC RX CONTRAST MEDICATION: Performed by: NURSE PRACTITIONER

## 2021-02-24 PROCEDURE — 83605 ASSAY OF LACTIC ACID: CPT

## 2021-02-24 PROCEDURE — 2500000003 HC RX 250 WO HCPCS: Performed by: INTERNAL MEDICINE

## 2021-02-24 PROCEDURE — 85025 COMPLETE CBC W/AUTO DIFF WBC: CPT

## 2021-02-24 PROCEDURE — 1200000000 HC SEMI PRIVATE

## 2021-02-24 PROCEDURE — 2580000003 HC RX 258: Performed by: NURSE PRACTITIONER

## 2021-02-24 PROCEDURE — C9113 INJ PANTOPRAZOLE SODIUM, VIA: HCPCS | Performed by: INTERNAL MEDICINE

## 2021-02-24 PROCEDURE — 6360000002 HC RX W HCPCS: Performed by: INTERNAL MEDICINE

## 2021-02-24 PROCEDURE — 99284 EMERGENCY DEPT VISIT MOD MDM: CPT

## 2021-02-24 PROCEDURE — 96374 THER/PROPH/DIAG INJ IV PUSH: CPT

## 2021-02-24 PROCEDURE — 80053 COMPREHEN METABOLIC PANEL: CPT

## 2021-02-24 PROCEDURE — 81001 URINALYSIS AUTO W/SCOPE: CPT

## 2021-02-24 PROCEDURE — 74174 CTA ABD&PLVS W/CONTRAST: CPT

## 2021-02-24 PROCEDURE — 6360000002 HC RX W HCPCS: Performed by: NURSE PRACTITIONER

## 2021-02-24 PROCEDURE — 6370000000 HC RX 637 (ALT 250 FOR IP): Performed by: INTERNAL MEDICINE

## 2021-02-24 PROCEDURE — 2580000003 HC RX 258: Performed by: INTERNAL MEDICINE

## 2021-02-24 RX ORDER — SODIUM CHLORIDE 9 MG/ML
INJECTION, SOLUTION INTRAVENOUS CONTINUOUS
Status: DISCONTINUED | OUTPATIENT
Start: 2021-02-24 | End: 2021-03-04 | Stop reason: HOSPADM

## 2021-02-24 RX ORDER — PANTOPRAZOLE SODIUM 40 MG/10ML
40 INJECTION, POWDER, LYOPHILIZED, FOR SOLUTION INTRAVENOUS DAILY
Status: DISCONTINUED | OUTPATIENT
Start: 2021-02-24 | End: 2021-03-03 | Stop reason: SDUPTHER

## 2021-02-24 RX ORDER — SODIUM CHLORIDE 0.9 % (FLUSH) 0.9 %
10 SYRINGE (ML) INJECTION PRN
Status: DISCONTINUED | OUTPATIENT
Start: 2021-02-24 | End: 2021-03-04 | Stop reason: HOSPADM

## 2021-02-24 RX ORDER — SODIUM CHLORIDE 9 MG/ML
1000 INJECTION, SOLUTION INTRAVENOUS CONTINUOUS
Status: DISCONTINUED | OUTPATIENT
Start: 2021-02-24 | End: 2021-02-28

## 2021-02-24 RX ORDER — SODIUM CHLORIDE 0.9 % (FLUSH) 0.9 %
10 SYRINGE (ML) INJECTION EVERY 12 HOURS SCHEDULED
Status: DISCONTINUED | OUTPATIENT
Start: 2021-02-24 | End: 2021-03-04 | Stop reason: HOSPADM

## 2021-02-24 RX ORDER — ATORVASTATIN CALCIUM 10 MG/1
20 TABLET, FILM COATED ORAL DAILY
Status: DISCONTINUED | OUTPATIENT
Start: 2021-02-24 | End: 2021-03-04 | Stop reason: HOSPADM

## 2021-02-24 RX ORDER — ACETAMINOPHEN 650 MG/1
650 SUPPOSITORY RECTAL EVERY 6 HOURS PRN
Status: DISCONTINUED | OUTPATIENT
Start: 2021-02-24 | End: 2021-03-04 | Stop reason: HOSPADM

## 2021-02-24 RX ORDER — CIPROFLOXACIN 2 MG/ML
400 INJECTION, SOLUTION INTRAVENOUS EVERY 12 HOURS
Status: DISCONTINUED | OUTPATIENT
Start: 2021-02-24 | End: 2021-02-26

## 2021-02-24 RX ORDER — AMLODIPINE BESYLATE 5 MG/1
5 TABLET ORAL 2 TIMES DAILY
Status: DISCONTINUED | OUTPATIENT
Start: 2021-02-24 | End: 2021-03-01

## 2021-02-24 RX ORDER — ACETAMINOPHEN 325 MG/1
650 TABLET ORAL EVERY 6 HOURS PRN
Status: DISCONTINUED | OUTPATIENT
Start: 2021-02-24 | End: 2021-03-04 | Stop reason: HOSPADM

## 2021-02-24 RX ORDER — PROMETHAZINE HYDROCHLORIDE 25 MG/1
12.5 TABLET ORAL EVERY 6 HOURS PRN
Status: DISCONTINUED | OUTPATIENT
Start: 2021-02-24 | End: 2021-03-04 | Stop reason: HOSPADM

## 2021-02-24 RX ORDER — ONDANSETRON 2 MG/ML
4 INJECTION INTRAMUSCULAR; INTRAVENOUS EVERY 6 HOURS PRN
Status: DISCONTINUED | OUTPATIENT
Start: 2021-02-24 | End: 2021-03-04 | Stop reason: HOSPADM

## 2021-02-24 RX ORDER — MORPHINE SULFATE 4 MG/ML
4 INJECTION, SOLUTION INTRAMUSCULAR; INTRAVENOUS
Status: COMPLETED | OUTPATIENT
Start: 2021-02-24 | End: 2021-02-24

## 2021-02-24 RX ORDER — FENOFIBRATE 54 MG/1
54 TABLET ORAL DAILY
Status: DISCONTINUED | OUTPATIENT
Start: 2021-02-24 | End: 2021-03-04 | Stop reason: HOSPADM

## 2021-02-24 RX ORDER — HYDROCODONE BITARTRATE AND ACETAMINOPHEN 5; 325 MG/1; MG/1
1 TABLET ORAL EVERY 6 HOURS PRN
Status: DISCONTINUED | OUTPATIENT
Start: 2021-02-24 | End: 2021-03-04 | Stop reason: HOSPADM

## 2021-02-24 RX ORDER — BUDESONIDE 3 MG/1
3 CAPSULE, COATED PELLETS ORAL DAILY
Status: DISCONTINUED | OUTPATIENT
Start: 2021-02-25 | End: 2021-03-01

## 2021-02-24 RX ADMIN — ONDANSETRON HYDROCHLORIDE 4 MG: 2 SOLUTION INTRAMUSCULAR; INTRAVENOUS at 22:10

## 2021-02-24 RX ADMIN — PANTOPRAZOLE SODIUM 40 MG: 40 INJECTION, POWDER, FOR SOLUTION INTRAVENOUS at 22:10

## 2021-02-24 RX ADMIN — IOPAMIDOL 75 ML: 755 INJECTION, SOLUTION INTRAVENOUS at 14:45

## 2021-02-24 RX ADMIN — PIPERACILLIN AND TAZOBACTAM 3375 MG: 3; .375 INJECTION, POWDER, LYOPHILIZED, FOR SOLUTION INTRAVENOUS at 19:06

## 2021-02-24 RX ADMIN — METRONIDAZOLE 500 MG: 500 INJECTION, SOLUTION INTRAVENOUS at 23:28

## 2021-02-24 RX ADMIN — MORPHINE SULFATE 4 MG: 4 INJECTION, SOLUTION INTRAMUSCULAR; INTRAVENOUS at 17:05

## 2021-02-24 RX ADMIN — MORPHINE SULFATE 4 MG: 4 INJECTION, SOLUTION INTRAMUSCULAR; INTRAVENOUS at 22:10

## 2021-02-24 RX ADMIN — AMLODIPINE BESYLATE 5 MG: 5 TABLET ORAL at 22:10

## 2021-02-24 RX ADMIN — SODIUM CHLORIDE, PRESERVATIVE FREE 10 ML: 5 INJECTION INTRAVENOUS at 22:10

## 2021-02-24 RX ADMIN — SODIUM CHLORIDE: 9 INJECTION, SOLUTION INTRAVENOUS at 22:11

## 2021-02-24 RX ADMIN — CIPROFLOXACIN 400 MG: 2 INJECTION, SOLUTION INTRAVENOUS at 22:11

## 2021-02-24 RX ADMIN — SODIUM CHLORIDE 1000 ML: 9 INJECTION, SOLUTION INTRAVENOUS at 17:05

## 2021-02-24 RX ADMIN — ATORVASTATIN CALCIUM 20 MG: 10 TABLET, FILM COATED ORAL at 22:10

## 2021-02-24 ASSESSMENT — PAIN DESCRIPTION - PAIN TYPE: TYPE: ACUTE PAIN

## 2021-02-24 NOTE — ED NOTES
Consulted [facility] and spoke with [place].   Re: Colitis worsening, pt of Dr. Regina Andres- joshua Delacruz CNP [email or web address] Aligned[employer] returned Ivett Larsen  02/24/21 7674

## 2021-02-24 NOTE — ED PROVIDER NOTES
I independently performed a history and physical on [facility]. All diagnostic, treatment, and disposition decisions were made by myself in conjunction with the advanced practice provider. Patient with colitis, failed appropriate outpatient treatment with Cipro and Flagyl. Plan for IV antibiotics and admission. Appears nontoxic on reevaluation. For further details of [facility] emergency department encounter, please see LACY Reid's documentation.        Robi England MD  02/24/21 [id number]

## 2021-02-24 NOTE — ED PROVIDER NOTES
EMERGENCY DEPARTMENT ENCOUNTER      This patient was seen and evaluated by the attending physician. Pt Name: Zamzam Uriostegui  MRN: [id number]  [place] 1955  Date of evaluation: 2/24/2021  Provider: JOLANTA Nieves CNP-C  PCP: Heidi Flores, [place]  ED Attending: Tony Pastor MD    History provided by the patient. CHIEF COMPLAINT:     Chief Complaint   Patient presents with    Abdominal Pain     x 1 month, evaluated yesterday       HISTORY OF PRESENT ILLNESS:      Zamzam Uriostegui is a 76 y.o. male who presents [facility]  ED with complaints of abdominal pain. Patient has been seen here in the ER multiple times recently for abdominal pain and has been found to have colitis. Patient pain has been worsening. GI, Dr. Rolando Vernon, called to alert ER that he was sending patient and was concerned for ischemic colitis. Here for further evaluation. Ivett Muhammad  Severity:7  Duration:severl days  Modifying factors:none noted    Nursing Notes were reviewed     REVIEW OF SYSTEMS:     Review of Systems  All systems, atotal of 10, are reviewed and negative except for those that were just noted in history present illness.         PAST MEDICAL HISTORY:     Past Medical History:   Diagnosis Date    Hx of blood clots     Hyperlipidemia     Hypertension     PONV (postoperative nausea and vomiting)     Symptomatic cholelithiasis     Thrombosis          SURGICAL HISTORY:      Past Surgical History:   Procedure Laterality Date    ANKLE SURGERY      left    CHOLECYSTECTOMY, LAPAROSCOPIC N/A 8/13/2020    LAPAROSCOPIC CHOLECYSTECTOMY WITH INTRAOPERATIVE CHOLANGIOGRAM performed by Rafi Albarado MD at [facility]  03/08/2017    SHOULDER SURGERY Right 2012    TONSILLECTOMY      WRIST SURGERY      right         CURRENT MEDICATIONS:       Previous Medications    AMLODIPINE (NORVASC) 5 MG TABLET    Take 1 tablet by mouth 2 times daily    BUDESONIDE (ENTOCORT EC) 3 MG EXTENDED RELEASE CAPSULE    TAKE 3 BY MOUTH ONCE DAILY    CIPROFLOXACIN (CIPRO) 500 MG TABLET    Take 1 tablet by mouth 2 times daily for 14 days    DICYCLOMINE (BENTYL) 10 MG CAPSULE    TAKE 1 CAPSULE BY MOUTH TWICE DAILY    FENOFIBRATE (TRICOR) 145 MG TABLET    Take 1 tablet by mouth daily TAKE 1 TABLET BY MOUTH ONCE DAILY    HYDROCODONE-ACETAMINOPHEN (NORCO) 5-325 MG PER TABLET    Take 1 tablet by mouth every 6 hours as needed for Pain for up to 3 days. METRONIDAZOLE (FLAGYL) 500 MG TABLET    Take 1 tablet by mouth 3 times daily for 14 days    ONDANSETRON (ZOFRAN ODT) 4 MG DISINTEGRATING TABLET    Take 1 tablet by mouth every 8 hours as needed for Nausea    RIVAROXABAN (XARELTO) 20 MG TABS TABLET    Take 10 mg by mouth daily (with breakfast)     SIMVASTATIN (ZOCOR) 20 MG TABLET    Take 1 tablet by mouth nightly         ALLERGIES:    Patient has no known allergies.     FAMILY HISTORY:       Family History   Problem Relation Age of Onset    Breast Cancer Mother     Coronary Art Dis Father     Diabetes Sister           SOCIAL HISTORY:       Social History     Socioeconomic History    Marital status:      Spouse name: None    Number of children: None    Years of education: None    Highest education level: None   Occupational History    None   Social Needs    Financial resource strain: None    Food insecurity     Worry: None     Inability: None    Transportation needs     Medical: None     Non-medical: None   Tobacco Use    Smoking status: Never Smoker    Smokeless tobacco: Never Used   Substance and Sexual Activity    Alcohol use: Yes     Comment: 1-2 drinks per year    Drug use: No    Sexual activity: Yes   Lifestyle    Physical activity     Days per week: None     Minutes per session: None    Stress: None   Relationships    Social connections     Talks on phone: None     Gets together: None     Attends Restorationist service: None     Active member of club or organization: None     Attends meetings of clubs or organizations: None     Relationship status: None    Intimate partner violence     Fear of current or ex partner: None     Emotionally abused: None     Physically abused: None     Forced sexual activity: None   Other Topics Concern    None   Social History Narrative    None       SCREENINGS:            PHYSICAL EXAM:       ED Triage Vitals   BP Temp Temp Source Pulse Resp SpO2 Height Weight   02/24/21 1331 -- 02/24/21 1331 02/24/21 1331 02/24/21 1331 02/24/21 1331 02/24/21 1519 02/24/21 1331   (!) 174/90  Oral 77 18 95 % 5' 11\" (1.803 m) 178 lb (80.7 kg)       Physical Exam    CONSTITUTIONAL: Awake and alert. Cooperative. Well-developed. Well-nourished. Vitals:    02/24/21 1331 02/24/21 1519 02/24/21 1708   BP: (!) 174/90 (!) 175/84 (!) 163/82   Pulse: 77 69 71   Resp: 18 16 18   TempSrc: Oral     SpO2: 95% 96% 95%   Weight: 178 lb (80.7 kg) 178 lb (80.7 kg)    Height:  5' 11\" (1.803 m)      HENT: Normocephalic. Atraumatic. External ears normal, without discharge. TMs clear bilaterally. No nasal discharge. Oropharynx clear, no erythema. Mucous membranes moist.  EYES: Conjunctiva non-injected, no lid abnormalities noted. No scleral icterus. PERRL. EOM's grossly intact. Anterior chambers clear. NECK: Supple. Normal ROM. No meningismus. No thyroid tenderness or swelling noted. CARDIOVASCULAR: RRR. No Murmer. PULMONARY/CHEST WALL: Effort normal. No tachypnea. Lungs clear to ausculation. ABDOMEN: Normal BS. Soft. Nondistended. Diffuse tenderness to abdomen, worse in LLQ. No guarding. No hernias noted. No splenomegaly. Back: Spine is midline. No ecchymosis. No crepitus on palpation. No obvious subluxation of vertebral column. No saddle anesthesia or evidence of cauda equina. /ANORECTAL: Not assessed  MUSKULOSKELETAL: Normal ROM. No acute deformities. No edema. No tenderness to palpate. SKIN: Warm and dry. NEUROLOGICAL:  GCS 15. CN II-XII grossly intact.  Strength is 5/5 in allextremities and sensation is intact. PSYCHIATRIC: Normal affect, normal insight and judgement. Alert andoriented x 3.         DIAGNOSTIC RESULTS:     LABS:    Results for orders placed or performed during the hospital encounter of 02/24/21   CBC Auto Differential   Result Value Ref Range    WBC 13.0 (H) 4.0 - 11.0 K/uL    RBC 5.12 4.20 - 5.90 M/uL    Hemoglobin 13.7 13.5 - 17.5 g/dL    Hematocrit 42.1 40.5 - 52.5 %    MCV 82.2 80.0 - 100.0 fL    MCH 26.8 26.0 - 34.0 pg    MCHC 32.6 31.0 - 36.0 g/dL    RDW 13.9 12.4 - 15.4 %    Platelets 640 905 - 941 K/uL    MPV 8.4 5.0 - 10.5 fL    Neutrophils % 85.1 %    Lymphocytes % 5.7 %    Monocytes % 7.5 %    Eosinophils % 1.2 %    Basophils % 0.5 %    Neutrophils Absolute 11.1 (H) 1.7 - 7.7 K/uL    Lymphocytes Absolute 0.7 (L) 1.0 - 5.1 K/uL    Monocytes Absolute 1.0 0.0 - 1.3 K/uL    Eosinophils Absolute 0.2 0.0 - 0.6 K/uL    Basophils Absolute 0.1 0.0 - 0.2 K/uL   Comprehensive Metabolic Panel   Result Value Ref Range    Sodium 133 (L) 136 - 145 mmol/L    Potassium 4.2 3.5 - 5.1 mmol/L    Chloride 95 (L) 99 - 110 mmol/L    CO2 25 21 - 32 mmol/L    Anion Gap 13 3 - 16    Glucose 142 (H) 70 - 99 mg/dL    BUN 20 7 - 20 mg/dL    CREATININE 1.1 0.8 - 1.3 mg/dL    GFR Non-African American >60 >60    GFR African American >60 >60    Calcium 9.1 8.3 - 10.6 mg/dL    Total Protein 7.4 6.4 - 8.2 g/dL    Albumin 4.2 3.4 - 5.0 g/dL    Albumin/Globulin Ratio 1.3 1.1 - 2.2    Total Bilirubin 0.7 0.0 - 1.0 mg/dL    Alkaline Phosphatase 57 40 - 129 U/L    ALT 15 10 - 40 U/L    AST 15 15 - 37 U/L    Globulin 3.2 g/dL   Urinalysis   Result Value Ref Range    Color, UA Yellow Straw/Yellow    Clarity, UA Clear Clear    Glucose, Ur Negative Negative mg/dL    Bilirubin Urine Negative Negative    Ketones, Urine TRACE (A) Negative mg/dL    Specific Gravity, UA >=1.030 1.005 - 1.030    Blood, Urine Negative Negative    pH, UA 6.5 5.0 - 8.0    Protein, UA TRACE (A) Negative mg/dL Urobilinogen, Urine 0.2 <2.0 E.U./dL    Nitrite, Urine Negative Negative    Leukocyte Esterase, Urine Negative Negative    Microscopic Examination YES     Urine Type NotGiven    Lactic Acid, Plasma   Result Value Ref Range    Lactic Acid 1.3 0.4 - 2.0 mmol/L   Microscopic Urinalysis   Result Value Ref Range    Mucus, UA 1+ (A) None Seen /LPF    WBC, UA 0-2 0 - 5 /HPF    RBC, UA 0-2 0 - 4 /HPF    Epithelial Cells, UA 0-1 0 - 5 /HPF    Bacteria, UA Rare (A) None Seen /HPF         RADIOLOGY:  All x-ray studies are viewed/reviewedby me. Formal interpretations per the radiologist are as follows:      CTA ABDOMEN PELVIS W CONTRAST   Final Result   Colitis of the descending colon. The mesenteric and colic arteries are   patent, and there are no secondary findings of ischemia such as pneumatosis,   mesenteric/portal venous gas, or pneumoperitoneum                 EKG:  See EKG interpretation by an attending phsyician      PROCEDURES:   N/A    CRITICAL CARE TIME:   N/A    CONSULTS:  IP CONSULT TO GI  IP CONSULT TO GI  IP CONSULT TO HOSPITALIST      EMERGENCYDEPARTMENT COURSE and DIFFERENTIAL DIAGNOSIS/MDM:   Vitals:    Vitals:    02/24/21 1331 02/24/21 1519 02/24/21 1708   BP: (!) 174/90 (!) 175/84 (!) 163/82   Pulse: 77 69 71   Resp: 18 16 18   TempSrc: Oral     SpO2: 95% 96% 95%   Weight: 178 lb (80.7 kg) 178 lb (80.7 kg)    Height:  5' 11\" (1.803 m)        Patient was given the following medications:  Medications   morphine (PF) injection 4 mg (4 mg Intravenous Given 2/24/21 1705)   0.9 % sodium chloride infusion (1,000 mLs Intravenous New Bag 2/24/21 1705)   piperacillin-tazobactam (ZOSYN) 3,375 mg in dextrose 5 % 50 mL IVPB (mini-bag) (has no administration in time range)   iopamidol (ISOVUE-370) 76 % injection 75 mL (75 mLs Intravenous Given 2/24/21 1445)         Patient was evaluated by both myself and Ayse Pyle MD.    patient presented to the ER with complaints of abdominal pain.  Patient with multiple

## 2021-02-24 NOTE — H&P
3/7/21  Drue Angelucci, APRN - CNP   metroNIDAZOLE (FLAGYL) 500 MG tablet Take 1 tablet by mouth 3 times daily for 14 days 2/21/21 3/7/21  Drue Angelucci, APRN - CNP   HYDROcodone-acetaminophen (NORCO) 5-325 MG per tablet Take 1 tablet by mouth every 6 hours as needed for Pain for up to 3 days. 2/21/21 2/24/21  Drue Angelucci, APRN - CNP   ondansetron (ZOFRAN ODT) 4 MG disintegrating tablet Take 1 tablet by mouth every 8 hours as needed for Nausea 2/21/21   Drue Angelucci, APRN - CNP   simvastatin (ZOCOR) 20 MG tablet Take 1 tablet by mouth nightly 1/26/21   JOLANTA Florentino CNP   fenofibrate (TRICOR) 145 MG tablet Take 1 tablet by mouth daily TAKE 1 TABLET BY MOUTH ONCE DAILY 1/26/21   JOLANTA Florentino CNP   amLODIPine (NORVASC) 5 MG tablet Take 1 tablet by mouth 2 times daily 1/26/21   JOLANTA Florentino CNP   rivaroxaban (XARELTO) 20 MG TABS tablet Take 10 mg by mouth daily (with breakfast)     Historical Provider, MD       Allergies:  Patient has no known allergies. Social History:      The patient currently lives at home    TOBACCO:   reports that he has never smoked. He has never used smokeless tobacco.  ETOH:   reports current alcohol use. E-Cigarettes/Vaping Use     Questions Responses    E-Cigarette/Vaping Use Never User    Start Date     Passive Exposure     Quit Date     Counseling Given     Comments             Family History:       Reviewed in detail and negative for DM, CAD, Cancer, CVA. Positive as follows:        Problem Relation Age of Onset    Breast Cancer Mother     Coronary Art Dis Father     Diabetes Sister        REVIEW OF SYSTEMS:   Pertinent positives as noted in the HPI. All other systems reviewed and negative.     PHYSICAL EXAM PERFORMED:    BP (!) 157/85   Pulse 65   Temp 98.2 °F (36.8 °C) (Oral)   Resp 16   Ht 5' 11\" (1.803 m)   Wt 178 lb (80.7 kg)   SpO2 95%   BMI 24.83 kg/m²     General appearance:  No apparent distress, appears stated age and cooperative. HEENT:  Normal cephalic, atraumatic without obvious deformity. Pupils equal, round, and reactive to light. Extra ocular muscles intact. Conjunctivae/corneas clear. Neck: Supple, with full range of motion. No jugular venous distention. Trachea midline. Respiratory:  Normal respiratory effort. Clear to auscultation, bilaterally without Rales/Wheezes/Rhonchi. Cardiovascular:  Regular rate and rhythm with normal S1/S2 with murmurs,no rubs or gallops. Abdomen: Soft, left lower quadrant-tender, non-distended with normal bowel sounds. Musculoskeletal:  No clubbing, cyanosis , trace edema bilaterally. Full range of motion without deformity. Skin: Skin color, texture, turgor normal.  No rashes or lesions. Neurologic:  Neurovascularly intact without any focal sensory/motor deficits. Cranial nerves: II-XII intact, grossly non-focal.  Psychiatric:  Alert and oriented, thought content appropriate, normal insight  Capillary Refill: Brisk,< 3 seconds   Peripheral Pulses: +2 palpable, equal bilaterally       Labs:     Recent Labs     02/23/21  1627 02/24/21  1344   WBC 9.7 13.0*   HGB 14.1 13.7   HCT 43.0 42.1    218     Recent Labs     02/23/21  1627 02/24/21  1344   * 133*   K 4.0 4.2   CL 92* 95*   CO2 27 25   BUN 20 20   CREATININE 1.3 1.1   CALCIUM 9.5 9.1     Recent Labs     02/23/21  1627 02/24/21  1344   AST 16 15   ALT 15 15   BILITOT 0.6 0.7   ALKPHOS 50 57     No results for input(s): INR in the last 72 hours. No results for input(s): Karrie Cabin John in the last 72 hours.     Urinalysis:      Lab Results   Component Value Date    NITRU Negative 02/24/2021    WBCUA 0-2 02/24/2021    BACTERIA Rare 02/24/2021    RBCUA 0-2 02/24/2021    BLOODU Negative 02/24/2021    SPECGRAV >=1.030 02/24/2021    GLUCOSEU Negative 02/24/2021       Radiology:     CXR: I have reviewed the CXR with the following interpretation:   EKG:  I have reviewed the EKG with the following interpretation:     CTA ABDOMEN PELVIS W CONTRAST   Final Result   Colitis of the descending colon. The mesenteric and colic arteries are   patent, and there are no secondary findings of ischemia such as pneumatosis,   mesenteric/portal venous gas, or pneumoperitoneum             ASSESSMENT:    Active Hospital Problems    Diagnosis Date Noted    Colitis [K52.9] 02/24/2021         PLAN:    Abdominal pain -descending colitis admit, liquid diet, IV fluids, continue Cipro and Flagyl, GI evaluation, GI was contacted from the emergency room and advised to get surgical evaluation. 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 if he does not tolerate. Code Status: Full code    PT/OT Eval Status:     Dispo -admitted to Nelia Cai MD    Thank you Vahid BERNAL DO for the opportunity to be involved in this patient's care. If you have any questions or concerns please feel free to contact me at [phone].

## 2021-02-24 NOTE — ED NOTES

## 2021-02-25 LAB
ANION GAP SERPL CALCULATED.3IONS-SCNC: 10 MMOL/L (ref 3–16)
BASOPHILS ABSOLUTE: 0 K/UL (ref 0–0.2)
BASOPHILS RELATIVE PERCENT: 0.4 %
BUN BLDV-MCNC: 19 MG/DL (ref 7–20)
CALCIUM SERPL-MCNC: 8.6 MG/DL (ref 8.3–10.6)
CHLORIDE BLD-SCNC: 98 MMOL/L (ref 99–110)
CO2: 25 MMOL/L (ref 21–32)
CREAT SERPL-MCNC: 1.1 MG/DL (ref 0.8–1.3)
EOSINOPHILS ABSOLUTE: 0.3 K/UL (ref 0–0.6)
EOSINOPHILS RELATIVE PERCENT: 2.4 %
GFR AFRICAN AMERICAN: >60
GFR NON-AFRICAN AMERICAN: >60
GLUCOSE BLD-MCNC: 134 MG/DL (ref 70–99)
HCT VFR BLD CALC: 40 % (ref 40.5–52.5)
HEMOGLOBIN: 13.1 G/DL (ref 13.5–17.5)
LYMPHOCYTES ABSOLUTE: 0.7 K/UL (ref 1–5.1)
LYMPHOCYTES RELATIVE PERCENT: 5.7 %
MCH RBC QN AUTO: 27 PG (ref 26–34)
MCHC RBC AUTO-ENTMCNC: 32.7 G/DL (ref 31–36)
MCV RBC AUTO: 82.5 FL (ref 80–100)
MONOCYTES ABSOLUTE: 1 K/UL (ref 0–1.3)
MONOCYTES RELATIVE PERCENT: 8.1 %
NEUTROPHILS ABSOLUTE: 10.2 K/UL (ref 1.7–7.7)
NEUTROPHILS RELATIVE PERCENT: 83.4 %
PDW BLD-RTO: 13.8 % (ref 12.4–15.4)
PLATELET # BLD: 185 K/UL (ref 135–450)
PMV BLD AUTO: 7.5 FL (ref 5–10.5)
POTASSIUM REFLEX MAGNESIUM: 4.3 MMOL/L (ref 3.5–5.1)
RBC # BLD: 4.85 M/UL (ref 4.2–5.9)
SODIUM BLD-SCNC: 133 MMOL/L (ref 136–145)
WBC # BLD: 12.2 K/UL (ref 4–11)

## 2021-02-25 PROCEDURE — 6360000002 HC RX W HCPCS: Performed by: FAMILY MEDICINE

## 2021-02-25 PROCEDURE — 85025 COMPLETE CBC W/AUTO DIFF WBC: CPT

## 2021-02-25 PROCEDURE — 80048 BASIC METABOLIC PNL TOTAL CA: CPT

## 2021-02-25 PROCEDURE — C9113 INJ PANTOPRAZOLE SODIUM, VIA: HCPCS | Performed by: INTERNAL MEDICINE

## 2021-02-25 PROCEDURE — 99222 1ST HOSP IP/OBS MODERATE 55: CPT | Performed by: SURGERY

## 2021-02-25 PROCEDURE — 36415 COLL VENOUS BLD VENIPUNCTURE: CPT

## 2021-02-25 PROCEDURE — 2500000003 HC RX 250 WO HCPCS: Performed by: INTERNAL MEDICINE

## 2021-02-25 PROCEDURE — 1200000000 HC SEMI PRIVATE

## 2021-02-25 PROCEDURE — 6360000002 HC RX W HCPCS: Performed by: INTERNAL MEDICINE

## 2021-02-25 PROCEDURE — 6370000000 HC RX 637 (ALT 250 FOR IP): Performed by: INTERNAL MEDICINE

## 2021-02-25 PROCEDURE — 2580000003 HC RX 258: Performed by: INTERNAL MEDICINE

## 2021-02-25 RX ORDER — MORPHINE SULFATE 4 MG/ML
4 INJECTION, SOLUTION INTRAMUSCULAR; INTRAVENOUS
Status: DISCONTINUED | OUTPATIENT
Start: 2021-02-25 | End: 2021-03-04 | Stop reason: HOSPADM

## 2021-02-25 RX ORDER — MORPHINE SULFATE 2 MG/ML
2 INJECTION, SOLUTION INTRAMUSCULAR; INTRAVENOUS
Status: DISCONTINUED | OUTPATIENT
Start: 2021-02-25 | End: 2021-03-04 | Stop reason: HOSPADM

## 2021-02-25 RX ADMIN — ATORVASTATIN CALCIUM 20 MG: 10 TABLET, FILM COATED ORAL at 09:08

## 2021-02-25 RX ADMIN — ACETAMINOPHEN 650 MG: 325 TABLET ORAL at 18:51

## 2021-02-25 RX ADMIN — FENOFIBRATE 54 MG: 54 TABLET ORAL at 09:08

## 2021-02-25 RX ADMIN — CIPROFLOXACIN 400 MG: 2 INJECTION, SOLUTION INTRAVENOUS at 20:58

## 2021-02-25 RX ADMIN — SODIUM CHLORIDE: 9 INJECTION, SOLUTION INTRAVENOUS at 14:10

## 2021-02-25 RX ADMIN — MORPHINE SULFATE 4 MG: 4 INJECTION, SOLUTION INTRAMUSCULAR; INTRAVENOUS at 18:52

## 2021-02-25 RX ADMIN — AMLODIPINE BESYLATE 5 MG: 5 TABLET ORAL at 09:07

## 2021-02-25 RX ADMIN — AMLODIPINE BESYLATE 5 MG: 5 TABLET ORAL at 20:58

## 2021-02-25 RX ADMIN — SODIUM CHLORIDE, PRESERVATIVE FREE 10 ML: 5 INJECTION INTRAVENOUS at 09:08

## 2021-02-25 RX ADMIN — METRONIDAZOLE 500 MG: 500 INJECTION, SOLUTION INTRAVENOUS at 06:09

## 2021-02-25 RX ADMIN — METRONIDAZOLE 500 MG: 500 INJECTION, SOLUTION INTRAVENOUS at 14:10

## 2021-02-25 RX ADMIN — BUDESONIDE 3 MG: 3 CAPSULE ORAL at 09:08

## 2021-02-25 RX ADMIN — CIPROFLOXACIN 400 MG: 2 INJECTION, SOLUTION INTRAVENOUS at 09:09

## 2021-02-25 RX ADMIN — METRONIDAZOLE 500 MG: 500 INJECTION, SOLUTION INTRAVENOUS at 22:34

## 2021-02-25 RX ADMIN — PANTOPRAZOLE SODIUM 40 MG: 40 INJECTION, POWDER, FOR SOLUTION INTRAVENOUS at 09:08

## 2021-02-25 RX ADMIN — MORPHINE SULFATE 4 MG: 4 INJECTION, SOLUTION INTRAMUSCULAR; INTRAVENOUS at 21:12

## 2021-02-25 RX ADMIN — MORPHINE SULFATE 4 MG: 4 INJECTION, SOLUTION INTRAMUSCULAR; INTRAVENOUS at 12:26

## 2021-02-25 RX ADMIN — HYDROCODONE BITARTRATE AND ACETAMINOPHEN 1 TABLET: 5; 325 TABLET ORAL at 09:07

## 2021-02-25 ASSESSMENT — PAIN SCALES - GENERAL: PAINLEVEL_OUTOF10: 5

## 2021-02-25 NOTE — ED NOTES
Writer rounded on patient. Antibiotic completed and disconnected at this time. Patient denies further needs.       Spencer Aguirre RN  02/24/21 1956

## 2021-02-25 NOTE — CONSULTS
Gastroenterology Consult Note    Patient:   Fred Zamarripa   YOB: 1955   Facility:   [facility]   Referring/PCP: [place], [place]  Date:     2/25/2021  Consultant:   Becky Martinez MD    Subjective: This 76 y.o. male was admitted 2/24/2021 with a diagnosis of \"Colitis [K52. 9]\" and is seen in consultation regarding \"colitis\". Information was obtained from interview of the patient, examination of the patient, and review of records. I did update the past medical, surgical, social and / or family history. Colitis moderate in colon for weeks assoc w abd pain/const.    Current status  Present  Diet Order: DIET CLEAR LIQUID;  Recently, he has experienced mod-severe abdominal  Pain and he has required IV narcotic analgesics. The patient has also experienced no fever, diarrhea, hematochezia/melena      Prior to Admission medications    Medication Sig Start Date End Date Taking?  Authorizing Provider   ciprofloxacin (CIPRO) 500 MG tablet Take 1 tablet by mouth 2 times daily for 14 days 2/21/21 3/7/21 Yes Carlos FridayJOLANTA CNP   metroNIDAZOLE (FLAGYL) 500 MG tablet Take 1 tablet by mouth 3 times daily for 14 days 2/21/21 3/7/21 Yes Carlos FridayJOLANTA CNP   ondansetron (ZOFRAN ODT) 4 MG disintegrating tablet Take 1 tablet by mouth every 8 hours as needed for Nausea 2/21/21  Yes Carlos FridayJOLANTA CNP   simvastatin (ZOCOR) 20 MG tablet Take 1 tablet by mouth nightly 1/26/21  Yes JOLANTA Yeager CNP   fenofibrate (TRICOR) 145 MG tablet Take 1 tablet by mouth daily TAKE 1 TABLET BY MOUTH ONCE DAILY 1/26/21  Yes JOLANTA Yeager CNP   amLODIPine (NORVASC) 5 MG tablet Take 1 tablet by mouth 2 times daily 1/26/21  Yes JOLANTA Roberst CNP   rivaroxaban (XARELTO) 20 MG TABS tablet Take 10 mg by mouth daily (with breakfast)    Yes Historical Provider, MD      Scheduled Medications:    amLODIPine  5 mg Oral BID    fenofibrate  54 mg Oral stated age and cooperative  Lungs: clear to auscultation bilaterally  Chest wall: no tenderness  Heart: regular rate and rhythm, S1, S2 normal, no murmur, click, rub or gallop  Abdomen: soft, tender; bowel sounds normal; no masses,  no organomegaly  Extremities: extremities normal, atraumatic, no cyanosis or edema  Skin: Skin color, texture, turgor normal. No rashes or lesions  Neurologic: Grossly normal    Lab and Imaging Review   Recent Labs     02/23/21  1627 02/24/21  1344 02/25/21  0529 02/25/21  0530   WBC 9.7 13.0* 12.2*  --    HGB 14.1 13.7 13.1*  --    MCV 83.2 82.2 82.5  --     218 185  --    * 133*  --  133*   K 4.0 4.2  --  4.3   CL 92* 95*  --  98*   CO2 27 25  --  25   BUN 20 20  --  19   CREATININE 1.3 1.1  --  1.1   GLUCOSE 160* 142*  --  134*   CALCIUM 9.5 9.1  --  8.6   PROT 7.8 7.4  --   --    LABALBU 4.2 4.2  --   --    AST 16 15  --   --    ALT 15 15  --   --    ALKPHOS 50 57  --   --    BILITOT 0.6 0.7  --   --    LIPASE 17.0  --   --   --        Assessment:     Patient Active Problem List    Diagnosis Date Noted    Colitis 02/24/2021    Murmur 09/16/2020    Symptomatic cholelithiasis 08/11/2020    Stage 3 chronic kidney disease 03/09/2020    Chronic abdominal pain 04/30/2018    Essential hypertension 02/26/2018    Mixed hyperlipidemia 02/26/2018    Mesenteric vein thrombosis ([facility] [address] 02/20/2018     77 yo w HTN, CKD and SMV and PV thrombosis since 2/2018, on Xarelto, admitted w h/o persistent abd pain and ischemic colitis in prox descending colon and splenic flexure confirmed by colonoscopy/Bx on 2/15/21, refractory to 2 courses of outpatient Abx's. Plan:   1. Supportive care  2. Agree w IV Abx's  3. Consider ultimate partial colectomy if colitis/pain persist  4. Appreciate Surgery's help  5.  Will follow    Chuck Cobos MD       (O) [phone]

## 2021-02-25 NOTE — PLAN OF CARE
Problem: Pain:  Goal: Pain level will decrease  Description: Pain level will decrease  Outcome: Ongoing    Pt A&Ox4. Pt can appropriately score pain on 0-10 pain scale. Pt c/o acute abdominal pain 6/10 this shift. PRN medications given per MAR. Upon reassessment, pt asleep with RR of 16. Will continue to monitor.

## 2021-02-25 NOTE — CONSULTS
Department of General Surgery Consult    PATIENT NAME: Isis Pete   YOB: 1955    ADMISSION DATE: 2/24/2021  1:28 PM      TODAY'S DATE: 2/25/2021    Reason for Consult:  Descending colitis    Chief Complaint: L abdominal pain    Requesting Physician:  Emi Lynn    HISTORY OF PRESENT ILLNESS:              The patient is a 76 y.o. male who presents with ~30 day h/o persistent L-sided abdominal pain, as per patient. He has been followed by his GI, Dr Emi Lynn as outpatient, reports mult rounds of antibiotics, CT's and a colonoscopy. All studies show focal severe inflammation of the mid-descending colon. No signs of arterial insufficiency on recent CT-A. He does have prior h/o SMV/portal vein thrombosis a few years ago, has been on Xarelto since. CT-A did not note any venous thrombus. Pt currently notes pain is persistent but stable. No N/V, taking some clear liquids. No fever, chills, diarrhea or hematochezia (actually denies a BM in >1 week).     Past Medical History:        Diagnosis Date    Hx of blood clots     Hyperlipidemia     Hypertension     PONV (postoperative nausea and vomiting)     Symptomatic cholelithiasis     Thrombosis        Past Surgical History:        Procedure Laterality Date    ANKLE SURGERY      left    CHOLECYSTECTOMY, LAPAROSCOPIC N/A 8/13/2020    LAPAROSCOPIC CHOLECYSTECTOMY WITH INTRAOPERATIVE CHOLANGIOGRAM performed by Tato Quiroz MD at [address] [place] [address]  03/08/2017    SHOULDER SURGERY Right 2012    TONSILLECTOMY      WRIST SURGERY      right       Current Medications:   Current Facility-Administered Medications: morphine (PF) injection 2 mg, 2 mg, Intravenous, Q2H PRN **OR** morphine (PF) injection 4 mg, 4 mg, Intravenous, Q2H PRN  0.9 % sodium chloride infusion, 1,000 mL, Intravenous, Continuous  amLODIPine (NORVASC) tablet 5 mg, 5 mg, Oral, BID  fenofibrate (TRICOR) tablet 54 mg, 54 mg, Oral, Daily  HYDROcodone-acetaminophen (NORCO) 5-325 MG per tablet 1 tablet, 1 tablet, Oral, Q6H PRN  rivaroxaban (XARELTO) tablet 15 mg, 15 mg, Oral, Daily with breakfast  atorvastatin (LIPITOR) tablet 20 mg, 20 mg, Oral, Daily  budesonide (ENTOCORT EC) extended release capsule 3 mg, 3 mg, Oral, Daily  sodium chloride flush 0.9 % injection 10 mL, 10 mL, Intravenous, 2 times per day  sodium chloride flush 0.9 % injection 10 mL, 10 mL, Intravenous, PRN  promethazine (PHENERGAN) tablet 12.5 mg, 12.5 mg, Oral, Q6H PRN **OR** ondansetron (ZOFRAN) injection 4 mg, 4 mg, Intravenous, Q6H PRN  acetaminophen (TYLENOL) tablet 650 mg, 650 mg, Oral, Q6H PRN **OR** acetaminophen (TYLENOL) suppository 650 mg, 650 mg, Rectal, Q6H PRN  0.9 % sodium chloride infusion, , Intravenous, Continuous  ciprofloxacin (CIPRO) IVPB 400 mg, 400 mg, Intravenous, Q12H  metronidazole (FLAGYL) 500 mg in NaCl 100 mL IVPB premix, 500 mg, Intravenous, Q8H  pantoprazole (PROTONIX) injection 40 mg, 40 mg, Intravenous, Daily  Prior to Admission medications    Medication Sig Start Date End Date Taking?  Authorizing Provider   ciprofloxacin (CIPRO) 500 MG tablet Take 1 tablet by mouth 2 times daily for 14 days 2/21/21 3/7/21 Yes JOLANTA Garza CNP   metroNIDAZOLE (FLAGYL) 500 MG tablet Take 1 tablet by mouth 3 times daily for 14 days 2/21/21 3/7/21 Yes JOLANTA Garza CNP   ondansetron (ZOFRAN ODT) 4 MG disintegrating tablet Take 1 tablet by mouth every 8 hours as needed for Nausea 2/21/21  Yes JOLANTA Garza CNP   simvastatin (ZOCOR) 20 MG tablet Take 1 tablet by mouth nightly 1/26/21  Yes JOLANTA Chase CNP   fenofibrate (TRICOR) 145 MG tablet Take 1 tablet by mouth daily TAKE 1 TABLET BY MOUTH ONCE DAILY 1/26/21  Yes JOLANTA Chase CNP   amLODIPine (NORVASC) 5 MG tablet Take 1 tablet by mouth 2 times daily 1/26/21  Yes Barbara Azar, APRN - CNP   rivaroxaban (XARELTO) 20 MG TABS tablet Take 10 mg by mouth daily (with breakfast)    Yes Historical Provider, MD        Allergies:  Patient has no known allergies. Social History:   TOBACCO:   reports that he has never smoked. He has never used smokeless tobacco.  ETOH:   reports current alcohol use. DRUGS:   reports no history of drug use. OCCUPATION:    Patient currently lives      Family History:        Problem Relation Age of Onset    Breast Cancer Mother     Coronary Art Dis Father     Diabetes Sister        REVIEW OF SYSTEMS:  CONSTITUTIONAL:  positive for  fatigue  HEENT:  negative  RESPIRATORY:  negative  CARDIOVASCULAR:  negative  GASTROINTESTINAL:  negative except for abdominal pain  GENITOURINARY:  negative  HEMATOLOGIC/LYMPHATIC:  negative  NEUROLOGICAL:  Negative  * All other ROS reviewed and negative. PHYSICAL EXAM:  VITALS:  BP (!) 171/83   Pulse 70   Temp 100 °F (37.8 °C) (Oral)   Resp 14   Ht 5' 11\" (1.803 m)   Wt 180 lb 9.6 oz (81.9 kg)   SpO2 96%   BMI 25.19 kg/m²   24HR INTAKE/OUTPUT:    I/O last 3 completed shifts: In: 3405 [P.O.:120;  I.V.:1372; IV Piggyback:100]  Out: 0   I/O this shift:  In: 100 [IV Piggyback:100]  Out: -     CONSTITUTIONAL:  alert, no apparent distress and normal weight  EYES:  PERRL, sclera clear  ENT:  Normocephalic,atraumatic, without obvious abnormality  NECK:  supple, symmetrical, trachea midline  LUNGS: Resp effort easy and unlabored,    CARDIOVASCULAR:  NO JVD, regular rate and rhythm and    ABDOMEN:   ,  , soft, non-distended, tenderness noted in the left lower quadrant, involuntary guarding absent, no masses palpated and    MUSCULOSKELETAL: No clubbing or cyanosis, 0+ pitting edema lower extremities  NEUROLOGIC:  Mental Status Exam:  Level of Alertness:   awake  PSYCHIATRIC:   person, place, time  SKIN:  normal skin color, texture, turgor    DATA:    CBC:   Recent Labs     02/23/21  1627 02/24/21  1344 02/25/21  0529   WBC 9.7 13.0* 12.2*   HGB 14.1 13.7 13.1*   HCT 43.0 42.1 40.0*    218 185     BMP:    Recent Labs     02/23/21  1627 02/24/21  1344 02/25/21  0530   * 133* 133*   K 4.0 4.2 4.3   CL 92* 95* 98*   CO2 27 25 25   BUN 20 20 19   CREATININE 1.3 1.1 1.1   GLUCOSE 160* 142* 134*     Hepatic:   Recent Labs     02/23/21  1627 02/24/21  1344   AST 16 15   ALT 15 15   BILITOT 0.6 0.7   ALKPHOS 50 57     Mag:    No results for input(s): MG in the last 72 hours. Phos:   No results for input(s): PHOS in the last 72 hours. INR: No results for input(s): INR in the last 72 hours. Radiology Review: Images personally reviewed by me. CTA OF THE ABDOMEN AND PELVIS WITH CONTRAST       2/24/2021 2:36 pm:       TECHNIQUE:   CTA of the abdomen and pelvis was performed with the administration of   intravenous contrast. Multiplanar reformatted images are provided for review. MIP images are provided for review.  Dose modulation, iterative   reconstruction, and/or weight based adjustment of the mA/kV was utilized to   reduce the radiation dose to as low as reasonably achievable.       COMPARISON:   None.       HISTORY:   ORDERING SYSTEM PROVIDED HISTORY: abnormal imaging, concern for ischemia   TECHNOLOGIST PROVIDED HISTORY:   Reason for exam:->abnormal imaging, concern for ischemia   Decision Support Exception->Emergency Medical Condition (MA)   Reason for Exam: 1 mth abd pain,   Acuity: Acute   Type of Exam: Initial       FINDINGS:       CTA ABDOMEN:       Abdominal aorta normal in caliber.  The visceral arteries are patent with no   stenosis or occlusion.  In particular, the superior mesenteric and inferior   mesenteric arteries are patent, and the colic arteries are patent.  The solid   abdominal organs have an unremarkable arterial phase appearance.  There is no   portal venous gas.  The gallbladder is surgically absent.  There is wall   thickening of the descending colon with haziness of the pericolic fat.  There   is no pneumatosis or mesenteric venous gas.  No other gastrointestinal   abnormality demonstrated.  Normal appendix.  No

## 2021-02-25 NOTE — PROGRESS NOTES
Pt arrived from ED to C346 via wheelchair. Admission  assessment completed per documentation. Pt A&Ox4. VSS. Pt awake in bed. Pt c/o acute abdominal pain 5/10. Bilateral lung sounds clear. Abdomen soft, rounded, and non-distended. Bowel sounds active in all four quadrants. Pt reports n/v and constipation. Bed locked and in lowest position. Nonskid slippers on. Side rails up 2/4. Call light and personal belongings within reach. Will continue to monitor.

## 2021-02-26 LAB
A/G RATIO: 1.1 (ref 1.1–2.2)
ALBUMIN SERPL-MCNC: 3.3 G/DL (ref 3.4–5)
ALP BLD-CCNC: 61 U/L (ref 40–129)
ALT SERPL-CCNC: 14 U/L (ref 10–40)
ANION GAP SERPL CALCULATED.3IONS-SCNC: 8 MMOL/L (ref 3–16)
AST SERPL-CCNC: 14 U/L (ref 15–37)
BASOPHILS ABSOLUTE: 0.1 K/UL (ref 0–0.2)
BASOPHILS RELATIVE PERCENT: 0.8 %
BILIRUB SERPL-MCNC: 0.6 MG/DL (ref 0–1)
BUN BLDV-MCNC: 18 MG/DL (ref 7–20)
CALCIUM SERPL-MCNC: 8.6 MG/DL (ref 8.3–10.6)
CHLORIDE BLD-SCNC: 100 MMOL/L (ref 99–110)
CO2: 28 MMOL/L (ref 21–32)
CREAT SERPL-MCNC: 1.1 MG/DL (ref 0.8–1.3)
EOSINOPHILS ABSOLUTE: 0.4 K/UL (ref 0–0.6)
EOSINOPHILS RELATIVE PERCENT: 4 %
GFR AFRICAN AMERICAN: >60
GFR NON-AFRICAN AMERICAN: >60
GLOBULIN: 2.9 G/DL
GLUCOSE BLD-MCNC: 123 MG/DL (ref 70–99)
HCT VFR BLD CALC: 37.2 % (ref 40.5–52.5)
HEMOGLOBIN: 12.4 G/DL (ref 13.5–17.5)
LYMPHOCYTES ABSOLUTE: 1 K/UL (ref 1–5.1)
LYMPHOCYTES RELATIVE PERCENT: 9.8 %
MCH RBC QN AUTO: 27.4 PG (ref 26–34)
MCHC RBC AUTO-ENTMCNC: 33.2 G/DL (ref 31–36)
MCV RBC AUTO: 82.3 FL (ref 80–100)
MONOCYTES ABSOLUTE: 0.9 K/UL (ref 0–1.3)
MONOCYTES RELATIVE PERCENT: 8.4 %
NEUTROPHILS ABSOLUTE: 8.2 K/UL (ref 1.7–7.7)
NEUTROPHILS RELATIVE PERCENT: 77 %
PDW BLD-RTO: 13.8 % (ref 12.4–15.4)
PLATELET # BLD: 185 K/UL (ref 135–450)
PMV BLD AUTO: 7.7 FL (ref 5–10.5)
POTASSIUM REFLEX MAGNESIUM: 4 MMOL/L (ref 3.5–5.1)
RBC # BLD: 4.52 M/UL (ref 4.2–5.9)
SODIUM BLD-SCNC: 136 MMOL/L (ref 136–145)
TOTAL PROTEIN: 6.2 G/DL (ref 6.4–8.2)
WBC # BLD: 10.6 K/UL (ref 4–11)

## 2021-02-26 PROCEDURE — 99232 SBSQ HOSP IP/OBS MODERATE 35: CPT | Performed by: SURGERY

## 2021-02-26 PROCEDURE — 6360000002 HC RX W HCPCS: Performed by: INTERNAL MEDICINE

## 2021-02-26 PROCEDURE — 80053 COMPREHEN METABOLIC PANEL: CPT

## 2021-02-26 PROCEDURE — 2500000003 HC RX 250 WO HCPCS: Performed by: INTERNAL MEDICINE

## 2021-02-26 PROCEDURE — 6370000000 HC RX 637 (ALT 250 FOR IP): Performed by: INTERNAL MEDICINE

## 2021-02-26 PROCEDURE — 2580000003 HC RX 258: Performed by: INTERNAL MEDICINE

## 2021-02-26 PROCEDURE — APPSS45 APP SPLIT SHARED TIME 31-45 MINUTES: Performed by: CLINICAL NURSE SPECIALIST

## 2021-02-26 PROCEDURE — 85025 COMPLETE CBC W/AUTO DIFF WBC: CPT

## 2021-02-26 PROCEDURE — 36415 COLL VENOUS BLD VENIPUNCTURE: CPT

## 2021-02-26 PROCEDURE — 99222 1ST HOSP IP/OBS MODERATE 55: CPT | Performed by: INTERNAL MEDICINE

## 2021-02-26 PROCEDURE — 6360000002 HC RX W HCPCS: Performed by: FAMILY MEDICINE

## 2021-02-26 PROCEDURE — 1200000000 HC SEMI PRIVATE

## 2021-02-26 PROCEDURE — C9113 INJ PANTOPRAZOLE SODIUM, VIA: HCPCS | Performed by: INTERNAL MEDICINE

## 2021-02-26 RX ORDER — POLYETHYLENE GLYCOL 3350 17 G/17G
17 POWDER, FOR SOLUTION ORAL DAILY
Status: DISCONTINUED | OUTPATIENT
Start: 2021-02-26 | End: 2021-02-28

## 2021-02-26 RX ADMIN — MORPHINE SULFATE 4 MG: 4 INJECTION, SOLUTION INTRAMUSCULAR; INTRAVENOUS at 06:12

## 2021-02-26 RX ADMIN — SODIUM CHLORIDE, PRESERVATIVE FREE 10 ML: 5 INJECTION INTRAVENOUS at 09:31

## 2021-02-26 RX ADMIN — POLYETHYLENE GLYCOL 3350 17 G: 17 POWDER, FOR SOLUTION ORAL at 09:30

## 2021-02-26 RX ADMIN — MORPHINE SULFATE 2 MG: 2 INJECTION, SOLUTION INTRAMUSCULAR; INTRAVENOUS at 12:25

## 2021-02-26 RX ADMIN — BUDESONIDE 3 MG: 3 CAPSULE ORAL at 09:31

## 2021-02-26 RX ADMIN — ATORVASTATIN CALCIUM 20 MG: 10 TABLET, FILM COATED ORAL at 09:31

## 2021-02-26 RX ADMIN — MORPHINE SULFATE 2 MG: 2 INJECTION, SOLUTION INTRAMUSCULAR; INTRAVENOUS at 09:29

## 2021-02-26 RX ADMIN — HYDROCODONE BITARTRATE AND ACETAMINOPHEN 1 TABLET: 5; 325 TABLET ORAL at 16:43

## 2021-02-26 RX ADMIN — ONDANSETRON HYDROCHLORIDE 4 MG: 2 SOLUTION INTRAMUSCULAR; INTRAVENOUS at 02:43

## 2021-02-26 RX ADMIN — METRONIDAZOLE 500 MG: 500 INJECTION, SOLUTION INTRAVENOUS at 06:04

## 2021-02-26 RX ADMIN — MORPHINE SULFATE 4 MG: 4 INJECTION, SOLUTION INTRAMUSCULAR; INTRAVENOUS at 18:33

## 2021-02-26 RX ADMIN — MORPHINE SULFATE 4 MG: 4 INJECTION, SOLUTION INTRAMUSCULAR; INTRAVENOUS at 01:23

## 2021-02-26 RX ADMIN — SODIUM CHLORIDE: 9 INJECTION, SOLUTION INTRAVENOUS at 18:33

## 2021-02-26 RX ADMIN — SODIUM CHLORIDE: 9 INJECTION, SOLUTION INTRAVENOUS at 06:04

## 2021-02-26 RX ADMIN — AMLODIPINE BESYLATE 5 MG: 5 TABLET ORAL at 09:31

## 2021-02-26 RX ADMIN — FENOFIBRATE 54 MG: 54 TABLET ORAL at 09:31

## 2021-02-26 RX ADMIN — PIPERACILLIN SODIUM AND TAZOBACTAM SODIUM 4500 MG: 4; .5 INJECTION, POWDER, LYOPHILIZED, FOR SOLUTION INTRAVENOUS at 14:09

## 2021-02-26 RX ADMIN — CIPROFLOXACIN 400 MG: 2 INJECTION, SOLUTION INTRAVENOUS at 09:31

## 2021-02-26 RX ADMIN — MORPHINE SULFATE 2 MG: 2 INJECTION, SOLUTION INTRAMUSCULAR; INTRAVENOUS at 15:55

## 2021-02-26 RX ADMIN — PANTOPRAZOLE SODIUM 40 MG: 40 INJECTION, POWDER, FOR SOLUTION INTRAVENOUS at 09:31

## 2021-02-26 RX ADMIN — AMLODIPINE BESYLATE 5 MG: 5 TABLET ORAL at 20:48

## 2021-02-26 RX ADMIN — HYDROCODONE BITARTRATE AND ACETAMINOPHEN 1 TABLET: 5; 325 TABLET ORAL at 02:42

## 2021-02-26 RX ADMIN — PIPERACILLIN SODIUM AND TAZOBACTAM SODIUM 4500 MG: 4; .5 INJECTION, POWDER, LYOPHILIZED, FOR SOLUTION INTRAVENOUS at 20:49

## 2021-02-26 RX ADMIN — MORPHINE SULFATE 4 MG: 4 INJECTION, SOLUTION INTRAMUSCULAR; INTRAVENOUS at 21:01

## 2021-02-26 ASSESSMENT — PAIN SCALES - GENERAL
PAINLEVEL_OUTOF10: 7
PAINLEVEL_OUTOF10: 7
PAINLEVEL_OUTOF10: 5
PAINLEVEL_OUTOF10: 5
PAINLEVEL_OUTOF10: 6
PAINLEVEL_OUTOF10: 7

## 2021-02-26 ASSESSMENT — PAIN DESCRIPTION - ORIENTATION
ORIENTATION: LEFT

## 2021-02-26 ASSESSMENT — PAIN DESCRIPTION - PAIN TYPE
TYPE: ACUTE PAIN
TYPE: ACUTE PAIN

## 2021-02-26 ASSESSMENT — PAIN DESCRIPTION - LOCATION: LOCATION: ABDOMEN

## 2021-02-26 NOTE — PROGRESS NOTES
[facility]    PATIENT NAME: Matthew Lacey     TODAY'S DATE: 2/26/2021    CHIEF COMPLAINT: abd pain    INTERVAL HISTORY/HPI:    Pt reports his pain is some better today - no BMs. REVIEW OF SYSTEMS:  Pertinent positives and negatives as per interval history section    OBJECTIVE:  VITALS:  BP (!) 169/80   Pulse 73   Temp 99.1 °F (37.3 °C) (Oral)   Resp 14   Ht 5' 11\" (1.803 m)   Wt 180 lb 9.6 oz (81.9 kg)   SpO2 96%   BMI 25.19 kg/m²     INTAKE/OUTPUT:    I/O last 3 completed shifts: In: 3212.5 [P.O.:260; I.V.:2652.5; IV Piggyback:300]  Out: 125 [Urine:125]  I/O this shift:  In: 100 [IV Piggyback:100]  Out: -     CONSTITUTIONAL:  awake and alert  LUNGS:  Respirations easy and unlabored, no crackles or wheezing  CARD:  regular rate and rhythm  ABDOMEN:  hypoactive bowel sounds, soft, non-distended, tenderness noted in the left lower quadrant     Data:  CBC:   Recent Labs     02/24/21  1344 02/25/21  0529 02/26/21  0536   WBC 13.0* 12.2* 10.6   HGB 13.7 13.1* 12.4*   HCT 42.1 40.0* 37.2*    185 185     BMP:    Recent Labs     02/24/21  1344 02/25/21  0530 02/26/21  0536   * 133* 136   K 4.2 4.3 4.0   CL 95* 98* 100   CO2 25 25 28   BUN 20 19 18   CREATININE 1.1 1.1 1.1   GLUCOSE 142* 134* 123*     Hepatic:   Recent Labs     02/23/21  1627 02/24/21  1344 02/26/21  0536   AST 16 15 14*   ALT 15 15 14   BILITOT 0.6 0.7 0.6   ALKPHOS 50 57 61         ASSESSMENT AND PLAN:  Descending colitis of unclear etiology with focal segment of thickened colon. Continue with current therapy     Will discuss case further with ID to see if they have any input regarding possible infectious causes. Electronically signed by Jane Reilly APRN - [facility]    Surgery Staff    I have examined this patient and read and agree with the note by Jane Reilly CNP from today. Still  Unclear as to etiology of colitis, does not appear ischemic by angiogram.  ? Infectious - will ask for ID input, options for antimicrobial diagnosis and therapy. Further plans pending GI input.     MERT Driverdo THOMAS

## 2021-02-26 NOTE — PROGRESS NOTES
Pt is a/o x4. VSS. Assessment as charted. - Pt has slightly distended abd and complains of moderate to severe pain that is continuous- PRN Morphine given per Md order. Pt complains of constipation saying he hasn't had a BM in 13 days- active bowel sounds but low PO intake. Pt is currently resting in his bed that is locked and in its lowest position w/ his call light within reach, non-skid socks on, and bed alarm off d/t pt being independent. Pt denies any other needs at this time.

## 2021-02-26 NOTE — PROGRESS NOTES
Pt crying, requesting to speak with a  stating \"I am having crazy thoughts, I dont want a colostomy, I wont want to live anymore if I have to get one. \" STAT  consult placed at this time.

## 2021-02-26 NOTE — PROGRESS NOTES
77 yo w HTN, CKD and SMV and PV thrombosis since 2/2018, on Xarelto, admitted w h/o persistent abd pain and ischemic colitis in prox descending colon and splenic flexure confirmed by colonoscopy/Bx on 2/15/21, refractory to 2 courses of outpatient Abx's.     Clarisse Luna MD       (O) [phone]

## 2021-02-26 NOTE — PROGRESS NOTES
Regina Deleon is a 76 y.o. male patient.     Current Facility-Administered Medications   Medication Dose Route Frequency Provider Last Rate Last Admin    polyethylene glycol (GLYCOLAX) packet 17 g  17 g Oral Daily Nelia Gimenez MD        morphine (PF) injection 2 mg  2 mg Intravenous Q2H PRN Marco A Ibrahim MD        Or    morphine (PF) injection 4 mg  4 mg Intravenous Q2H PRN Marco A Ibrahim MD   4 mg at 02/26/21 0612    0.9 % sodium chloride infusion  1,000 mL Intravenous Continuous Bernadine Perez MD   Stopped at 02/24/21 2343    amLODIPine (NORVASC) tablet 5 mg  5 mg Oral BID Bernadine Perez MD   5 mg at 02/25/21 2058    fenofibrate (TRICOR) tablet 54 mg  54 mg Oral Daily Bernadine Perez MD   54 mg at 02/25/21 0908    HYDROcodone-acetaminophen (Dimas Knows) 5-325 MG per tablet 1 tablet  1 tablet Oral Q6H PRN Bernadine Perez MD   1 tablet at 02/26/21 0242    rivaroxaban (XARELTO) tablet 15 mg  15 mg Oral Daily with breakfast Bernadine Perez MD   Stopped at 02/25/21 0942    atorvastatin (LIPITOR) tablet 20 mg  20 mg Oral Daily Bernadine Perez MD   20 mg at 02/25/21 0908    budesonide (ENTOCORT EC) extended release capsule 3 mg  3 mg Oral Daily Bernadine Perez MD   3 mg at 02/25/21 0908    sodium chloride flush 0.9 % injection 10 mL  10 mL Intravenous 2 times per day Bernadine Perez MD   10 mL at 02/25/21 0908    sodium chloride flush 0.9 % injection 10 mL  10 mL Intravenous PRN Bernadine Perez MD        promethazine (PHENERGAN) tablet 12.5 mg  12.5 mg Oral Q6H PRN Bernadine Perez MD        Or    ondansetron (ZOFRAN) injection 4 mg  4 mg Intravenous Q6H PRN Bernadine Perez MD   4 mg at 02/26/21 0243    acetaminophen (TYLENOL) tablet 650 mg  650 mg Oral Q6H PRN Bernadine Perez MD   650 mg at 02/25/21 1851    Or    acetaminophen (TYLENOL) suppository 650 mg  650 mg Rectal Q6H PRN Bernadine Perez MD        0.9 % sodium chloride infusion   Intravenous Continuous Bernadine Perez MD 75 mL/hr at 02/26/21 0604 New Bag at 02/26/21 0604    ciprofloxacin (CIPRO) IVPB 400 mg  400 mg Intravenous Q12H Neftali Colin MD   Stopped at 02/25/21 2233    metronidazole (FLAGYL) 500 mg in NaCl 100 mL IVPB premix  500 mg Intravenous Q8H Neftali Colin MD   Stopped at 02/26/21 0704    pantoprazole (PROTONIX) injection 40 mg  40 mg Intravenous Daily Neftali Colin MD   40 mg at 02/25/21 0908     No Known Allergies  Active Problems:    Colitis  Resolved Problems:    * No resolved hospital problems. *    Blood pressure (!) 147/71, pulse 82, temperature 98.9 °F (37.2 °C), temperature source Oral, resp. rate 14, height 5' 11\" (1.803 m), weight 180 lb 9.6 oz (81.9 kg), SpO2 97 %. Subjective:  Symptoms:  Stable. Pain:  He complains of pain that is moderate. Objective:  General Appearance: In no acute distress. Vital signs: (most recent): Blood pressure (!) 147/71, pulse 82, temperature 98.9 °F (37.2 °C), temperature source Oral, resp. rate 14, height 5' 11\" (1.803 m), weight 180 lb 9.6 oz (81.9 kg), SpO2 97 %. Heart: Normal rate. Regular rhythm. S1 normal and S2 normal.    Abdomen: Abdomen is soft. Bowel sounds are normal.   There is left upper quadrant tenderness. Assessment & Plan  77 yo w HTN, CKD and SMV and PV thrombosis since 2/2018, on Xarelto, admitted w h/o persistent abd pain and ischemic colitis in prox descending colon and splenic flexure confirmed by colonoscopy/Bx on 2/15/21, refractory to 2 courses of outpatient Abx's. Outpatient stool PCR has been neg. Plan:   1. Supportive care  2. Agree w IV Abx's  3. Will try daily Miralax  4. Consider ultimate partial colectomy if colitis/pain persist  5. Appreciate Surgery's help  6.  Will follow    Risa Cevallos MD       (O) [phone]  Risa Cevallos MD  2/26/2021

## 2021-02-26 NOTE — CONSULTS
Infectious Diseases   Consult Note      Reason for Consult: non-resolving colitis   Requesting Physician:  Dr. Jenise Toth       Date of Admission: 2/24/2021  Subjective:   CHIEF COMPLAINT:   None given       HPI:    Ernestina Shaw is a 62KMC with history of HLD, HTN, SMV thrombosis on NOAC    Acute onset of LLQ pain ~ 1 month ago. Patient inconsistent with his dates. This pain and colonoscopy are mentioned in Cardiology note on 1/26/21 so presumably prior to that. He was referred to GI. Had a few course of cipro and flagyl. Had a colonscopy at some point (he says it was 2/15/21)  Fever to 103 for 3d after colonoscopy. Once he started cipro and flagyl the fever abated. He was prescribed bentyl and budesonide after colonoscopy   Budesonide exacerbated nausea and these were stopped. Results and path from colonoscopy not available. He was admitted 2/24/21 with refactory pain, N/V/Anorexia. He says no BM in 2 weeks  CT confirmed persistent L side colitis. No evidence of mesenteric/colic thrombosis. Admitted, continued on cipro and flagyl. +Low grade fever. He notes hematochezia for a few days after colonoscopy, otherwise none. He is , monogamous. Lived in [address] [place] [address] as a child, [place] for 45 years. Current abx:  cipro 400 IV q12  Flagyl 500 IV q8        Past Surgical History:       Diagnosis Date    Hx of blood clots     Hyperlipidemia     Hypertension     PONV (postoperative nausea and vomiting)     Symptomatic cholelithiasis     Thrombosis          Procedure Laterality Date    ANKLE SURGERY      left    CHOLECYSTECTOMY, LAPAROSCOPIC N/A 8/13/2020    LAPAROSCOPIC CHOLECYSTECTOMY WITH INTRAOPERATIVE CHOLANGIOGRAM performed by Yokasta Vu MD at [address] [place] [address]  03/08/2017    SHOULDER SURGERY Right 2012    TONSILLECTOMY      WRIST SURGERY      right       Social History:    TOBACCO:   reports that he has never smoked.  He has never used smokeless tobacco.  ETOH:   reports current alcohol use. There is no history of illicit drug use or other significant epidemiologic exposures.       Family History:       Problem Relation Age of Onset    Breast Cancer Mother     Coronary Art Dis Father     Diabetes Sister        Current Medications:    Current Facility-Administered Medications: polyethylene glycol (GLYCOLAX) packet 17 g, 17 g, Oral, Daily  morphine (PF) injection 2 mg, 2 mg, Intravenous, Q2H PRN **OR** morphine (PF) injection 4 mg, 4 mg, Intravenous, Q2H PRN  0.9 % sodium chloride infusion, 1,000 mL, Intravenous, Continuous  amLODIPine (NORVASC) tablet 5 mg, 5 mg, Oral, BID  fenofibrate (TRICOR) tablet 54 mg, 54 mg, Oral, Daily  HYDROcodone-acetaminophen (NORCO) 5-325 MG per tablet 1 tablet, 1 tablet, Oral, Q6H PRN  rivaroxaban (XARELTO) tablet 15 mg, 15 mg, Oral, Daily with breakfast  atorvastatin (LIPITOR) tablet 20 mg, 20 mg, Oral, Daily  budesonide (ENTOCORT EC) extended release capsule 3 mg, 3 mg, Oral, Daily  sodium chloride flush 0.9 % injection 10 mL, 10 mL, Intravenous, 2 times per day  sodium chloride flush 0.9 % injection 10 mL, 10 mL, Intravenous, PRN  promethazine (PHENERGAN) tablet 12.5 mg, 12.5 mg, Oral, Q6H PRN **OR** ondansetron (ZOFRAN) injection 4 mg, 4 mg, Intravenous, Q6H PRN  acetaminophen (TYLENOL) tablet 650 mg, 650 mg, Oral, Q6H PRN **OR** acetaminophen (TYLENOL) suppository 650 mg, 650 mg, Rectal, Q6H PRN  0.9 % sodium chloride infusion, , Intravenous, Continuous  pantoprazole (PROTONIX) injection 40 mg, 40 mg, Intravenous, Daily      No Known Allergies       REVIEW OF SYSTEMS:    CONSTITUTIONAL:   Per HPI   EYES:  negative for blurred vision, eye discharge, visual disturbance and icterus  HEENT:  negative for acute hearing loss, tinnitus, ear drainage, sinus pressure, nasal congestion, epistaxis and snoring  RESPIRATORY:  No cough, shortness of breath, hemoptysis  CARDIOVASCULAR:  negative for chest pain, palpitations, exertional chest pressure/discomfort, edema, syncope  GASTROINTESTINAL:   Per HPI   GENITOURINARY:  negative for frequency, dysuria, urinary incontinence, decreased urine volume, and hematuria  HEMATOLOGIC/LYMPHATIC:  negative for easy bruising, bleeding and lymphadenopathy  ALLERGIC/IMMUNOLOGIC:  negative for recurrent infections, angioedema, anaphylaxis and drug reactions  ENDOCRINE:  negative for weight changes and diabetic symptoms including polyuria, polydipsia and polyphagia  MUSCULOSKELETAL:  negative for acute joint swelling, decreased range of motion and muscle weakness  NEUROLOGICAL:  negative for headaches, slurred speech, unilateral weakness  PSYCHIATRIC/BEHAVIORAL: negative for hallucinations, behavioral problems, confusion and agitation. Objective:   PHYSICAL EXAM:      VITALS:  BP (!) 169/80   Pulse 73   Temp 99.1 °F (37.3 °C) (Oral)   Resp 14   Ht 5' 11\" (1.803 m)   Wt 180 lb 9.6 oz (81.9 kg)   SpO2 96%   BMI 25.19 kg/m²      24HR INTAKE/OUTPUT:      Intake/Output Summary (Last 24 hours) at 2/26/2021 1143  Last data filed at 2/26/2021 3029  Gross per 24 hour   Intake 3212.5 ml   Output 125 ml   Net 3087.5 ml     CONSTITUTIONAL:  Awake, alert, cooperative, no apparent distress, and appears stated age  [de-identified]: NCAT, PERRL, EOMI. Sclera white, conjunctiva full. OP with moist mucosal membranes, no thrush, tongue protrudes midline  NECK:  Supple, symmetrical, trachea midline, no adenopathy  LUNGS:  no increased work of breathing, CTA anshu without W/R/R  CARDIOVASCULAR:  RRR with murmur LSB   ABDOMEN:  hyperactive bowel sounds, tender, slightly distended  PSYCHIATRIC: Oriented to person place and time. No obvious depression or anxiety. MUSCULOSKELETAL: No obvious misalignment or effusion of the joints. No clubbing, cyanosis of the digits. SKIN:  normal skin color, texture, turgor and no redness, warmth, or swelling.  No palpable nodules or stigmata of embolic phenomenon  NEUROLOGIC: nonfocal exam  ACCESS:   IV site ok       DATA:    Old records have been reviewed    CBC:  Recent Labs     02/24/21  1344 02/25/21  0529 02/26/21  0536   WBC 13.0* 12.2* 10.6   RBC 5.12 4.85 4.52   HGB 13.7 13.1* 12.4*   HCT 42.1 40.0* 37.2*    185 185   MCV 82.2 82.5 82.3   MCH 26.8 27.0 27.4   MCHC 32.6 32.7 33.2   RDW 13.9 13.8 13.8      BMP:  Recent Labs     02/24/21  1344 02/25/21  0530 02/26/21  0536   * 133* 136   K 4.2 4.3 4.0   CL 95* 98* 100   CO2 25 25 28   BUN 20 19 18   CREATININE 1.1 1.1 1.1   CALCIUM 9.1 8.6 8.6   GLUCOSE 142* 134* 123*        Radiology Review:  All pertinent images / reports were reviewed as a part of this visit. CTA a/p 2/24/21   Impression   Colitis of the descending colon.  The mesenteric and colic arteries are   patent, and there are no secondary findings of ischemia such as pneumatosis,   mesenteric/portal venous gas, or pneumoperitoneum     CT a/p 2/21/21  Impression   Slightly increased severity of colitis involving the descending colon which   is most likely infectious or inflammatory, less likely ischemic.  No evidence   of complication of the colitis. CT a/p 2/6/21  Impression   Findings compatible with nonspecific infectious/inflammatory colitis   involving distal splenic flexure through distal descending colon.  No   evidence for bowel obstruction, perforation or abscess formation. Assessment:     Patient Active Problem List   Diagnosis    Mesenteric vein thrombosis (HCC)    Essential hypertension    Mixed hyperlipidemia    Chronic abdominal pain    Stage 3 chronic kidney disease    Symptomatic cholelithiasis    Murmur    Colitis       Focal descending colitis, acute onset ~5 weeks ago   Not improved with cipro/flagyl/budesonide  Etiology presumed infectious. Appears not to be ischemic. Inflammatory etiology possible, do not have access to endoscopic report/path.     -DC cipro and flagyl   -Zosyn  -check CMV PCR   -review case with GI     If he responds to IV abx can certainly arrange for continued IV abx after DC. Defer to GI as to whether inflammatory etiology is adequately excluded  Continue budesonide? May require partial colectomy if he fails to improve      I will follow-up with him on Monday  Please call or PS with questions over the weekend    Case d/w Kristofer Osei M.D. Thank you for the opportunity to participate in the care of your patient.     Please do not hesitate to contact me:   [phone] office  [phone] mobile

## 2021-02-26 NOTE — PROGRESS NOTES
Physician Progress Note      PATIENT:               Wood Graft  CSN #:                  [id number]  :                       1955  ADMIT DATE:       2021 1:28 PM  [address] [place] DATE:  RESPONDING  PROVIDER #:        Benjie Fuller MD          QUERY TEXT:    Pt admitted with colitis and has mesenteric vein thrombosis documented. If   possible, please document in progress notes and discharge summary further   specificity regarding the mesenteric vein thrombosis: The medical record reflects the following:  Risk Factors: Mesenteric vein thrombosis  Clinical Indicators: ED note 21-  He is on Xarelto due to blood clot in   abdomen 6 months ago. He sees Dr. Alpesh France for hematology. 20 CT abdomen -    New subocclusive superior mesenteric vein thrombus. Treatment: Xarelto, monitor images    Thank You Gabriela Mario RN, [email or web address]  Options provided:  -- Acute mesenteric vein thrombosis present on admission  -- Chronic mesenteric vein thrombosis present on admission  -- History of mesenteric vein thrombosis  -- Other - I will add my own diagnosis  -- Disagree - Not applicable / Not valid  -- Disagree - Clinically unable to determine / Unknown  -- Refer to Clinical Documentation Reviewer    PROVIDER RESPONSE TEXT:    Acute mesenteric vein thrombosis was present on admission.     Query created by: Atif Lynn on 2021 8:43 AM      Electronically signed by:  Benjie Fuller MD 2021 1:10 PM

## 2021-02-26 NOTE — CARE COORDINATION
Chart reviewed day 2. Care managed per surgery, ID and IM. Continuing work up of cause. Will follow for possible surgical intervention vs possible IVATBX.  Sonam Dominique RN

## 2021-02-26 NOTE — PROGRESS NOTES
Hospitalist Progress Note      PCP: Sarahy BERNAL DO    Date of Admission: 2/24/2021    Chief Complaint: abdominal pain    Hospital Course: 76 y.o. male who presented to [facility] with above complaint. He has been having abdominal pain for last 1 month. He has been seeing GI and had EGD and colonoscopy in the recent past.  His symptom does not go away and apparently the symptoms worsen. Thus why he came to the emergency room. He does have some nausea no diarrhea. He told he did not move his bowels for last 7 to 10 days. No abdominal distention fever change in mental status chest pain cough sputum or any focal neurological symptoms. Was seen in the emergency room 2/23 and was discharged on Cipro and Flagyl. Subjective: Pt still with abdominal pain. Diffuse. Not hungry. Passing gas but no stool. Pain medication not helping much.     Medications:  Reviewed    Infusion Medications    sodium chloride Stopped (02/24/21 3453)    sodium chloride 75 mL/hr at 02/25/21 1410     Scheduled Medications    amLODIPine  5 mg Oral BID    fenofibrate  54 mg Oral Daily    rivaroxaban  15 mg Oral Daily with breakfast    atorvastatin  20 mg Oral Daily    budesonide  3 mg Oral Daily    sodium chloride flush  10 mL Intravenous 2 times per day    ciprofloxacin  400 mg Intravenous Q12H    metroNIDAZOLE  500 mg Intravenous Q8H    pantoprazole  40 mg Intravenous Daily     PRN Meds: morphine **OR** morphine, HYDROcodone-acetaminophen, sodium chloride flush, promethazine **OR** ondansetron, acetaminophen **OR** acetaminophen      Intake/Output Summary (Last 24 hours) at 2/25/2021 1907  Last data filed at 2/25/2021 1528  Gross per 24 hour   Intake 1692 ml   Output 0 ml   Net 1692 ml       Physical Exam Performed:    BP (!) 160/76   Pulse 72   Temp 100.7 °F (38.2 °C)   Resp 13   Ht 5' 11\" (1.803 m)   Wt 180 lb 9.6 oz (81.9 kg)   SpO2 97%   BMI 25.19 kg/m²     General appearance: No apparent distress, appears stated age and cooperative. HEENT: Pupils equal, round, and reactive to light. Conjunctivae/corneas clear. Neck: Supple, with full range of motion. No jugular venous distention. Trachea midline. Respiratory:  Normal respiratory effort. Clear to auscultation, bilaterally without Rales/Wheezes/Rhonchi. Cardiovascular: Regular rate and rhythm with normal S1/S2 without murmurs, rubs or gallops. Abdomen: Soft, tender, mildly distended with hyperactive bowel sounds. Musculoskeletal: No clubbing, cyanosis or edema bilaterally. Full range of motion without deformity. Skin: Skin color, texture, turgor normal.  No rashes or lesions. Neurologic:  Neurovascularly intact without any focal sensory/motor deficits. Cranial nerves: II-XII intact, grossly non-focal.  Psychiatric: Alert and oriented, thought content appropriate, normal insight  Capillary Refill: Brisk,< 3 seconds   Peripheral Pulses: +2 palpable, equal bilaterally       Labs:   Recent Labs     02/23/21  1627 02/24/21  1344 02/25/21  0529   WBC 9.7 13.0* 12.2*   HGB 14.1 13.7 13.1*   HCT 43.0 42.1 40.0*    218 185     Recent Labs     02/23/21  1627 02/24/21  1344 02/25/21  0530   * 133* 133*   K 4.0 4.2 4.3   CL 92* 95* 98*   CO2 27 25 25   BUN 20 20 19   CREATININE 1.3 1.1 1.1   CALCIUM 9.5 9.1 8.6     Recent Labs     02/23/21  1627 02/24/21  1344   AST 16 15   ALT 15 15   BILITOT 0.6 0.7   ALKPHOS 50 57     No results for input(s): INR in the last 72 hours. No results for input(s): Cherene Tejada in the last 72 hours. Urinalysis:      Lab Results   Component Value Date    NITRU Negative 02/24/2021    WBCUA 0-2 02/24/2021    BACTERIA Rare 02/24/2021    RBCUA 0-2 02/24/2021    BLOODU Negative 02/24/2021    SPECGRAV >=1.030 02/24/2021    GLUCOSEU Negative 02/24/2021       Radiology:  CTA ABDOMEN PELVIS W CONTRAST   Final Result   Colitis of the descending colon.   The mesenteric and colic arteries are   patent, and there are no secondary findings of ischemia such as pneumatosis,   mesenteric/portal venous gas, or pneumoperitoneum                 Assessment/Plan:    Active Hospital Problems    Diagnosis    Colitis [K52.9]     Abdominal pain -descending colitis -  liquid diet, IV fluids, continue Cipro and Flagyl, GI evaluation, GI was contacted from the emergency room and advised to get surgical evaluation. Surgery consult, IV Protonix  Continue with Entocort.  Morphine prn.     Hypertension-blood pressure is mildly elevated, continue home medications Norvasc.     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

## 2021-02-27 PROCEDURE — 6360000002 HC RX W HCPCS: Performed by: FAMILY MEDICINE

## 2021-02-27 PROCEDURE — 2580000003 HC RX 258: Performed by: INTERNAL MEDICINE

## 2021-02-27 PROCEDURE — 6370000000 HC RX 637 (ALT 250 FOR IP): Performed by: NURSE PRACTITIONER

## 2021-02-27 PROCEDURE — 99231 SBSQ HOSP IP/OBS SF/LOW 25: CPT | Performed by: SURGERY

## 2021-02-27 PROCEDURE — C9113 INJ PANTOPRAZOLE SODIUM, VIA: HCPCS | Performed by: INTERNAL MEDICINE

## 2021-02-27 PROCEDURE — 6360000002 HC RX W HCPCS: Performed by: INTERNAL MEDICINE

## 2021-02-27 PROCEDURE — 1200000000 HC SEMI PRIVATE

## 2021-02-27 PROCEDURE — 6370000000 HC RX 637 (ALT 250 FOR IP): Performed by: INTERNAL MEDICINE

## 2021-02-27 RX ORDER — MESALAMINE 400 MG/1
800 CAPSULE, DELAYED RELEASE ORAL 3 TIMES DAILY
Status: DISCONTINUED | OUTPATIENT
Start: 2021-02-27 | End: 2021-03-04 | Stop reason: HOSPADM

## 2021-02-27 RX ORDER — MECOBALAMIN 5000 MCG
5 TABLET,DISINTEGRATING ORAL NIGHTLY PRN
Status: DISCONTINUED | OUTPATIENT
Start: 2021-02-27 | End: 2021-03-04 | Stop reason: HOSPADM

## 2021-02-27 RX ADMIN — SODIUM CHLORIDE: 9 INJECTION, SOLUTION INTRAVENOUS at 11:21

## 2021-02-27 RX ADMIN — MORPHINE SULFATE 4 MG: 4 INJECTION, SOLUTION INTRAMUSCULAR; INTRAVENOUS at 22:52

## 2021-02-27 RX ADMIN — MORPHINE SULFATE 4 MG: 4 INJECTION, SOLUTION INTRAMUSCULAR; INTRAVENOUS at 20:51

## 2021-02-27 RX ADMIN — POLYETHYLENE GLYCOL 3350 17 G: 17 POWDER, FOR SOLUTION ORAL at 09:07

## 2021-02-27 RX ADMIN — SODIUM CHLORIDE, PRESERVATIVE FREE 10 ML: 5 INJECTION INTRAVENOUS at 09:12

## 2021-02-27 RX ADMIN — PIPERACILLIN SODIUM AND TAZOBACTAM SODIUM 4500 MG: 4; .5 INJECTION, POWDER, LYOPHILIZED, FOR SOLUTION INTRAVENOUS at 12:54

## 2021-02-27 RX ADMIN — PIPERACILLIN SODIUM AND TAZOBACTAM SODIUM 4500 MG: 4; .5 INJECTION, POWDER, LYOPHILIZED, FOR SOLUTION INTRAVENOUS at 20:39

## 2021-02-27 RX ADMIN — AMLODIPINE BESYLATE 5 MG: 5 TABLET ORAL at 09:07

## 2021-02-27 RX ADMIN — Medication 5 MG: at 22:52

## 2021-02-27 RX ADMIN — MORPHINE SULFATE 4 MG: 4 INJECTION, SOLUTION INTRAMUSCULAR; INTRAVENOUS at 04:27

## 2021-02-27 RX ADMIN — MESALAMINE 800 MG: 400 CAPSULE, DELAYED RELEASE ORAL at 20:35

## 2021-02-27 RX ADMIN — MORPHINE SULFATE 4 MG: 4 INJECTION, SOLUTION INTRAMUSCULAR; INTRAVENOUS at 18:51

## 2021-02-27 RX ADMIN — ATORVASTATIN CALCIUM 20 MG: 10 TABLET, FILM COATED ORAL at 09:07

## 2021-02-27 RX ADMIN — MESALAMINE 800 MG: 400 CAPSULE, DELAYED RELEASE ORAL at 14:08

## 2021-02-27 RX ADMIN — FENOFIBRATE 54 MG: 54 TABLET ORAL at 09:07

## 2021-02-27 RX ADMIN — MESALAMINE 800 MG: 400 CAPSULE, DELAYED RELEASE ORAL at 11:21

## 2021-02-27 RX ADMIN — AMLODIPINE BESYLATE 5 MG: 5 TABLET ORAL at 20:35

## 2021-02-27 RX ADMIN — PIPERACILLIN SODIUM AND TAZOBACTAM SODIUM 4500 MG: 4; .5 INJECTION, POWDER, LYOPHILIZED, FOR SOLUTION INTRAVENOUS at 04:28

## 2021-02-27 RX ADMIN — SODIUM CHLORIDE: 9 INJECTION, SOLUTION INTRAVENOUS at 22:54

## 2021-02-27 RX ADMIN — MORPHINE SULFATE 4 MG: 4 INJECTION, SOLUTION INTRAMUSCULAR; INTRAVENOUS at 09:07

## 2021-02-27 RX ADMIN — BUDESONIDE 3 MG: 3 CAPSULE ORAL at 09:07

## 2021-02-27 RX ADMIN — MORPHINE SULFATE 4 MG: 4 INJECTION, SOLUTION INTRAMUSCULAR; INTRAVENOUS at 16:18

## 2021-02-27 RX ADMIN — PANTOPRAZOLE SODIUM 40 MG: 40 INJECTION, POWDER, FOR SOLUTION INTRAVENOUS at 09:07

## 2021-02-27 RX ADMIN — MORPHINE SULFATE 4 MG: 4 INJECTION, SOLUTION INTRAMUSCULAR; INTRAVENOUS at 12:53

## 2021-02-27 RX ADMIN — SODIUM CHLORIDE, PRESERVATIVE FREE 10 ML: 5 INJECTION INTRAVENOUS at 01:47

## 2021-02-27 ASSESSMENT — PAIN SCALES - GENERAL
PAINLEVEL_OUTOF10: 8
PAINLEVEL_OUTOF10: 6
PAINLEVEL_OUTOF10: 5
PAINLEVEL_OUTOF10: 7
PAINLEVEL_OUTOF10: 5

## 2021-02-27 NOTE — FLOWSHEET NOTE
Consult; pt shared some stories about his ill health. He said he has been in pain since 37 days and has not bee sleeping well. He said he has reached his breaking point. Hopes that his pain and ill health are gone. He is supported by his spouse. Prayer was helpful.  offered prayer at bedside. 02/27/21 5154   Encounter Summary   Services provided to: Patient   Referral/Consult From: Nurse   Support System Spouse; Family members   Continue Visiting Yes  (2/27 Emotional support and prayer)   Complexity of Encounter Moderate   Length of Encounter 45 minutes   Grief and Life Adjustment   Type Adjustment to illness   Assessment Tearful; Hopeful   Intervention Active listening;Explored feelings, thoughts, concerns;Prayer;Sustaining presence/ Ministry of presence; Discussed relationship with God;Discussed belief system/Lutheran practices/kapil   Outcome Expressed gratitude;Engaged in conversation; Shared life review; Tearful;Encouraged

## 2021-02-27 NOTE — PROGRESS NOTES
[facility] SURGERY    PATIENT NAME: Chase Mitchell     TODAY'S DATE: 2/27/2021    CHIEF COMPLAINT: abd pain    INTERVAL HISTORY/HPI:    Pt with some left sided pain, having flatus, no fever sor hcills. REVIEW OF SYSTEMS:  Pertinent positives and negatives as per interval history section    OBJECTIVE:  VITALS:  BP (!) 162/82   Pulse 66   Temp 99.7 °F (37.6 °C) (Oral)   Resp 16   Ht 5' 11\" (1.803 m)   Wt 180 lb 9.6 oz (81.9 kg)   SpO2 94%   BMI 25.19 kg/m²     INTAKE/OUTPUT:    I/O last 3 completed shifts: In: 1567.5 [P.O.:360; I.V.:1007.5; IV Piggyback:200]  Out: -   No intake/output data recorded. CONSTITUTIONAL:  awake and alert  LUNGS:  Respirations easy and unlabored  CARD:  regular rate and rhythm  ABDOMEN:  normal bowel sounds, soft, non-distended, tender LLQ     Data:  CBC:   Recent Labs     02/25/21  0529 02/26/21  0536   WBC 12.2* 10.6   HGB 13.1* 12.4*   HCT 40.0* 37.2*    185     BMP:    Recent Labs     02/25/21  0530 02/26/21  0536   * 136   K 4.3 4.0   CL 98* 100   CO2 25 28   BUN 19 18   CREATININE 1.1 1.1   GLUCOSE 134* 123*     Hepatic:   Recent Labs     02/26/21  0536   AST 14*   ALT 14   BILITOT 0.6   ALKPHOS 61     Mag:    No results for input(s): MG in the last 72 hours. Phos:   No results for input(s): PHOS in the last 72 hours. INR: No results for input(s): INR in the last 72 hours. Radiology Review:  *Imaging personally reviewed by me. NA      ASSESSMENT AND PLAN:  75 yo with descending colitis  1. ID input appreciated - continue abx  2.   Diet advanced today     Electronically signed by Shari Davis, [address]

## 2021-02-27 NOTE — PROGRESS NOTES
Hospitalist Progress Note      PCP: [facility] DO DOLORES    Date of Admission: 2/24/2021    Chief Complaint: abdominal pain    Hospital Course: 76 y.o. male who presented to [facility] with above complaint. He has been having abdominal pain for last 1 month. He has been seeing GI and had EGD and colonoscopy in the recent past.  His symptom does not go away and apparently the symptoms worsen. Thus why he came to the emergency room. He does have some nausea no diarrhea. He told he did not move his bowels for last 7 to 10 days. No abdominal distention fever change in mental status chest pain cough sputum or any focal neurological symptoms. Was seen in the emergency room 2/23 and was discharged on Cipro and Flagyl. Subjective: Pt still with abdominal pain. The same as yesterday. Maykel clears. No BM.      Medications:  Reviewed    Infusion Medications    sodium chloride Stopped (02/24/21 2343)    sodium chloride 75 mL/hr at 02/26/21 1833     Scheduled Medications    polyethylene glycol  17 g Oral Daily    piperacillin-tazobactam  4,500 mg Intravenous Q8H    amLODIPine  5 mg Oral BID    fenofibrate  54 mg Oral Daily    rivaroxaban  15 mg Oral Daily with breakfast    atorvastatin  20 mg Oral Daily    budesonide  3 mg Oral Daily    sodium chloride flush  10 mL Intravenous 2 times per day    pantoprazole  40 mg Intravenous Daily     PRN Meds: morphine **OR** morphine, HYDROcodone-acetaminophen, sodium chloride flush, promethazine **OR** ondansetron, acetaminophen **OR** acetaminophen      Intake/Output Summary (Last 24 hours) at 2/26/2021 2303  Last data filed at 2/26/2021 1932  Gross per 24 hour   Intake 2340.5 ml   Output 125 ml   Net 2215.5 ml       Physical Exam Performed:    BP (!) 170/80   Pulse 75   Temp 98.9 °F (37.2 °C) (Oral)   Resp 13   Ht 5' 11\" (1.803 m)   Wt 180 lb 9.6 oz (81.9 kg)   SpO2 94%   BMI 25.19 kg/m²     General appearance: No apparent distress, appears stated age and cooperative. HEENT: Pupils equal, round, and reactive to light. Conjunctivae/corneas clear. Neck: Supple, with full range of motion. No jugular venous distention. Trachea midline. Respiratory:  Normal respiratory effort. Clear to auscultation, bilaterally without Rales/Wheezes/Rhonchi. Cardiovascular: Regular rate and rhythm with normal S1/S2 without murmurs, rubs or gallops. Abdomen: Soft, tender, mildly distended with hyperactive bowel sounds. Musculoskeletal: No clubbing, cyanosis or edema bilaterally. Full range of motion without deformity. Skin: Skin color, texture, turgor normal.  No rashes or lesions. Neurologic:  Neurovascularly intact without any focal sensory/motor deficits. Cranial nerves: II-XII intact, grossly non-focal.  Psychiatric: Alert and oriented, thought content appropriate, normal insight  Capillary Refill: Brisk,< 3 seconds   Peripheral Pulses: +2 palpable, equal bilaterally       Labs:   Recent Labs     02/24/21  1344 02/25/21  0529 02/26/21  0536   WBC 13.0* 12.2* 10.6   HGB 13.7 13.1* 12.4*   HCT 42.1 40.0* 37.2*    185 185     Recent Labs     02/24/21  1344 02/25/21  0530 02/26/21  0536   * 133* 136   K 4.2 4.3 4.0   CL 95* 98* 100   CO2 25 25 28   BUN 20 19 18   CREATININE 1.1 1.1 1.1   CALCIUM 9.1 8.6 8.6     Recent Labs     02/24/21  1344 02/26/21  0536   AST 15 14*   ALT 15 14   BILITOT 0.7 0.6   ALKPHOS 57 61     No results for input(s): INR in the last 72 hours. No results for input(s): [place] in the last 72 hours. Urinalysis:      Lab Results   Component Value Date    NITRU Negative 02/24/2021    WBCUA 0-2 02/24/2021    BACTERIA Rare 02/24/2021    RBCUA 0-2 02/24/2021    BLOODU Negative 02/24/2021    SPECGRAV >=1.030 02/24/2021    GLUCOSEU Negative 02/24/2021       Radiology:  CTA ABDOMEN PELVIS W CONTRAST   Final Result   Colitis of the descending colon.   The mesenteric and colic arteries are   patent, and there are no secondary findings of

## 2021-02-27 NOTE — PROGRESS NOTES
Alpesh Rocha is a 76 y.o. male patient.     Current Facility-Administered Medications   Medication Dose Route Frequency Provider Last Rate Last Admin    mesalamine (DELZICOL) delayed release capsule 800 mg  800 mg Oral TID Houston Wood MD        polyethylene glycol (GLYCOLAX) packet 17 g  17 g Oral Daily Houston Wood MD   17 g at 02/27/21 0907    piperacillin-tazobactam (ZOSYN) 4,500 mg in dextrose 5 % 100 mL IVPB (mini-bag)  4,500 mg Intravenous Q8H Pearl Bangura MD   Stopped at 02/27/21 5304    morphine (PF) injection 2 mg  2 mg Intravenous Q2H PRN Ephraim Friedman MD   2 mg at 02/26/21 1555    Or    morphine (PF) injection 4 mg  4 mg Intravenous Q2H PRN Ephraim Friedman MD   4 mg at 02/27/21 0907    0.9 % sodium chloride infusion  1,000 mL Intravenous Continuous Rosa Maguire MD   Stopped at 02/24/21 2343    amLODIPine (NORVASC) tablet 5 mg  5 mg Oral BID Rosa Maguire MD   5 mg at 02/27/21 0443    fenofibrate (TRICOR) tablet 54 mg  54 mg Oral Daily Rosa Maguire MD   54 mg at 02/27/21 0907    HYDROcodone-acetaminophen (NORCO) 5-325 MG per tablet 1 tablet  1 tablet Oral Q6H PRN Rosa Maguire MD   1 tablet at 02/26/21 1643    rivaroxaban (XARELTO) tablet 15 mg  15 mg Oral Daily with breakfast Rosa Maguire MD   Stopped at 02/25/21 0907    atorvastatin (LIPITOR) tablet 20 mg  20 mg Oral Daily Rosa Maguire MD   20 mg at 02/27/21 0907    budesonide (ENTOCORT EC) extended release capsule 3 mg  3 mg Oral Daily Rosa Maguire MD   3 mg at 02/27/21 9696    sodium chloride flush 0.9 % injection 10 mL  10 mL Intravenous 2 times per day Rosa Maguire MD   10 mL at 02/27/21 0912    sodium chloride flush 0.9 % injection 10 mL  10 mL Intravenous PRN Rosa Maguire MD        promethazine (PHENERGAN) tablet 12.5 mg  12.5 mg Oral Q6H PRN Rosa Maguire MD        Or    ondansetron (ZOFRAN) injection 4 mg  4 mg Intravenous Q6H PRN Rosa Maguire MD   4 mg at 02/26/21 1494    acetaminophen (TYLENOL) tablet 650 mg  650 mg Oral Q6H PRN Kortney Bhatia MD   650 mg at 02/25/21 1851    Or    acetaminophen (TYLENOL) suppository 650 mg  650 mg Rectal Q6H PRN Kortney Bhatia MD        0.9 % sodium chloride infusion   Intravenous Continuous Kortney Bhatia MD 75 mL/hr at 02/26/21 1833 New Bag at 02/26/21 1833    pantoprazole (PROTONIX) injection 40 mg  40 mg Intravenous Daily Kortney Bhatia MD   40 mg at 02/27/21 0907     No Known Allergies  Active Problems:    Colitis  Resolved Problems:    * No resolved hospital problems. *    Blood pressure (!) 162/82, pulse 66, temperature 99.7 °F (37.6 °C), temperature source Oral, resp. rate 16, height 5' 11\" (1.803 m), weight 180 lb 9.6 oz (81.9 kg), SpO2 94 %. Subjective:  Symptoms:  Stable. Pain:  He complains of pain that is moderate. Objective:  General Appearance: In no acute distress. Vital signs: (most recent): Blood pressure (!) 162/82, pulse 66, temperature 99.7 °F (37.6 °C), temperature source Oral, resp. rate 16, height 5' 11\" (1.803 m), weight 180 lb 9.6 oz (81.9 kg), SpO2 94 %. Heart: Normal rate. Regular rhythm. S1 normal and S2 normal.    Abdomen: Abdomen is soft. Bowel sounds are normal.   There is left upper quadrant tenderness. Assessment & Plan  75 yo w HTN, CKD and SMV and PV thrombosis since 2/2018, on Xarelto, admitted w h/o persistent abd pain and ischemic colitis in prox descending colon and splenic flexure confirmed by colonoscopy/Bx on 2/15/21, refractory to 2 courses of outpatient Abx's. Outpatient stool PCR has been neg.     Plan:   1. Supportive care  2. Agree w IV Abx's  3. Will try daily Miralax and PO Mesalamine (he is on Entocort 3 mg daily started by the primary team)  4. Will advance to full liquid diet  5. Consider ultimate partial colectomy if colitis/pain persist  6. Appreciate Surgery's help  7.  Will follow     Nura Lema MD       ([phone]) [phone]    Nura Lema, MD  2/27/2021

## 2021-02-27 NOTE — PROGRESS NOTES
texture, turgor normal.  No rashes or lesions. Neurologic:  Neurovascularly intact without any focal sensory/motor deficits. Cranial nerves: II-XII intact, grossly non-focal.  Psychiatric: Alert and oriented, thought content appropriate, normal insight  Capillary Refill: Brisk,< 3 seconds   Peripheral Pulses: +2 palpable, equal bilaterally       Labs:   Recent Labs     02/24/21  1344 02/25/21  0529 02/26/21  0536   WBC 13.0* 12.2* 10.6   HGB 13.7 13.1* 12.4*   HCT 42.1 40.0* 37.2*    185 185     Recent Labs     02/24/21  1344 02/25/21  0530 02/26/21  0536   * 133* 136   K 4.2 4.3 4.0   CL 95* 98* 100   CO2 25 25 28   BUN 20 19 18   CREATININE 1.1 1.1 1.1   CALCIUM 9.1 8.6 8.6     Recent Labs     02/24/21  1344 02/26/21  0536   AST 15 14*   ALT 15 14   BILITOT 0.7 0.6   ALKPHOS 57 61     No results for input(s): INR in the last 72 hours. No results for input(s): [place] in the last 72 hours. Assessment/Plan:    Persistent abdominal pain due to descending colitis-recent C-scope with biopsy showed ischemic colitis-no improvement with 2 courses of antibiotics outpatient including Cipro and Flagyl. changed to zosyn--Continue with Entocort-mesalamine added. Morphine prn. .. ID, GI and general surgery following--general surgery recommends partial colectomy if colitis/pain persists     -Hypertension-suboptimal control of pain.   Continue  Norvasc.     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

## 2021-02-28 PROCEDURE — 6360000002 HC RX W HCPCS: Performed by: INTERNAL MEDICINE

## 2021-02-28 PROCEDURE — 2580000003 HC RX 258: Performed by: INTERNAL MEDICINE

## 2021-02-28 PROCEDURE — 6360000002 HC RX W HCPCS: Performed by: FAMILY MEDICINE

## 2021-02-28 PROCEDURE — 6370000000 HC RX 637 (ALT 250 FOR IP): Performed by: HOSPITALIST

## 2021-02-28 PROCEDURE — 6370000000 HC RX 637 (ALT 250 FOR IP): Performed by: INTERNAL MEDICINE

## 2021-02-28 PROCEDURE — C9113 INJ PANTOPRAZOLE SODIUM, VIA: HCPCS | Performed by: INTERNAL MEDICINE

## 2021-02-28 PROCEDURE — 99231 SBSQ HOSP IP/OBS SF/LOW 25: CPT | Performed by: SURGERY

## 2021-02-28 PROCEDURE — 1200000000 HC SEMI PRIVATE

## 2021-02-28 RX ORDER — POLYETHYLENE GLYCOL 3350 17 G/17G
17 POWDER, FOR SOLUTION ORAL 2 TIMES DAILY
Status: DISCONTINUED | OUTPATIENT
Start: 2021-02-28 | End: 2021-03-01

## 2021-02-28 RX ADMIN — PIPERACILLIN SODIUM AND TAZOBACTAM SODIUM 4500 MG: 4; .5 INJECTION, POWDER, LYOPHILIZED, FOR SOLUTION INTRAVENOUS at 20:50

## 2021-02-28 RX ADMIN — PANTOPRAZOLE SODIUM 40 MG: 40 INJECTION, POWDER, FOR SOLUTION INTRAVENOUS at 09:35

## 2021-02-28 RX ADMIN — MORPHINE SULFATE 4 MG: 4 INJECTION, SOLUTION INTRAMUSCULAR; INTRAVENOUS at 09:35

## 2021-02-28 RX ADMIN — AMLODIPINE BESYLATE 5 MG: 5 TABLET ORAL at 09:35

## 2021-02-28 RX ADMIN — POLYETHYLENE GLYCOL 3350 17 G: 17 POWDER, FOR SOLUTION ORAL at 09:35

## 2021-02-28 RX ADMIN — MESALAMINE 800 MG: 400 CAPSULE, DELAYED RELEASE ORAL at 09:35

## 2021-02-28 RX ADMIN — MORPHINE SULFATE 4 MG: 4 INJECTION, SOLUTION INTRAMUSCULAR; INTRAVENOUS at 13:41

## 2021-02-28 RX ADMIN — AMLODIPINE BESYLATE 5 MG: 5 TABLET ORAL at 20:48

## 2021-02-28 RX ADMIN — PIPERACILLIN SODIUM AND TAZOBACTAM SODIUM 4500 MG: 4; .5 INJECTION, POWDER, LYOPHILIZED, FOR SOLUTION INTRAVENOUS at 05:19

## 2021-02-28 RX ADMIN — FENOFIBRATE 54 MG: 54 TABLET ORAL at 09:35

## 2021-02-28 RX ADMIN — ATORVASTATIN CALCIUM 20 MG: 10 TABLET, FILM COATED ORAL at 09:35

## 2021-02-28 RX ADMIN — ONDANSETRON HYDROCHLORIDE 4 MG: 2 SOLUTION INTRAMUSCULAR; INTRAVENOUS at 20:48

## 2021-02-28 RX ADMIN — MESALAMINE 800 MG: 400 CAPSULE, DELAYED RELEASE ORAL at 13:38

## 2021-02-28 RX ADMIN — SODIUM CHLORIDE, PRESERVATIVE FREE 10 ML: 5 INJECTION INTRAVENOUS at 09:39

## 2021-02-28 RX ADMIN — PIPERACILLIN SODIUM AND TAZOBACTAM SODIUM 4500 MG: 4; .5 INJECTION, POWDER, LYOPHILIZED, FOR SOLUTION INTRAVENOUS at 13:38

## 2021-02-28 RX ADMIN — MORPHINE SULFATE 4 MG: 4 INJECTION, SOLUTION INTRAMUSCULAR; INTRAVENOUS at 20:49

## 2021-02-28 RX ADMIN — MESALAMINE 800 MG: 400 CAPSULE, DELAYED RELEASE ORAL at 20:44

## 2021-02-28 RX ADMIN — HYDROCODONE BITARTRATE AND ACETAMINOPHEN 1 TABLET: 5; 325 TABLET ORAL at 02:16

## 2021-02-28 RX ADMIN — SODIUM CHLORIDE: 9 INJECTION, SOLUTION INTRAVENOUS at 09:35

## 2021-02-28 RX ADMIN — MORPHINE SULFATE 2 MG: 2 INJECTION, SOLUTION INTRAMUSCULAR; INTRAVENOUS at 05:22

## 2021-02-28 RX ADMIN — BISACODYL 10 MG: 5 TABLET, COATED ORAL at 15:12

## 2021-02-28 RX ADMIN — ACETAMINOPHEN 650 MG: 325 TABLET ORAL at 15:12

## 2021-02-28 RX ADMIN — MORPHINE SULFATE 4 MG: 4 INJECTION, SOLUTION INTRAMUSCULAR; INTRAVENOUS at 18:26

## 2021-02-28 RX ADMIN — SODIUM CHLORIDE: 9 INJECTION, SOLUTION INTRAVENOUS at 20:49

## 2021-02-28 RX ADMIN — POLYETHYLENE GLYCOL 3350 17 G: 17 POWDER, FOR SOLUTION ORAL at 20:45

## 2021-02-28 RX ADMIN — BUDESONIDE 3 MG: 3 CAPSULE ORAL at 09:35

## 2021-02-28 ASSESSMENT — PAIN SCALES - GENERAL
PAINLEVEL_OUTOF10: 5
PAINLEVEL_OUTOF10: 5

## 2021-02-28 NOTE — PROGRESS NOTES
Espinoza Guevara is a 76 y.o. male patient.     Current Facility-Administered Medications   Medication Dose Route Frequency Provider Last Rate Last Admin    mesalamine (DELZICOL) delayed release capsule 800 mg  800 mg Oral TID Africa Callahan MD   800 mg at 02/27/21 2035    melatonin disintegrating tablet 5 mg  5 mg Oral Nightly PRN Almeida Gey, APRN - CNP   5 mg at 02/27/21 2252    polyethylene glycol (GLYCOLAX) packet 17 g  17 g Oral Daily Africa Callahan MD   17 g at 02/27/21 0907    piperacillin-tazobactam (ZOSYN) 4,500 mg in dextrose 5 % 100 mL IVPB (mini-bag)  4,500 mg Intravenous Q8H Guilherme Leigh MD   Stopped at 02/28/21 0549    morphine (PF) injection 2 mg  2 mg Intravenous Q2H PRN Kaia Sexton MD   2 mg at 02/28/21 0522    Or    morphine (PF) injection 4 mg  4 mg Intravenous Q2H PRN Kaia Sexton MD   4 mg at 02/27/21 2252    0.9 % sodium chloride infusion  1,000 mL Intravenous Continuous Belén Obregon MD   Stopped at 02/24/21 2343    amLODIPine (NORVASC) tablet 5 mg  5 mg Oral BID Belén Obregon MD   5 mg at 02/27/21 2035    fenofibrate (TRICOR) tablet 54 mg  54 mg Oral Daily Belén Obregon MD   54 mg at 02/27/21 0907    HYDROcodone-acetaminophen (NORCO) 5-325 MG per tablet 1 tablet  1 tablet Oral Q6H PRN Belén Obregon MD   1 tablet at 02/28/21 0216    rivaroxaban (XARELTO) tablet 15 mg  15 mg Oral Daily with breakfast Belén Obregon MD   Stopped at 02/25/21 6140    atorvastatin (LIPITOR) tablet 20 mg  20 mg Oral Daily Belén Obregon MD   20 mg at 02/27/21 0907    budesonide (ENTOCORT EC) extended release capsule 3 mg  3 mg Oral Daily Belén Obregon MD   3 mg at 02/27/21 4021    sodium chloride flush 0.9 % injection 10 mL  10 mL Intravenous 2 times per day Belén Obregon MD   10 mL at 02/27/21 0912    sodium chloride flush 0.9 % injection 10 mL  10 mL Intravenous PRN Belén Obregon MD        promethazine (PHENERGAN) tablet 12.5 mg  12.5 mg Oral Q6H PRN Hodan Park MD        Or    ondansetron [facility]) injection 4 mg  4 mg Intravenous Q6H PRN Hodan Park MD   4 mg at 02/26/21 0243    acetaminophen (TYLENOL) tablet 650 mg  650 mg Oral Q6H PRN Hodan Park MD   650 mg at 02/25/21 1851    Or    acetaminophen (TYLENOL) suppository 650 mg  650 mg Rectal Q6H PRN Hodan Park MD        0.9 % sodium chloride infusion   Intravenous Continuous Hodan Park MD 75 mL/hr at 02/27/21 2254 New Bag at 02/27/21 2254    pantoprazole (PROTONIX) injection 40 mg  40 mg Intravenous Daily Hodan Park MD   40 mg at 02/27/21 0907     No Known Allergies  Active Problems:    Colitis  Resolved Problems:    * No resolved hospital problems. *    Blood pressure (!) 160/78, pulse 66, temperature 98.8 °F (37.1 °C), temperature source Oral, resp. rate 17, height 5' 11\" (1.803 m), weight 180 lb 9.6 oz (81.9 kg), SpO2 95 %. Subjective:  Symptoms:  Stable. Pain:  He complains of pain that is moderate. Objective:  General Appearance: In no acute distress and not in pain. Vital signs: (most recent): Blood pressure (!) 160/78, pulse 66, temperature 98.8 °F (37.1 °C), temperature source Oral, resp. rate 17, height 5' 11\" (1.803 m), weight 180 lb 9.6 oz (81.9 kg), SpO2 95 %. Heart: Normal rate. Regular rhythm. S1 normal and S2 normal.    Abdomen: Abdomen is soft. Bowel sounds are normal.   There is no abdominal tenderness. Assessment & Plan  75 yo w HTN, CKD and SMV and PV thrombosis since 2/2018, on Xarelto, admitted w h/o persistent abd pain and ischemic colitis in prox descending colon and splenic flexure confirmed by colonoscopy/Bx on 2/15/21, refractory to 2 courses of outpatient Abx's. Outpatient stool PCR was neg. Pain has slightly improved.     Plan:   1. Supportive care  2. Agree w IV Abx's  3. Will increase daily Miralax to bid  4. Continue PO Mesalamine (he is on Entocort 3 mg daily started by the primary team)  5.  Consider

## 2021-02-28 NOTE — PROGRESS NOTES
[facility]    PATIENT NAME: Belkis Taylor     TODAY'S DATE: 2/28/2021    CHIEF COMPLAINT: abd pain    INTERVAL HISTORY/HPI:    Pt with mild pain, no bms, having flatus, no fevers. REVIEW OF SYSTEMS:  Pertinent positives and negatives as per interval history section    OBJECTIVE:  VITALS:  BP (!) 157/79   Pulse 68   Temp 98.5 °F (36.9 °C) (Oral)   Resp 16   Ht 5' 11\" (1.803 m)   Wt 180 lb 9.6 oz (81.9 kg)   SpO2 96%   BMI 25.19 kg/m²     INTAKE/OUTPUT:    I/O last 3 completed shifts: In: 4187 [P.O.:600; I.V.:3513]  Out: -   No intake/output data recorded. CONSTITUTIONAL:  awake and alert  LUNGS:  Respirations easy and unlabored  CARD:  regular rate and rhythm  ABDOMEN:  normal bowel sounds, soft, non-distended, tender left side     Data:  CBC:   Recent Labs     02/26/21  0536   WBC 10.6   HGB 12.4*   HCT 37.2*        BMP:    Recent Labs     02/26/21  0536      K 4.0      CO2 28   BUN 18   CREATININE 1.1   GLUCOSE 123*     Hepatic:   Recent Labs     02/26/21  0536   AST 14*   ALT 14   BILITOT 0.6   ALKPHOS 61     Mag:    No results for input(s): MG in the last 72 hours. Phos:   No results for input(s): PHOS in the last 72 hours. INR: No results for input(s): INR in the last 72 hours. Radiology Review:  *Imaging personally reviewed by me. NA      ASSESSMENT AND PLAN:  75 yo with descending colitis  1.   On IV abx  2.  further management per GI     Electronically signed by Iris Turner MD     [id number]

## 2021-02-28 NOTE — PROGRESS NOTES
Pt is alert and oriented. VSS. RA. Pt c/o 7/10 abdominal pain. Pt given PRN pain medication per MAR. Shift assessment completed and documented. Call light within reach. Bed side table within reach. Wheels locked. Bed in lowest position. Pt instructed to call out for assistance. Pt expressesed understanding & calls out appropritately. All care per orders. Will continue to monitor.  Electronically signed by Gage Jackson RN on 2/27/2021 at 11:19 PM

## 2021-02-28 NOTE — PLAN OF CARE
Problem: Pain:  Goal: Pain level will decrease  Description: Pain level will decrease  Outcome: Ongoing  Note: Pt rates pain 6/10. Pt given PRN pain medication per MAR. Pt is aware of pain medication regimen. Pt instructed to call out when pain medication is needed. Pt given info related to non pharmacological pain management options. Pt verbalized understanding. Will continue to assess and reassess pain.

## 2021-03-01 ENCOUNTER — APPOINTMENT (OUTPATIENT)
Dept: GENERAL RADIOLOGY | Age: 66
DRG: 393 | End: 2021-03-01
Payer: MEDICARE

## 2021-03-01 ENCOUNTER — APPOINTMENT (OUTPATIENT)
Dept: INTERVENTIONAL RADIOLOGY/VASCULAR | Age: 66
DRG: 393 | End: 2021-03-01
Payer: MEDICARE

## 2021-03-01 LAB
ANION GAP SERPL CALCULATED.3IONS-SCNC: 10 MMOL/L (ref 3–16)
BASOPHILS ABSOLUTE: 0.1 K/UL (ref 0–0.2)
BASOPHILS RELATIVE PERCENT: 0.4 %
BUN BLDV-MCNC: 13 MG/DL (ref 7–20)
CALCIUM SERPL-MCNC: 8.2 MG/DL (ref 8.3–10.6)
CHLORIDE BLD-SCNC: 98 MMOL/L (ref 99–110)
CO2: 26 MMOL/L (ref 21–32)
CREAT SERPL-MCNC: 0.8 MG/DL (ref 0.8–1.3)
EOSINOPHILS ABSOLUTE: 0.1 K/UL (ref 0–0.6)
EOSINOPHILS RELATIVE PERCENT: 0.6 %
GFR AFRICAN AMERICAN: >60
GFR NON-AFRICAN AMERICAN: >60
GLUCOSE BLD-MCNC: 140 MG/DL (ref 70–99)
HCT VFR BLD CALC: 39.9 % (ref 40.5–52.5)
HEMOGLOBIN: 13 G/DL (ref 13.5–17.5)
LYMPHOCYTES ABSOLUTE: 0.4 K/UL (ref 1–5.1)
LYMPHOCYTES RELATIVE PERCENT: 3.5 %
MAGNESIUM: 1.9 MG/DL (ref 1.8–2.4)
MCH RBC QN AUTO: 26.5 PG (ref 26–34)
MCHC RBC AUTO-ENTMCNC: 32.6 G/DL (ref 31–36)
MCV RBC AUTO: 81.3 FL (ref 80–100)
MONOCYTES ABSOLUTE: 0.9 K/UL (ref 0–1.3)
MONOCYTES RELATIVE PERCENT: 6.8 %
NEUTROPHILS ABSOLUTE: 11.3 K/UL (ref 1.7–7.7)
NEUTROPHILS RELATIVE PERCENT: 88.7 %
PDW BLD-RTO: 13.9 % (ref 12.4–15.4)
PHOSPHORUS: 3.2 MG/DL (ref 2.5–4.9)
PLATELET # BLD: 242 K/UL (ref 135–450)
PMV BLD AUTO: 7.5 FL (ref 5–10.5)
POTASSIUM REFLEX MAGNESIUM: 3.8 MMOL/L (ref 3.5–5.1)
RBC # BLD: 4.91 M/UL (ref 4.2–5.9)
SODIUM BLD-SCNC: 134 MMOL/L (ref 136–145)
WBC # BLD: 12.8 K/UL (ref 4–11)

## 2021-03-01 PROCEDURE — C9113 INJ PANTOPRAZOLE SODIUM, VIA: HCPCS | Performed by: INTERNAL MEDICINE

## 2021-03-01 PROCEDURE — 6370000000 HC RX 637 (ALT 250 FOR IP): Performed by: INTERNAL MEDICINE

## 2021-03-01 PROCEDURE — 80048 BASIC METABOLIC PNL TOTAL CA: CPT

## 2021-03-01 PROCEDURE — 36573 INSJ PICC RS&I 5 YR+: CPT

## 2021-03-01 PROCEDURE — 02HV33Z INSERTION OF INFUSION DEVICE INTO SUPERIOR VENA CAVA, PERCUTANEOUS APPROACH: ICD-10-PCS | Performed by: INTERNAL MEDICINE

## 2021-03-01 PROCEDURE — 36415 COLL VENOUS BLD VENIPUNCTURE: CPT

## 2021-03-01 PROCEDURE — 6370000000 HC RX 637 (ALT 250 FOR IP): Performed by: HOSPITALIST

## 2021-03-01 PROCEDURE — 2580000003 HC RX 258: Performed by: INTERNAL MEDICINE

## 2021-03-01 PROCEDURE — 85025 COMPLETE CBC W/AUTO DIFF WBC: CPT

## 2021-03-01 PROCEDURE — 6360000002 HC RX W HCPCS: Performed by: INTERNAL MEDICINE

## 2021-03-01 PROCEDURE — 2500000003 HC RX 250 WO HCPCS: Performed by: INTERNAL MEDICINE

## 2021-03-01 PROCEDURE — 83735 ASSAY OF MAGNESIUM: CPT

## 2021-03-01 PROCEDURE — 6360000002 HC RX W HCPCS: Performed by: FAMILY MEDICINE

## 2021-03-01 PROCEDURE — 84100 ASSAY OF PHOSPHORUS: CPT

## 2021-03-01 PROCEDURE — C1769 GUIDE WIRE: HCPCS

## 2021-03-01 PROCEDURE — 1200000000 HC SEMI PRIVATE

## 2021-03-01 PROCEDURE — 84478 ASSAY OF TRIGLYCERIDES: CPT

## 2021-03-01 PROCEDURE — 74022 RADEX COMPL AQT ABD SERIES: CPT

## 2021-03-01 PROCEDURE — 99232 SBSQ HOSP IP/OBS MODERATE 35: CPT | Performed by: SURGERY

## 2021-03-01 PROCEDURE — 99232 SBSQ HOSP IP/OBS MODERATE 35: CPT | Performed by: INTERNAL MEDICINE

## 2021-03-01 RX ORDER — LIDOCAINE HYDROCHLORIDE 10 MG/ML
5 INJECTION, SOLUTION INFILTRATION; PERINEURAL ONCE
Status: COMPLETED | OUTPATIENT
Start: 2021-03-01 | End: 2021-03-01

## 2021-03-01 RX ORDER — SODIUM CHLORIDE 0.9 % (FLUSH) 0.9 %
10 SYRINGE (ML) INJECTION PRN
Status: DISCONTINUED | OUTPATIENT
Start: 2021-03-01 | End: 2021-03-01

## 2021-03-01 RX ORDER — SODIUM CHLORIDE 0.9 % (FLUSH) 0.9 %
10 SYRINGE (ML) INJECTION EVERY 12 HOURS SCHEDULED
Status: DISCONTINUED | OUTPATIENT
Start: 2021-03-01 | End: 2021-03-01

## 2021-03-01 RX ORDER — CLONIDINE HYDROCHLORIDE 0.1 MG/1
0.1 TABLET ORAL 2 TIMES DAILY
Status: DISCONTINUED | OUTPATIENT
Start: 2021-03-01 | End: 2021-03-04 | Stop reason: HOSPADM

## 2021-03-01 RX ORDER — HYDRALAZINE HYDROCHLORIDE 50 MG/1
50 TABLET, FILM COATED ORAL 3 TIMES DAILY PRN
Status: DISCONTINUED | OUTPATIENT
Start: 2021-03-01 | End: 2021-03-04 | Stop reason: HOSPADM

## 2021-03-01 RX ORDER — AMLODIPINE BESYLATE 5 MG/1
10 TABLET ORAL DAILY
Status: DISCONTINUED | OUTPATIENT
Start: 2021-03-02 | End: 2021-03-04 | Stop reason: HOSPADM

## 2021-03-01 RX ORDER — AMLODIPINE BESYLATE 5 MG/1
5 TABLET ORAL ONCE
Status: COMPLETED | OUTPATIENT
Start: 2021-03-01 | End: 2021-03-01

## 2021-03-01 RX ADMIN — SODIUM CHLORIDE: 9 INJECTION, SOLUTION INTRAVENOUS at 17:15

## 2021-03-01 RX ADMIN — ATORVASTATIN CALCIUM 20 MG: 10 TABLET, FILM COATED ORAL at 09:02

## 2021-03-01 RX ADMIN — SODIUM CHLORIDE, PRESERVATIVE FREE 10 ML: 5 INJECTION INTRAVENOUS at 09:03

## 2021-03-01 RX ADMIN — ONDANSETRON HYDROCHLORIDE 4 MG: 2 SOLUTION INTRAMUSCULAR; INTRAVENOUS at 17:11

## 2021-03-01 RX ADMIN — HYDROCODONE BITARTRATE AND ACETAMINOPHEN 1 TABLET: 5; 325 TABLET ORAL at 09:02

## 2021-03-01 RX ADMIN — MORPHINE SULFATE 4 MG: 4 INJECTION, SOLUTION INTRAMUSCULAR; INTRAVENOUS at 12:55

## 2021-03-01 RX ADMIN — PIPERACILLIN SODIUM AND TAZOBACTAM SODIUM 4500 MG: 4; .5 INJECTION, POWDER, LYOPHILIZED, FOR SOLUTION INTRAVENOUS at 05:01

## 2021-03-01 RX ADMIN — LIDOCAINE HYDROCHLORIDE 5 ML: 10 INJECTION, SOLUTION EPIDURAL; INFILTRATION; INTRACAUDAL; PERINEURAL at 15:32

## 2021-03-01 RX ADMIN — MORPHINE SULFATE 4 MG: 4 INJECTION, SOLUTION INTRAMUSCULAR; INTRAVENOUS at 17:15

## 2021-03-01 RX ADMIN — FENOFIBRATE 54 MG: 54 TABLET ORAL at 09:02

## 2021-03-01 RX ADMIN — AMLODIPINE BESYLATE 5 MG: 5 TABLET ORAL at 21:41

## 2021-03-01 RX ADMIN — PIPERACILLIN SODIUM AND TAZOBACTAM SODIUM 4500 MG: 4; .5 INJECTION, POWDER, LYOPHILIZED, FOR SOLUTION INTRAVENOUS at 12:52

## 2021-03-01 RX ADMIN — SODIUM CHLORIDE: 9 INJECTION, SOLUTION INTRAVENOUS at 21:52

## 2021-03-01 RX ADMIN — RIVAROXABAN 15 MG: 15 TABLET, FILM COATED ORAL at 21:41

## 2021-03-01 RX ADMIN — MESALAMINE 800 MG: 400 CAPSULE, DELAYED RELEASE ORAL at 09:02

## 2021-03-01 RX ADMIN — PIPERACILLIN SODIUM AND TAZOBACTAM SODIUM 4500 MG: 4; .5 INJECTION, POWDER, LYOPHILIZED, FOR SOLUTION INTRAVENOUS at 21:48

## 2021-03-01 RX ADMIN — MORPHINE SULFATE 4 MG: 4 INJECTION, SOLUTION INTRAMUSCULAR; INTRAVENOUS at 00:15

## 2021-03-01 RX ADMIN — MORPHINE SULFATE 4 MG: 4 INJECTION, SOLUTION INTRAMUSCULAR; INTRAVENOUS at 21:56

## 2021-03-01 RX ADMIN — MORPHINE SULFATE 4 MG: 4 INJECTION, SOLUTION INTRAMUSCULAR; INTRAVENOUS at 04:04

## 2021-03-01 RX ADMIN — CLONIDINE HYDROCHLORIDE 0.1 MG: 0.1 TABLET ORAL at 21:41

## 2021-03-01 RX ADMIN — AMLODIPINE BESYLATE 5 MG: 5 TABLET ORAL at 09:02

## 2021-03-01 RX ADMIN — PROMETHAZINE HYDROCHLORIDE 12.5 MG: 25 TABLET ORAL at 09:19

## 2021-03-01 RX ADMIN — PANTOPRAZOLE SODIUM 40 MG: 40 INJECTION, POWDER, FOR SOLUTION INTRAVENOUS at 09:02

## 2021-03-01 RX ADMIN — BISACODYL 10 MG: 5 TABLET, COATED ORAL at 09:02

## 2021-03-01 RX ADMIN — MORPHINE SULFATE 4 MG: 4 INJECTION, SOLUTION INTRAMUSCULAR; INTRAVENOUS at 10:40

## 2021-03-01 RX ADMIN — CLONIDINE HYDROCHLORIDE 0.1 MG: 0.1 TABLET ORAL at 12:52

## 2021-03-01 RX ADMIN — BUDESONIDE 3 MG: 3 CAPSULE ORAL at 09:02

## 2021-03-01 RX ADMIN — MESALAMINE 800 MG: 400 CAPSULE, DELAYED RELEASE ORAL at 21:41

## 2021-03-01 ASSESSMENT — PAIN SCALES - GENERAL
PAINLEVEL_OUTOF10: 7
PAINLEVEL_OUTOF10: 7
PAINLEVEL_OUTOF10: 8

## 2021-03-01 ASSESSMENT — PAIN DESCRIPTION - DESCRIPTORS: DESCRIPTORS: ACHING;CONSTANT;CRAMPING;DISCOMFORT

## 2021-03-01 ASSESSMENT — PAIN DESCRIPTION - FREQUENCY: FREQUENCY: CONTINUOUS

## 2021-03-01 ASSESSMENT — PAIN DESCRIPTION - LOCATION: LOCATION: ABDOMEN

## 2021-03-01 ASSESSMENT — PAIN DESCRIPTION - ORIENTATION: ORIENTATION: LEFT

## 2021-03-01 NOTE — CARE COORDINATION
Chart reviewed day 5. Care managed per ID, GI, surgery and IM. Per surgery note patient has continued pain. Has had BM. Will continue IVATBX no imminent surgical plan. Patient reported [facility] from home with wife.  Jennifer Colon RN

## 2021-03-01 NOTE — PROGRESS NOTES
Pt alert and oriented. Assessment completed and charted earlier in shift. Had small BM this morning, but still with c/o pain, see eMAR for PRNs given throughout shift. Still not taking in much PO. Had fever, medicated with tylenol, will reassess later.

## 2021-03-01 NOTE — PROGRESS NOTES
Comprehensive Nutrition Assessment    Type and Reason for Visit:  Initial, Consult(TPN recommendations only)    Nutrition Recommendations/Plan:   1. Full liquids advance as tolerated per MD  2. Patient agreed to try Ensure, if not tolerated will offer Ensure clear  3. Clinimix 5/20 E start at 35 ml/hr first bag, increase second bag to 65 ml/hr, increase third bag to 83 ml/hr. Recommend not advancing rate if electrolytes are requiring significant repletion. At goal rate TPN provides 100 grams of protein, 1705 calories, GIR 3.4 mg/kg/min  4. Lipids bi-weekly  5. Ordered Mg, Phos and TG be drawn today since starting TPN likely in the next 24 hours for baseline level    Nutrition Assessment:  Patient admitted with colitis. Currently on a full liquid diet. Poor po intake at this time. Patient with some abd pain. Normal saline at 75 ml/hr. Picc line to be placed for TPN. Patient agreeable to Ensure with meals to enhance protein, did not eat much of lunch. Malnutrition Assessment:  Malnutrition Status: At risk for malnutrition (Comment)      Estimated Daily Nutrient Needs:  Energy (kcal):  0661-4047; Weight Used for Energy Requirements:  Ideal     Protein (g):  102-117; Weight Used for Protein Requirements:  Ideal(1.3-1.5)        Fluid (ml/day):   ; Method Used for Fluid Requirements:  1 ml/kcal      Nutrition Related Findings:  BM x 1 on 2/28      Wounds:  None       Current Nutrition Therapies:    DIET FULL LIQUID;  Dietary Nutrition Supplements: Standard High Calorie Oral Supplement  Current Parenteral Nutrition Orders:  · Goal PN Orders Provides: Clinimix 5/20 E start at 35 ml/hr first bag, increase second bag to 65 ml/hr, increase third bag to 83 ml/hr. Recommend not advancing rate if electrolytes are requiring significant repletion.   At goal rate TPN provides 100 grams of protein, 1705 calories, GIR 3.4 mg/kg/min    Anthropometric Measures:  · Height: 5' 11\" (180.3 cm)  · Current Body Weight: 180 lb 9 oz (81.9 kg)   · Admission Body Weight: 180 lb 9 oz (81.9 kg)    · Ideal Body Weight: 172 lbs; % Ideal Body Weight     · BMI: 25.2  · BMI Categories: Overweight (BMI 25.0-29. 9)       Nutrition Diagnosis:   · Inadequate oral intake related to altered GI function as evidenced by intake 0-25%, intake 26-50%   ·   Nutrition Interventions:   Food and/or Nutrient Delivery:  Continue Current Diet, Start Oral Nutrition Supplement, Start Parenteral Nutrititon(possibly starting TPN in the next 24 hours)  Nutrition Education/Counseling:  No recommendation at this time   Coordination of Nutrition Care:  Continue to monitor while inpatient    Goals:  Patient will eat 50% or greater of meals and supplements without GI distress. Nutrition Monitoring and Evaluation:   Behavioral-Environmental Outcomes:      Food/Nutrient Intake Outcomes:  Supplement Intake, Food and Nutrient Intake, Parenteral Nutrition Intake/Tolerance, IVF Intake  Physical Signs/Symptoms Outcomes:  Biochemical Data     Discharge Planning:     Too soon to determine     Electronically signed by Mick Tate RD, LD on 3/1/21 at 4:53 PM EST    Contact: Office: [phone]; [address]: [phone]

## 2021-03-01 NOTE — PROGRESS NOTES
[facility]    PATIENT NAME: Matthew Lacey     TODAY'S DATE: 3/1/2021    CHIEF COMPLAINT: Abdominal pain    INTERVAL HISTORY/HPI:    Pt states his pain is about the same in the left side. He is tolerating a diet. He had a bowel movement. REVIEW OF SYSTEMS:  Pertinent positives and negatives as per interval history section    OBJECTIVE:  VITALS:  BP (!) 181/95   Pulse 86   Temp 98.6 °F (37 °C) (Oral)   Resp 16   Ht 5' 11\" (1.803 m)   Wt 180 lb 9.6 oz (81.9 kg)   SpO2 94%   BMI 25.19 kg/m²     INTAKE/OUTPUT:    I/O last 3 completed shifts: In: 1830 [P.O.:600; I.V.:1150]  Out: -   No intake/output data recorded. CONSTITUTIONAL:  awake and alert  LUNGS:  Respirations easy and unlabored, clear to auscultation  CARD:  regular rate and rhythm  ABDOMEN:  normal bowel sounds, soft, non-distended, tenderness noted left mid abdomen    Data:  CBC:   Recent Labs     03/01/21  0956   WBC 12.8*   HGB 13.0*   HCT 39.9*        BMP:  No results for input(s): NA, K, CL, CO2, BUN, CREATININE, GLUCOSE in the last 72 hours. Hepatic: No results for input(s): AST, ALT, ALB, BILITOT, ALKPHOS in the last 72 hours. Mag:    No results for input(s): MG in the last 72 hours. Phos:   No results for input(s): PHOS in the last 72 hours. INR: No results for input(s): INR in the last 72 hours. Radiology Review:  *Imaging personally reviewed by me. N/A      ASSESSMENT AND PLAN:  65-year-old with descending colitis. Continue IV antibiotics. No imminent surgical plans.   We will follow     Electronically signed by Farhad Miramontes MD     [id number]

## 2021-03-01 NOTE — PROGRESS NOTES
Maxim Hair is a 76 y.o. male patient.     Current Facility-Administered Medications   Medication Dose Route Frequency Provider Last Rate Last Admin    polyethylene glycol (GLYCOLAX) packet 17 g  17 g Oral BID Jessica Benoit MD   17 g at 02/28/21 2045    bisacodyl (DULCOLAX) EC tablet 10 mg  10 mg Oral Daily Keon Lee MD   10 mg at 02/28/21 1512    mesalamine (DELZICOL) delayed release capsule 800 mg  800 mg Oral TID Jessica Benoit MD   800 mg at 02/28/21 2044    melatonin disintegrating tablet 5 mg  5 mg Oral Nightly PRN Alexey Males, APRN - CNP   5 mg at 02/27/21 2252    piperacillin-tazobactam (ZOSYN) 4,500 mg in dextrose 5 % 100 mL IVPB (mini-bag)  4,500 mg Intravenous Q8H Neema Tidwell MD   Stopped at 03/01/21 0531    morphine (PF) injection 2 mg  2 mg Intravenous Q2H PRN Sean Pedro MD   2 mg at 02/28/21 0522    Or    morphine (PF) injection 4 mg  4 mg Intravenous Q2H PRN Sean Pedro MD   4 mg at 03/01/21 0404    amLODIPine (NORVASC) tablet 5 mg  5 mg Oral BID Emigdio Thomas MD   5 mg at 02/28/21 2048    fenofibrate (TRICOR) tablet 54 mg  54 mg Oral Daily Emigdio Thomas MD   54 mg at 02/28/21 0935    HYDROcodone-acetaminophen ([address]) 5-325 MG per tablet 1 tablet  1 tablet Oral Q6H PRN Emigdio Thomas MD   1 tablet at 02/28/21 0216    rivaroxaban (XARELTO) tablet 15 mg  15 mg Oral Daily with breakfast Emigdio Thomas MD   Stopped at 02/25/21 7496    atorvastatin (LIPITOR) tablet 20 mg  20 mg Oral Daily Emigdio Thomas MD   20 mg at 02/28/21 0935    budesonide (ENTOCORT EC) extended release capsule 3 mg  3 mg Oral Daily Emigdio Thomas MD   3 mg at 02/28/21 0935    sodium chloride flush 0.9 % injection 10 mL  10 mL Intravenous 2 times per day Emigdio Thomas MD   10 mL at 02/28/21 0939    sodium chloride flush 0.9 % injection 10 mL  10 mL Intravenous PRN Emigdio Thomas MD        promethazine (PHENERGAN) tablet 12.5 mg  12.5 mg Oral Q6H PRN Emigdio Thomas

## 2021-03-01 NOTE — CONSULTS
Nephrology Consult Note                                                                                                                                                                                                                                                                                                                                                               Office : [phone]     Fax :[phone]              Patient's Name: Zamzam Uriostegui  11:15 AM  3/1/2021    Reason for Consult:   CKD , need for IV access       Requesting Physician:  Heidi Flores DO      Chief Complaint:  Abdominal pain     History of Present Ilness:    Zamzam Uriostegui is a 76 y.o. male  With PMH of HTN , CKD , SMV and PV thrombosis since 2/18 , on xarelto c/w c/o abdominal pain    2/15 colonoscopy : ischemic colitis     C/o nausea    Past Medical History:   Diagnosis Date    Hx of blood clots     Hyperlipidemia     Hypertension     PONV (postoperative nausea and vomiting)     Symptomatic cholelithiasis     Thrombosis        Past Surgical History:   Procedure Laterality Date    ANKLE SURGERY      left    CHOLECYSTECTOMY, LAPAROSCOPIC N/A 8/13/2020    LAPAROSCOPIC CHOLECYSTECTOMY WITH INTRAOPERATIVE CHOLANGIOGRAM performed by Rafi Albarado MD at [place] [address]  03/08/2017    SHOULDER SURGERY Right 2012    TONSILLECTOMY      WRIST SURGERY      right       Family History   Problem Relation Age of Onset    Breast Cancer Mother     Coronary Art Dis Father     Diabetes Sister         reports that he has never smoked. He has never used smokeless tobacco. He reports current alcohol use. He reports that he does not use drugs. Allergies:  Patient has no known allergies.     Current Medications:        cloNIDine (CATAPRES) tablet 0.1 mg, BID      lidocaine 1 % injection 5 mL, Once      bisacodyl (DULCOLAX) EC tablet 10 mg, Daily      mesalamine (DELZICOL) delayed release capsule 800 mg, TID     melatonin disintegrating tablet 5 mg, Nightly PRN      piperacillin-tazobactam (ZOSYN) 4,500 mg in dextrose 5 % 100 mL IVPB (mini-bag), Q8H      morphine (PF) injection 2 mg, Q2H PRN    Or      morphine (PF) injection 4 mg, Q2H PRN      amLODIPine (NORVASC) tablet 5 mg, BID      fenofibrate (TRICOR) tablet 54 mg, Daily      HYDROcodone-acetaminophen (NORCO) 5-325 MG per tablet 1 tablet, Q6H PRN      rivaroxaban (XARELTO) tablet 15 mg, Daily with breakfast      atorvastatin (LIPITOR) tablet 20 mg, Daily      budesonide (ENTOCORT EC) extended release capsule 3 mg, Daily      sodium chloride flush 0.9 % injection 10 mL, 2 times per day      sodium chloride flush 0.9 % injection 10 mL, PRN      promethazine (PHENERGAN) tablet 12.5 mg, Q6H PRN    Or      ondansetron (ZOFRAN) injection 4 mg, Q6H PRN      acetaminophen (TYLENOL) tablet 650 mg, Q6H PRN    Or      acetaminophen (TYLENOL) suppository 650 mg, Q6H PRN      0.9 % sodium chloride infusion, Continuous      pantoprazole (PROTONIX) injection 40 mg, Daily        Review of Systems:   14 point ROS obtained but were negative except mentioned in HPI      Physical exam:     Vitals:  BP (!) 170/89   Pulse 77   Temp 98.6 °F (37 °C) (Oral)   Resp 15   Ht 5' 11\" (1.803 m)   Wt 180 lb 9.6 oz (81.9 kg)   SpO2 94%   BMI 25.19 kg/m²   Constitutional:  OAA X3 NAD  Skin: no rash, turgor wnl  Heent:  eomi, mmm  Neck: no bruits or jvd noted  Cardiovascular:  S1, S2 without m/r/g  Respiratory: CTA B without w/r/r  Abdomen:  +bs, soft, nt, nd  Ext: no  lower extremity edema  Psychiatric: mood and affect appropriate  Musculoskeletal:  Rom, muscular strength intact    Data:   Labs:  CBC:   Recent Labs     03/01/21  0956   WBC 12.8*   HGB 13.0*        BMP:  No results for input(s): NA, K, CL, CO2, BUN, CREATININE, GLUCOSE in the last 72 hours. Ca/Mg/Phos: No results for input(s): CALCIUM, MG, PHOS in the last 72 hours.   Hepatic: No results for input(s): AST, ALT, ALB, BILITOT, ALKPHOS in the last 72 hours. Troponin: No results for input(s): TROPONINI in the last 72 hours. BNP: No results for input(s): BNP in the last 72 hours. Lipids: No results for input(s): CHOL, TRIG, HDL, LDLCALC, LABVLDL in the last 72 hours. ABGs: No results for input(s): PHART, PO2ART, GHY1PZH in the last 72 hours. INR: No results for input(s): INR in the last 72 hours. UA:No results for input(s): Spencer Villareal, GLUCOSEU, BILIRUBINUR, Mika Dorsey, BLOODU, PHUR, PROTEINU, UROBILINOGEN, NITRU, LEUKOCYTESUR, LABMICR, URINETYPE in the last 72 hours. Urine Microscopic: No results for input(s): LABCAST, BACTERIA, COMU, HYALCAST, WBCUA, RBCUA, EPIU in the last 72 hours. Urine Culture: No results for input(s): LABURIN in the last 72 hours. Urine Chemistry: No results for input(s): Dayna Yon, PROTEINUR, NAUR in the last 72 hours. IMAGING:  CTA ABDOMEN PELVIS W CONTRAST   Final Result   Colitis of the descending colon. The mesenteric and colic arteries are   patent, and there are no secondary findings of ischemia such as pneumatosis,   mesenteric/portal venous gas, or pneumoperitoneum             Assessment/Plan       1.   CKD stage 2/3        Serum cr at baseline         GFR > 60           Ok to get PICC line on dominant arm if absolute requirement for IV access                   2. HTN    Change to Amlodipine 10 mg daily    Cont clonidine    Add hydralazine prn                   Thank you for allowing us to participate in care of Yariel Miles free to contact me   [facility] [address]  Office : [phone]  Fax :[phone]

## 2021-03-01 NOTE — PROGRESS NOTES
Pt is alert and oriented. VSS. RA. Pt c/o 7/10 pain. Pt given PRN pain medication per MAR. Shift assessment completed and documented. Call light within reach. Bed side table within reach. Wheels locked. Bed in lowest position. Pt instructed to call out for assistance. Pt expressesed understanding & calls out appropritately. All care per orders. Will continue to monitor.  Electronically signed by Britton Rodgers RN on 3/1/2021 at 1:18 AM

## 2021-03-01 NOTE — PROGRESS NOTES
Neph clearance obtained. PICC consent complete, placed into pts chart. PICC team made aware at this time.

## 2021-03-01 NOTE — PLAN OF CARE
Problem: Pain:  Goal: Pain level will decrease  Description: Pain level will decrease  Outcome: Ongoing  Note: Pt rates pain 7/10. Pt given PRN pain medication. Pt is aware of pain medication regimen. Pt instructed to call out when pain medication is needed. Pt given info related to non pharmacological pain management options. Pt verbalized understanding. Will continue to assess and reassess pain.

## 2021-03-01 NOTE — PROGRESS NOTES
Infectious Disease Follow up Notes    CC :  Descending colitis     Antibiotics:   Zosyn 4.5 q8     Admit Date:   2/24/2021  Hospital Day: 6    Subjective:   He doesn't feel good. Still having LLQ pain but overall thinks he may be doing somewhat better. Had BM, no tenesmus. Voiding without difficulty. Poor po intake. Some nausea, hiccoughs. Still req frequent doses of morphine for pain. Objective:     Patient Vitals for the past 8 hrs:   BP Temp Temp src Pulse Resp SpO2   03/01/21 1100 -- -- -- (P) 72 -- --   03/01/21 1042 (!) 170/89 -- -- 77 15 --   03/01/21 0745 (!) 181/95 98.6 °F (37 °C) Oral 86 16 94 %       EXAM:  General:  Alert, oriented, NAD  Ambulatory in the room    HEENT:  NCAT, PERRL, sclera anicteric  NECK:   supple  LUNGS:   CTA upper lobes   ABD:  Distended. Tender lower quadrants, no guarding.   Bowel sounds present  EXT:  No focal rash       LINE: IV site ok       Scheduled Meds:   cloNIDine  0.1 mg Oral BID    lidocaine 1 % injection  5 mL Intradermal Once    bisacodyl  10 mg Oral Daily    mesalamine  800 mg Oral TID    piperacillin-tazobactam  4,500 mg Intravenous Q8H    amLODIPine  5 mg Oral BID    fenofibrate  54 mg Oral Daily    rivaroxaban  15 mg Oral Daily with breakfast    atorvastatin  20 mg Oral Daily    sodium chloride flush  10 mL Intravenous 2 times per day    pantoprazole  40 mg Intravenous Daily       Continuous Infusions:   sodium chloride 75 mL/hr at 02/28/21 2049          Data Review:    Lab Results   Component Value Date    WBC 12.8 (H) 03/01/2021    HGB 13.0 (L) 03/01/2021    HCT 39.9 (L) 03/01/2021    MCV 81.3 03/01/2021     03/01/2021     Lab Results   Component Value Date    CREATININE 0.8 03/01/2021    BUN 13 03/01/2021     (L) 03/01/2021    K 3.8 03/01/2021    CL 98 (L) 03/01/2021    CO2 26 03/01/2021       Hepatic Function Panel:   Lab Results   Component Value Date    ALKPHOS 61 02/26/2021    ALT 14 02/26/2021    AST 14 02/26/2021    PROT 6.2 02/26/2021    BILITOT 0.6 02/26/2021    BILIDIR <0.2 02/20/2020    IBILI see below 02/20/2020    LABALBU 3.3 02/26/2021       Radiology Review:  All pertinent images / reports were reviewed as a part of this visit. CTA a/p 2/24/21   Impression   Colitis of the descending colon.  The mesenteric and colic arteries are   patent, and there are no secondary findings of ischemia such as pneumatosis,   mesenteric/portal venous gas, or pneumoperitoneum      CT a/p 2/21/21  Impression   Slightly increased severity of colitis involving the descending colon which   is most likely infectious or inflammatory, less likely ischemic.  No evidence   of complication of the colitis.      CT a/p 2/6/21  Impression   Findings compatible with nonspecific infectious/inflammatory colitis   involving distal splenic flexure through distal descending colon.  No   evidence for bowel obstruction, perforation or abscess formation.        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 Erum Bardales. CTA this admission with patent mesenteric and colic arteries.     Did not seem to improve with cipro/flagyl/budesonide  Serum CMV PCR is pending     Perhaps somewhat better since changing to Zosyn though poor po intake and still needing frequent morphine   He is open to the idea of continued IV abx after DC though still not really clear that colon resection won't be necessary    PICC has been ordered for possible TPN, possible outpt IV abx -continue Zosyn for now  -if he shows improvement, could consider change to ertapenem at DC   -AXR ordered     Discussed with patient/family, all questions answered  D/w Dr. Isabelle Chauhan MD  Phone: [phone]   Fax : [phone]

## 2021-03-01 NOTE — CONSULTS
Nephrology consult received    Ms Moy Middleton [facility]     Her serum Cr 1.1 and GFR stable    Its ok to get PICC line if absolute requirement for medical therapy

## 2021-03-02 LAB
ANION GAP SERPL CALCULATED.3IONS-SCNC: 7 MMOL/L (ref 3–16)
BUN BLDV-MCNC: 16 MG/DL (ref 7–20)
CALCIUM SERPL-MCNC: 8.4 MG/DL (ref 8.3–10.6)
CHLORIDE BLD-SCNC: 98 MMOL/L (ref 99–110)
CMV DNA QNT PCR: <2.6 LOG CPY/ML
CMV DNA QUANTATATIVE INTERPRETATION: <2.4 LOG IU/ML
CMV DNA QUANTATATIVE INTERPRETATION: NOT DETECTED
CMV DNA QUANTITATIVE: <227 IU/ML
CMV SOURCE: NORMAL
CMVQ COPY/ML: <390 CPY/ML
CO2: 27 MMOL/L (ref 21–32)
CREAT SERPL-MCNC: 1 MG/DL (ref 0.8–1.3)
GFR AFRICAN AMERICAN: >60
GFR NON-AFRICAN AMERICAN: >60
GLUCOSE BLD-MCNC: 131 MG/DL (ref 70–99)
GLUCOSE BLD-MCNC: 142 MG/DL (ref 70–99)
MAGNESIUM: 1.8 MG/DL (ref 1.8–2.4)
PERFORMED ON: ABNORMAL
PHOSPHORUS: 2.1 MG/DL (ref 2.5–4.9)
POTASSIUM SERPL-SCNC: 3.7 MMOL/L (ref 3.5–5.1)
SODIUM BLD-SCNC: 132 MMOL/L (ref 136–145)
TRIGL SERPL-MCNC: 74 MG/DL (ref 0–150)

## 2021-03-02 PROCEDURE — 2580000003 HC RX 258: Performed by: INTERNAL MEDICINE

## 2021-03-02 PROCEDURE — 6370000000 HC RX 637 (ALT 250 FOR IP): Performed by: NURSE PRACTITIONER

## 2021-03-02 PROCEDURE — 2500000003 HC RX 250 WO HCPCS: Performed by: INTERNAL MEDICINE

## 2021-03-02 PROCEDURE — 80048 BASIC METABOLIC PNL TOTAL CA: CPT

## 2021-03-02 PROCEDURE — 6370000000 HC RX 637 (ALT 250 FOR IP): Performed by: HOSPITALIST

## 2021-03-02 PROCEDURE — 6370000000 HC RX 637 (ALT 250 FOR IP): Performed by: INTERNAL MEDICINE

## 2021-03-02 PROCEDURE — 99231 SBSQ HOSP IP/OBS SF/LOW 25: CPT | Performed by: SURGERY

## 2021-03-02 PROCEDURE — 83735 ASSAY OF MAGNESIUM: CPT

## 2021-03-02 PROCEDURE — 6360000002 HC RX W HCPCS: Performed by: FAMILY MEDICINE

## 2021-03-02 PROCEDURE — C9113 INJ PANTOPRAZOLE SODIUM, VIA: HCPCS | Performed by: INTERNAL MEDICINE

## 2021-03-02 PROCEDURE — 84100 ASSAY OF PHOSPHORUS: CPT

## 2021-03-02 PROCEDURE — APPSS45 APP SPLIT SHARED TIME 31-45 MINUTES: Performed by: CLINICAL NURSE SPECIALIST

## 2021-03-02 PROCEDURE — 6360000002 HC RX W HCPCS: Performed by: INTERNAL MEDICINE

## 2021-03-02 PROCEDURE — 99232 SBSQ HOSP IP/OBS MODERATE 35: CPT | Performed by: INTERNAL MEDICINE

## 2021-03-02 PROCEDURE — 1200000000 HC SEMI PRIVATE

## 2021-03-02 RX ORDER — DEXTROSE MONOHYDRATE 25 G/50ML
12.5 INJECTION, SOLUTION INTRAVENOUS PRN
Status: DISCONTINUED | OUTPATIENT
Start: 2021-03-02 | End: 2021-03-04 | Stop reason: HOSPADM

## 2021-03-02 RX ORDER — DEXTROSE MONOHYDRATE 50 MG/ML
100 INJECTION, SOLUTION INTRAVENOUS PRN
Status: DISCONTINUED | OUTPATIENT
Start: 2021-03-02 | End: 2021-03-04 | Stop reason: HOSPADM

## 2021-03-02 RX ORDER — LISINOPRIL 10 MG/1
10 TABLET ORAL DAILY
Status: DISCONTINUED | OUTPATIENT
Start: 2021-03-02 | End: 2021-03-02

## 2021-03-02 RX ORDER — NICOTINE POLACRILEX 4 MG
15 LOZENGE BUCCAL PRN
Status: DISCONTINUED | OUTPATIENT
Start: 2021-03-02 | End: 2021-03-04 | Stop reason: HOSPADM

## 2021-03-02 RX ADMIN — MORPHINE SULFATE 2 MG: 2 INJECTION, SOLUTION INTRAMUSCULAR; INTRAVENOUS at 14:38

## 2021-03-02 RX ADMIN — METOPROLOL TARTRATE 25 MG: 25 TABLET, FILM COATED ORAL at 20:31

## 2021-03-02 RX ADMIN — METOPROLOL TARTRATE 25 MG: 25 TABLET, FILM COATED ORAL at 12:56

## 2021-03-02 RX ADMIN — I.V. FAT EMULSION 250 ML: 20 EMULSION INTRAVENOUS at 18:51

## 2021-03-02 RX ADMIN — MORPHINE SULFATE 4 MG: 4 INJECTION, SOLUTION INTRAMUSCULAR; INTRAVENOUS at 22:18

## 2021-03-02 RX ADMIN — CLONIDINE HYDROCHLORIDE 0.1 MG: 0.1 TABLET ORAL at 20:31

## 2021-03-02 RX ADMIN — BISACODYL 10 MG: 5 TABLET, COATED ORAL at 10:37

## 2021-03-02 RX ADMIN — RIVAROXABAN 15 MG: 15 TABLET, FILM COATED ORAL at 10:37

## 2021-03-02 RX ADMIN — ASCORBIC ACID, VITAMIN A PALMITATE, CHOLECALCIFEROL, THIAMINE HYDROCHLORIDE, RIBOFLAVIN-5 PHOSPHATE SODIUM, PYRIDOXINE HYDROCHLORIDE, NIACINAMIDE, DEXPANTHENOL, ALPHA-TOCOPHEROL ACETATE, VITAMIN K1, FOLIC ACID, BIOTIN, CYANOCOBALAMIN: 200; 3300; 200; 6; 3.6; 6; 40; 15; 10; 150; 600; 60; 5 INJECTION, SOLUTION INTRAVENOUS at 18:51

## 2021-03-02 RX ADMIN — PANTOPRAZOLE SODIUM 40 MG: 40 INJECTION, POWDER, FOR SOLUTION INTRAVENOUS at 10:36

## 2021-03-02 RX ADMIN — SODIUM CHLORIDE: 9 INJECTION, SOLUTION INTRAVENOUS at 18:50

## 2021-03-02 RX ADMIN — MESALAMINE 800 MG: 400 CAPSULE, DELAYED RELEASE ORAL at 10:37

## 2021-03-02 RX ADMIN — PIPERACILLIN SODIUM AND TAZOBACTAM SODIUM 4500 MG: 4; .5 INJECTION, POWDER, LYOPHILIZED, FOR SOLUTION INTRAVENOUS at 20:30

## 2021-03-02 RX ADMIN — MORPHINE SULFATE 2 MG: 2 INJECTION, SOLUTION INTRAMUSCULAR; INTRAVENOUS at 04:20

## 2021-03-02 RX ADMIN — SODIUM CHLORIDE, PRESERVATIVE FREE 10 ML: 5 INJECTION INTRAVENOUS at 10:38

## 2021-03-02 RX ADMIN — PIPERACILLIN SODIUM AND TAZOBACTAM SODIUM 4500 MG: 4; .5 INJECTION, POWDER, LYOPHILIZED, FOR SOLUTION INTRAVENOUS at 14:37

## 2021-03-02 RX ADMIN — INSULIN LISPRO 1 UNITS: 100 INJECTION, SOLUTION INTRAVENOUS; SUBCUTANEOUS at 20:36

## 2021-03-02 RX ADMIN — ATORVASTATIN CALCIUM 20 MG: 10 TABLET, FILM COATED ORAL at 10:37

## 2021-03-02 RX ADMIN — MESALAMINE 800 MG: 400 CAPSULE, DELAYED RELEASE ORAL at 14:38

## 2021-03-02 RX ADMIN — FENOFIBRATE 54 MG: 54 TABLET ORAL at 10:37

## 2021-03-02 RX ADMIN — MORPHINE SULFATE 2 MG: 2 INJECTION, SOLUTION INTRAMUSCULAR; INTRAVENOUS at 10:35

## 2021-03-02 RX ADMIN — AMLODIPINE BESYLATE 10 MG: 5 TABLET ORAL at 10:37

## 2021-03-02 RX ADMIN — CLONIDINE HYDROCHLORIDE 0.1 MG: 0.1 TABLET ORAL at 10:37

## 2021-03-02 RX ADMIN — PIPERACILLIN SODIUM AND TAZOBACTAM SODIUM 4500 MG: 4; .5 INJECTION, POWDER, LYOPHILIZED, FOR SOLUTION INTRAVENOUS at 04:18

## 2021-03-02 RX ADMIN — HYDROCODONE BITARTRATE AND ACETAMINOPHEN 1 TABLET: 5; 325 TABLET ORAL at 20:35

## 2021-03-02 ASSESSMENT — PAIN SCALES - GENERAL
PAINLEVEL_OUTOF10: 4
PAINLEVEL_OUTOF10: 5
PAINLEVEL_OUTOF10: 4

## 2021-03-02 NOTE — PROGRESS NOTES
Regina Deleon is a 76 y.o. male patient.     Current Facility-Administered Medications   Medication Dose Route Frequency Provider Last Rate Last Admin    PN-Adult Premix 5/20 - Standard Electrolytes - Central Line   Intravenous Continuous TPN Avi Mancia MD        fat emulsion 20 % infusion 250 mL  250 mL Intravenous Once per day on Mon Thu Avi Mancia MD        lisinopril (PRINIVIL;ZESTRIL) tablet 10 mg  10 mg Oral Daily Brian Xavier MD        cloNIDine (CATAPRES) tablet 0.1 mg  0.1 mg Oral BID Avi Mancia MD   0.1 mg at 03/02/21 1037    amLODIPine (NORVASC) tablet 10 mg  10 mg Oral Daily Brian Xavier MD   10 mg at 03/02/21 1037    hydrALAZINE (APRESOLINE) tablet 50 mg  50 mg Oral TID PRN Brian Xavier MD        bisacodyl (DULCOLAX) EC tablet 10 mg  10 mg Oral Daily Kendrick Broussard MD   10 mg at 03/02/21 1037    mesalamine (DELZICOL) delayed release capsule 800 mg  800 mg Oral TID Nelia Gimenez MD   800 mg at 03/02/21 1037    melatonin disintegrating tablet 5 mg  5 mg Oral Nightly PRN JOLANTA Farmer - CNP   5 mg at 02/27/21 2252    piperacillin-tazobactam (ZOSYN) 4,500 mg in dextrose 5 % 100 mL IVPB (mini-bag)  4,500 mg Intravenous Q8H Cesar Perea MD   Stopped at 03/02/21 0448    morphine (PF) injection 2 mg  2 mg Intravenous Q2H PRN Marco A Ibrahim MD   2 mg at 03/02/21 1035    Or    morphine (PF) injection 4 mg  4 mg Intravenous Q2H PRN Marco A Ibrahim MD   4 mg at 03/01/21 2156    fenofibrate (TRICOR) tablet 54 mg  54 mg Oral Daily Bernadine Perez MD   54 mg at 03/02/21 1037    HYDROcodone-acetaminophen (NORCO) 5-325 MG per tablet 1 tablet  1 tablet Oral Q6H PRN Bernadine Perez MD   1 tablet at 03/01/21 0902    rivaroxaban (XARELTO) tablet 15 mg  15 mg Oral Daily with breakfast Bernadine Perez MD   15 mg at 03/02/21 1037    atorvastatin (LIPITOR) tablet 20 mg  20 mg Oral Daily Bernadine Perez MD   20 mg at 03/02/21 1037    sodium chloride flush 0.9 % injection 10 mL  10 mL Intravenous 2 times per day Kirby Cotter MD   10 mL at 03/02/21 1038    sodium chloride flush 0.9 % injection 10 mL  10 mL Intravenous PRN Kirby Cotter MD        promethazine (PHENERGAN) tablet 12.5 mg  12.5 mg Oral Q6H PRN Kirby Cotter MD   12.5 mg at 03/01/21 0919    Or    ondansetron (ZOFRAN) injection 4 mg  4 mg Intravenous Q6H PRN Kirby Cotter MD   4 mg at 03/01/21 1711    acetaminophen (TYLENOL) tablet 650 mg  650 mg Oral Q6H PRN Kirby Cotter MD   650 mg at 02/28/21 1512    Or    acetaminophen (TYLENOL) suppository 650 mg  650 mg Rectal Q6H PRN Kirby Cotter MD        0.9 % sodium chloride infusion   Intravenous Continuous Kirby Cotter MD 75 mL/hr at 03/01/21 2152 New Bag at 03/01/21 2152    pantoprazole (PROTONIX) injection 40 mg  40 mg Intravenous Daily Kirby Cotter MD   40 mg at 03/02/21 1036     No Known Allergies  Active Problems:    Colitis  Resolved Problems:    * No resolved hospital problems. *    Blood pressure (!) 168/93, pulse 83, temperature 99.2 °F (37.3 °C), temperature source Oral, resp. rate 16, height 5' 11\" (1.803 m), weight 180 lb 9.6 oz (81.9 kg), SpO2 94 %. Subjective:  Symptoms:  Improved. Pain:  He complains of pain that is mild. Objective:  General Appearance: In no acute distress and not in pain. Vital signs: (most recent): Blood pressure (!) 168/93, pulse 83, temperature 99.2 °F (37.3 °C), temperature source Oral, resp. rate 16, height 5' 11\" (1.803 m), weight 180 lb 9.6 oz (81.9 kg), SpO2 94 %. Heart: Normal rate. Regular rhythm. S1 normal and S2 normal.    Abdomen: Abdomen is soft. Bowel sounds are normal.   There is left upper quadrant tenderness.       Assessment & Plan  75 yo w HTN, CKD and SMV and PV thrombosis since 2/2018, on Xarelto, admitted w h/o persistent abd pain and ischemic colitis in prox descending colon and splenic flexure confirmed by colonoscopy/Bx on 2/15/21,

## 2021-03-02 NOTE — PROGRESS NOTES
Hospitalist Progress Note      PCP: Soniya BERNAL DO    Date of Admission: 2/24/2021      Subjective: continued poor PO intake. Moving towards starting TPN tomorrow morning. Continuing IV abx for now      Medications:  Reviewed    Infusion Medications    sodium chloride 75 mL/hr at 03/01/21 1715     Scheduled Medications    cloNIDine  0.1 mg Oral BID    amLODIPine  5 mg Oral Once    [START ON 3/2/2021] amLODIPine  10 mg Oral Daily    bisacodyl  10 mg Oral Daily    mesalamine  800 mg Oral TID    piperacillin-tazobactam  4,500 mg Intravenous Q8H    fenofibrate  54 mg Oral Daily    rivaroxaban  15 mg Oral Daily with breakfast    atorvastatin  20 mg Oral Daily    sodium chloride flush  10 mL Intravenous 2 times per day    pantoprazole  40 mg Intravenous Daily     PRN Meds: hydrALAZINE, melatonin, morphine **OR** morphine, HYDROcodone-acetaminophen, sodium chloride flush, promethazine **OR** ondansetron, acetaminophen **OR** acetaminophen      Intake/Output Summary (Last 24 hours) at 3/1/2021 2107  Last data filed at 3/1/2021 1722  Gross per 24 hour   Intake 1100 ml   Output --   Net 1100 ml       Exam:    BP (!) 170/89   Pulse 72   Temp 98.6 °F (37 °C) (Oral)   Resp 15   Ht 5' 11\" (1.803 m)   Wt 180 lb 9.6 oz (81.9 kg)   SpO2 94%   BMI 25.19 kg/m²     General appearance: No apparent distress, appears stated age and cooperative. HEENT: Pupils equal, round, and reactive to light. Conjunctivae/corneas clear. Neck: Supple, with full range of motion. No jugular venous distention. Trachea midline. Respiratory:  Normal respiratory effort. Clear to auscultation, bilaterally without Rales/Wheezes/Rhonchi. Cardiovascular: Regular rate and rhythm with normal S1/S2 without murmurs, rubs or gallops. Abdomen: Soft, non-tender, non-distended with normal bowel sounds. Musculoskeletal: No clubbing, cyanosis or edema bilaterally. Full range of motion without deformity.   Skin: Skin color, texture, turgor normal.  No rashes or lesions. Neurologic:  Neurovascularly intact without any focal sensory/motor deficits. Cranial nerves: II-XII intact, grossly non-focal.  Psychiatric: Alert and oriented, thought content appropriate, normal insight  Capillary Refill: Brisk,< 3 seconds   Peripheral Pulses: +2 palpable, equal bilaterally       Labs:   Recent Labs     03/01/21  0956   WBC 12.8*   HGB 13.0*   HCT 39.9*        Recent Labs     03/01/21  1133   *   K 3.8   CL 98*   CO2 26   BUN 13   CREATININE 0.8   CALCIUM 8.2*   PHOS 3.2     No results for input(s): AST, ALT, BILIDIR, BILITOT, ALKPHOS in the last 72 hours. No results for input(s): INR in the last 72 hours. No results for input(s): Holm Dmitry in the last 72 hours. Assessment/Plan:    Descending colitis with persistent abdominal pain  - recent C-scope with biopsy showed ischemic colitis  - no improvement with 2 courses of antibiotics outpatient including Cipro and Flagyl  - ID consulted  - GI consulted  - continue zosyn\  - ontinue with Entocort, mesalamine  - Morphine/percocet prn  - general surgery consulted  - no plan for surgery at this time  - due to continued poor PO intake, will start TPN tomorrow    HTN  - poorly controlled  - continue norvasc. 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      Papito Wood MD

## 2021-03-02 NOTE — PROGRESS NOTES
VSS. Pt resting in bed, has been sleeping on/off throughout the day with continued low PO intake. Pt did have blended ckn noodle soup early afternoon, tolerated well with request to advance diet per GI. Low fat tray received around 1500 and remains untouched. Pt denies feeling nauseated, rating abd pain a 3-4/10 when asked. Increased activity encouraged. Pt agreeable to get OOB to chair this evening and possibly walk hallways tomorrow.

## 2021-03-02 NOTE — PROGRESS NOTES
Physician Progress Note      PATIENT:               Segun Gonzales  CSN #:                  [id number]  :                       1955  ADMIT DATE:       2021 1:28 PM  [address] [place] DATE:  RESPONDING  PROVIDER #:        Bernell Merlin MD          QUERY TEXT:    Pt admitted with colitis. If possible, please document the type of colitis in   the medical record. The medical record reflects the following:  Risk Factors: colitis, abdominal pain  Clinical Indicators: PN- descending colitis with persistent abdominal pain for   several weeks-recent C-scope with biopsy showed ischemic colitis-no   improvement with 2 courses of antibiotics outpatient including Cipro and   Flagyl. Treatment: Zosyn, Entocort, mesalamine,  general surgery and GI consult,   monitor lab/images    Thank You Zaira Person RN, [email or web address]  Options provided:  -- Bacterial Colitis  -- Ischemic Colitis  -- Other - I will add my own diagnosis  -- Disagree - Not applicable / Not valid  -- Disagree - Clinically unable to determine / Unknown  -- Refer to Clinical Documentation Reviewer    PROVIDER RESPONSE TEXT:    This patient has ischemic colitis.     Query created by: Mitchell Woodall on 3/1/2021 9:00 AM      Electronically signed by:  Bernell Merlin MD 3/2/2021 2:08 PM

## 2021-03-02 NOTE — PROGRESS NOTES
Hospitalist Progress Note      PCP: Charlene BERNAL DO    Date of Admission: 2/24/2021      Subjective: continued poor PO intake. Moving towards starting TPN tomorrow morning. Continuing IV abx for now      Medications:  Reviewed    Infusion Medications    PN-Adult Premix 5/20 - Standard Electrolytes - Central Line      sodium chloride 75 mL/hr at 03/01/21 2152     Scheduled Medications    fat emulsion  250 mL Intravenous Once per day on Mon Thu    metoprolol tartrate  25 mg Oral BID    cloNIDine  0.1 mg Oral BID    amLODIPine  10 mg Oral Daily    mesalamine  800 mg Oral TID    piperacillin-tazobactam  4,500 mg Intravenous Q8H    fenofibrate  54 mg Oral Daily    rivaroxaban  15 mg Oral Daily with breakfast    atorvastatin  20 mg Oral Daily    sodium chloride flush  10 mL Intravenous 2 times per day    pantoprazole  40 mg Intravenous Daily     PRN Meds: hydrALAZINE, melatonin, morphine **OR** morphine, HYDROcodone-acetaminophen, sodium chloride flush, promethazine **OR** ondansetron, acetaminophen **OR** acetaminophen      Intake/Output Summary (Last 24 hours) at 3/2/2021 1409  Last data filed at 3/2/2021 0422  Gross per 24 hour   Intake 2902.58 ml   Output --   Net 2902.58 ml       Exam:    BP (!) 164/81   Pulse 74   Temp 98.1 °F (36.7 °C) (Oral)   Resp 14   Ht 5' 11\" (1.803 m)   Wt 180 lb 9.6 oz (81.9 kg)   SpO2 96%   BMI 25.19 kg/m²     General appearance: No apparent distress, appears stated age and cooperative. HEENT: Pupils equal, round, and reactive to light. Conjunctivae/corneas clear. Neck: Supple, with full range of motion. No jugular venous distention. Trachea midline. Respiratory:  Normal respiratory effort. Clear to auscultation, bilaterally without Rales/Wheezes/Rhonchi. Cardiovascular: Regular rate and rhythm with normal S1/S2 without murmurs, rubs or gallops. Abdomen: Soft, non-tender, non-distended with normal bowel sounds.   Musculoskeletal: No clubbing, cyanosis or edema bilaterally. Full range of motion without deformity. Skin: Skin color, texture, turgor normal.  No rashes or lesions. Neurologic:  Neurovascularly intact without any focal sensory/motor deficits. Cranial nerves: II-XII intact, grossly non-focal.  Psychiatric: Alert and oriented, thought content appropriate, normal insight  Capillary Refill: Brisk,< 3 seconds   Peripheral Pulses: +2 palpable, equal bilaterally       Labs:   Recent Labs     03/01/21  0956   WBC 12.8*   HGB 13.0*   HCT 39.9*        Recent Labs     03/01/21  1133 03/02/21  1043   * 132*   K 3.8 3.7   CL 98* 98*   CO2 26 27   BUN 13 16   CREATININE 0.8 1.0   CALCIUM 8.2* 8.4   PHOS 3.2 2.1*     No results for input(s): AST, ALT, BILIDIR, BILITOT, ALKPHOS in the last 72 hours. No results for input(s): INR in the last 72 hours. No results for input(s): Sarah Ends in the last 72 hours. Assessment/Plan:    Descending colitis with persistent abdominal pain  - recent C-scope with biopsy showed ischemic colitis  - no improvement with 2 courses of antibiotics outpatient including Cipro and Flagyl  - ID consulted  - GI consulted  - continue zosyn. Will likely need outpatient IV abx  - continue with Entocort, mesalamine  - Morphine/percocet prn  - general surgery consulted  - no plan for surgery at this time  - TPN ordered. May only need for a few days if PO intake improves    HTN  - poorly controlled  - continue norvasc.  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

## 2021-03-02 NOTE — PROGRESS NOTES
Nephrology progress Note                                                                                                                                                                                                                                                                                                                                                               Office : [phone]     Fax :[phone]              Patient's Name: Espinoza Guevara  1:54 PM  3/2/2021    Reason for Consult:   CKD , need for IV access       Requesting Physician:  Jimmie Christina DO      Chief Complaint:  Abdominal pain     History of Present Ilness:    Espinoza Guevara is a 76 y.o. male  With PMH of HTN , CKD , SMV and PV thrombosis since 2/18 , on xarelto c/w c/o abdominal pain    2/15 colonoscopy : ischemic colitis     C/o nausea      Interval history    Abdominal pain improving  On antibiotics    BP running high : 175/83 , 168/93     Past Medical History:   Diagnosis Date    Hx of blood clots     Hyperlipidemia     Hypertension     PONV (postoperative nausea and vomiting)     Symptomatic cholelithiasis     Thrombosis        Past Surgical History:   Procedure Laterality Date    ANKLE SURGERY      left    CHOLECYSTECTOMY, LAPAROSCOPIC N/A 8/13/2020    LAPAROSCOPIC CHOLECYSTECTOMY WITH INTRAOPERATIVE CHOLANGIOGRAM performed by Susu Lomeli MD at [address] [place] [address]  03/08/2017    SHOULDER SURGERY Right 2012    TONSILLECTOMY      WRIST SURGERY      right       Family History   Problem Relation Age of Onset    Breast Cancer Mother     Coronary Art Dis Father     Diabetes Sister         reports that he has never smoked. He has never used smokeless tobacco. He reports current alcohol use. He reports that he does not use drugs. Allergies:  Patient has no known allergies.     Current Medications:        PN-Adult Premix 5/20 - Standard Electrolytes - Central Line, Continuous TPN      fat emulsion 20 % infusion 250 mL, Once per day on Mon Thu      metoprolol tartrate (LOPRESSOR) tablet 25 mg, BID      cloNIDine (CATAPRES) tablet 0.1 mg, BID      amLODIPine (NORVASC) tablet 10 mg, Daily      hydrALAZINE (APRESOLINE) tablet 50 mg, TID PRN      mesalamine (DELZICOL) delayed release capsule 800 mg, TID      melatonin disintegrating tablet 5 mg, Nightly PRN      piperacillin-tazobactam (ZOSYN) 4,500 mg in dextrose 5 % 100 mL IVPB (mini-bag), Q8H      morphine (PF) injection 2 mg, Q2H PRN    Or      morphine (PF) injection 4 mg, Q2H PRN      fenofibrate (TRICOR) tablet 54 mg, Daily      HYDROcodone-acetaminophen (NORCO) 5-325 MG per tablet 1 tablet, Q6H PRN      rivaroxaban (XARELTO) tablet 15 mg, Daily with breakfast      atorvastatin (LIPITOR) tablet 20 mg, Daily      sodium chloride flush 0.9 % injection 10 mL, 2 times per day      sodium chloride flush 0.9 % injection 10 mL, PRN      promethazine (PHENERGAN) tablet 12.5 mg, Q6H PRN    Or      ondansetron (ZOFRAN) injection 4 mg, Q6H PRN      acetaminophen (TYLENOL) tablet 650 mg, Q6H PRN    Or      acetaminophen (TYLENOL) suppository 650 mg, Q6H PRN      0.9 % sodium chloride infusion, Continuous      pantoprazole (PROTONIX) injection 40 mg, Daily        Review of Systems:   14 point ROS obtained but were negative except mentioned in HPI      Physical exam:     Vitals:  BP (!) 168/93   Pulse 83   Temp 99.2 °F (37.3 °C) (Oral)   Resp 16   Ht 5' 11\" (1.803 m)   Wt 180 lb 9.6 oz (81.9 kg)   SpO2 94%   BMI 25.19 kg/m²   Constitutional:  OAA X3 NAD  Skin: no rash, turgor wnl  Heent:  eomi, mmm  Neck: no bruits or jvd noted  Cardiovascular:  S1, S2 without m/r/g  Respiratory: CTA B without w/r/r  Abdomen:  +bs, soft, nt, nd  Ext: no  lower extremity edema  Psychiatric: mood and affect appropriate  Musculoskeletal:  Rom, muscular strength intact    Data:   Labs:  CBC:   Recent Labs     03/01/21  0956   WBC 12.8*   HGB 13.0*    BMP:    Recent Labs     03/01/21  1133 03/02/21  1043   * 132*   K 3.8 3.7   CL 98* 98*   CO2 26 27   BUN 13 16   CREATININE 0.8 1.0   GLUCOSE 140* 131*     Ca/Mg/Phos:   Recent Labs     03/01/21  1133 03/02/21  1043   CALCIUM 8.2* 8.4   MG 1.90 1.80   PHOS 3.2 2.1*     Hepatic: No results for input(s): AST, ALT, ALB, BILITOT, ALKPHOS in the last 72 hours. Troponin: No results for input(s): TROPONINI in the last 72 hours. BNP: No results for input(s): BNP in the last 72 hours. Lipids:   Recent Labs     03/01/21  1133   TRIG 74     ABGs: No results for input(s): PHART, PO2ART, YAE6GAJ in the last 72 hours. INR: No results for input(s): INR in the last 72 hours. UA:No results for input(s): Radha Chevy, GLUCOSEU, BILIRUBINUR, Lori Tana, BLOODU, PHUR, PROTEINU, UROBILINOGEN, NITRU, LEUKOCYTESUR, LABMICR, URINETYPE in the last 72 hours. Urine Microscopic: No results for input(s): LABCAST, BACTERIA, COMU, HYALCAST, WBCUA, RBCUA, EPIU in the last 72 hours. Urine Culture: No results for input(s): LABURIN in the last 72 hours. Urine Chemistry: No results for input(s): Rose Sevin, PROTEINUR, NAUR in the last 72 hours. IMAGING:  IR PICC WO SQ PORT/PUMP > 5 YEARS   Preliminary Result   Status post placement of right upper extremity PICC line with distal tip   located in region of junction of superior vena cava right atrium. XR ACUTE ABD SERIES CHEST 1 VW   Final Result   1. Dilated loops of colon in a pattern more consistent with obstruction. No   free air. 2. Unremarkable chest radiograph. CTA ABDOMEN PELVIS W CONTRAST   Final Result   Colitis of the descending colon. The mesenteric and colic arteries are   patent, and there are no secondary findings of ischemia such as pneumatosis,   mesenteric/portal venous gas, or pneumoperitoneum             Assessment/Plan       1.   CKD stage 2/3        Serum cr at baseline         GFR > 60           Ok to get PICC line on dominant arm if absolute requirement for IV access                   2. HTN     Amlodipine 10 mg daily     Add Metoprolol 25 mg bid    Cont clonidine    Add hydralazine prn       Will follow up in Kidney clinic in 1 month        Thank you for allowing us to participate in care of Yariel Miles free to contact me   [facility] [address]  Office : [phone]  Fax :[phone]

## 2021-03-02 NOTE — PROGRESS NOTES
Assessment completed and documented. VSS. A/ox4. Denies pain at this time. R picc infusing antibiotics. Bed locked and in lowest position. Bedside table and call light within reach. Denies further needs at this time.

## 2021-03-02 NOTE — PROGRESS NOTES
Infectious Disease Follow up Notes    CC :  Descending colitis     Antibiotics:   Zosyn 4.5 q8     Admit Date:   2/24/2021  Hospital Day: 7    Subjective:   Has tolerated clears today. Had several bowel movements, overall feeling better today, less pain. Objective:     Patient Vitals for the past 8 hrs:   BP Temp Temp src Pulse Resp SpO2   03/02/21 0845 (!) 164/81 98.1 °F (36.7 °C) Oral 74 14 96 %       EXAM:  General:  Alert, oriented, NAD     HEENT:  NCAT, PERRL, sclera anicteric  NECK:   supple  LUNGS:   CTA upper lobes   ABD:  Distended, +tympany. Less tender.   Bowel sounds present  EXT:  No focal rash       LINE: PICC RUE in place, site ok, date of placement 3/1/21       Scheduled Meds:   fat emulsion  250 mL Intravenous Once per day on Mon Thu    metoprolol tartrate  25 mg Oral BID    cloNIDine  0.1 mg Oral BID    amLODIPine  10 mg Oral Daily    mesalamine  800 mg Oral TID    piperacillin-tazobactam  4,500 mg Intravenous Q8H    fenofibrate  54 mg Oral Daily    rivaroxaban  15 mg Oral Daily with breakfast    atorvastatin  20 mg Oral Daily    sodium chloride flush  10 mL Intravenous 2 times per day    pantoprazole  40 mg Intravenous Daily       Continuous Infusions:   PN-Adult Premix 5/20 - Standard Electrolytes - Central Line      sodium chloride 75 mL/hr at 03/01/21 2152          Data Review:    Lab Results   Component Value Date    WBC 12.8 (H) 03/01/2021    HGB 13.0 (L) 03/01/2021    HCT 39.9 (L) 03/01/2021    MCV 81.3 03/01/2021     03/01/2021     Lab Results   Component Value Date    CREATININE 1.0 03/02/2021    BUN 16 03/02/2021     (L) 03/02/2021    K 3.7 03/02/2021    CL 98 (L) 03/02/2021    CO2 27 03/02/2021       Hepatic Function Panel:   Lab Results   Component Value Date    ALKPHOS 61 02/26/2021    ALT 14 02/26/2021    AST 14 02/26/2021    PROT 6.2 02/26/2021    BILITOT 0.6 02/26/2021 Zi Serrano <0.2 02/20/2020    IBILI see below 02/20/2020    LABALBU 3.3 02/26/2021       Radiology Review:  All pertinent images / reports were reviewed as a part of this visit. CTA a/p 2/24/21   Impression   Colitis of the descending colon.  The mesenteric and colic arteries are   patent, and there are no secondary findings of ischemia such as pneumatosis,   mesenteric/portal venous gas, or pneumoperitoneum      CT a/p 2/21/21  Impression   Slightly increased severity of colitis involving the descending colon which   is most likely infectious or inflammatory, less likely ischemic.  No evidence   of complication of the colitis.      CT a/p 2/6/21  Impression   Findings compatible with nonspecific infectious/inflammatory colitis   involving distal splenic flexure through distal descending colon.  No   evidence for bowel obstruction, perforation or abscess formation.        Assessment:     Patient Active Problem List    Diagnosis Date Noted    Colitis 02/24/2021    Murmur 09/16/2020    Symptomatic cholelithiasis 08/11/2020    Stage 3 chronic kidney disease 03/09/2020    Chronic abdominal pain 04/30/2018    Essential hypertension 02/26/2018    Mixed hyperlipidemia 02/26/2018    Mesenteric vein thrombosis ([facility] [address] 02/20/2018       Focal descending colitis, acute onset ~5 weeks ago  Path from outpatient colonoscopy c/w ischemic colitis per Dr. Saroj Ramirez. CTA this admission with patent mesenteric and colic arteries.     Did not seem to improve with cipro/flagyl/budesonide      -continue Zosyn for now  -if he continues to improve, could consider change to ertapenem at [facility], plan to continue a couple of weeks and repeat CT       Discussed with patient/family, all questions answered        Marisel Massey MD  Phone: [phone]   Fax : [phone]

## 2021-03-02 NOTE — CARE COORDINATION
Referral received to check IV Benefits. Patients benefit information is as follows: Therapy:Zosyn 4.5g Q8    Pt.  Copay:$187/week for Zosyn    Electronically signed by Marina Paul on 3/2/2021 at 9:58 AM   Cell Ph# [phone], Office # [phone]

## 2021-03-02 NOTE — PLAN OF CARE
Pt remained free of falls. Call light within reach. A/ox4, calls out appropriately. Non skid footwear in place. Bed locked and in lowest position.

## 2021-03-02 NOTE — PROGRESS NOTES
[facility] SURGERY    PATIENT NAME: Zamzam Uriostegui     TODAY'S DATE: 3/2/2021    CHIEF COMPLAINT: Abdominal pain    INTERVAL HISTORY/HPI:    Pt reports pain some better this AM, no nausea today but had emesis last evening and afternoon. Had several BMs yesterday     REVIEW OF SYSTEMS:  Pertinent positives and negatives as per interval history section    OBJECTIVE:  VITALS:  BP (!) 168/93   Pulse 83   Temp 99.2 °F (37.3 °C) (Oral)   Resp 16   Ht 5' 11\" (1.803 m)   Wt 180 lb 9.6 oz (81.9 kg)   SpO2 94%   BMI 25.19 kg/m²     INTAKE/OUTPUT:    I/O last 3 completed shifts: In: 3192.6 [P.O.:480; I.V.:2612.6; IV Piggyback:100]  Out: -   No intake/output data recorded. CONSTITUTIONAL:  awake and alert  LUNGS:  Respirations easy and unlabored, clear to auscultation  CARD:  regular rate and rhythm  ABDOMEN:  normal bowel sounds, soft, non-distended, tenderness noted left mid abdomen    Data:  CBC:   Recent Labs     03/01/21  0956   WBC 12.8*   HGB 13.0*   HCT 39.9*        BMP:    Recent Labs     03/01/21  1133 03/02/21  1043   * 132*   K 3.8 3.7   CL 98* 98*   CO2 26 27   BUN 13 16   CREATININE 0.8 1.0   GLUCOSE 140* 131*     Hepatic: No results for input(s): AST, ALT, ALB, BILITOT, ALKPHOS in the last 72 hours. Mag:      Recent Labs     03/01/21  1133 03/02/21  1043   MG 1.90 1.80      Phos:     Recent Labs     03/01/21  1133 03/02/21  1043   PHOS 3.2 2.1*          ASSESSMENT AND PLAN:  60-year-old with descending colitis. Slowly improving. Continue with current antibiotics, liquids. Electronically signed by JOLANTA Cooper [facility]     Surgery Staff    I have examined this patient and read and agree with the note by Ally Almanza CNP from today. Seems to be steadily improving, bowel function resuming. Hopefully he can tolerated advancing diet over next few days, and then be d/c'd home.   Likely would benefit from repeat interval CT in next few weeks to assess for improvement in the inflamed portion of colon.     MERT [employer] [facility] THOMAS

## 2021-03-03 LAB
ANION GAP SERPL CALCULATED.3IONS-SCNC: 8 MMOL/L (ref 3–16)
BUN BLDV-MCNC: 15 MG/DL (ref 7–20)
CALCIUM SERPL-MCNC: 8.1 MG/DL (ref 8.3–10.6)
CHLORIDE BLD-SCNC: 101 MMOL/L (ref 99–110)
CO2: 27 MMOL/L (ref 21–32)
CREAT SERPL-MCNC: 0.9 MG/DL (ref 0.8–1.3)
GFR AFRICAN AMERICAN: >60
GFR NON-AFRICAN AMERICAN: >60
GLUCOSE BLD-MCNC: 104 MG/DL (ref 70–99)
GLUCOSE BLD-MCNC: 123 MG/DL (ref 70–99)
GLUCOSE BLD-MCNC: 124 MG/DL (ref 70–99)
GLUCOSE BLD-MCNC: 137 MG/DL (ref 70–99)
GLUCOSE BLD-MCNC: 142 MG/DL (ref 70–99)
GLUCOSE BLD-MCNC: 156 MG/DL (ref 70–99)
MAGNESIUM: 1.9 MG/DL (ref 1.8–2.4)
PERFORMED ON: ABNORMAL
PHOSPHORUS: 2.7 MG/DL (ref 2.5–4.9)
POTASSIUM SERPL-SCNC: 3.5 MMOL/L (ref 3.5–5.1)
SODIUM BLD-SCNC: 136 MMOL/L (ref 136–145)

## 2021-03-03 PROCEDURE — 6370000000 HC RX 637 (ALT 250 FOR IP): Performed by: INTERNAL MEDICINE

## 2021-03-03 PROCEDURE — APPSS45 APP SPLIT SHARED TIME 31-45 MINUTES: Performed by: CLINICAL NURSE SPECIALIST

## 2021-03-03 PROCEDURE — 99231 SBSQ HOSP IP/OBS SF/LOW 25: CPT | Performed by: SURGERY

## 2021-03-03 PROCEDURE — 80048 BASIC METABOLIC PNL TOTAL CA: CPT

## 2021-03-03 PROCEDURE — 2500000003 HC RX 250 WO HCPCS: Performed by: INTERNAL MEDICINE

## 2021-03-03 PROCEDURE — 2580000003 HC RX 258: Performed by: INTERNAL MEDICINE

## 2021-03-03 PROCEDURE — 36592 COLLECT BLOOD FROM PICC: CPT

## 2021-03-03 PROCEDURE — 1200000000 HC SEMI PRIVATE

## 2021-03-03 PROCEDURE — 6360000002 HC RX W HCPCS: Performed by: INTERNAL MEDICINE

## 2021-03-03 PROCEDURE — 6370000000 HC RX 637 (ALT 250 FOR IP): Performed by: HOSPITALIST

## 2021-03-03 PROCEDURE — 84100 ASSAY OF PHOSPHORUS: CPT

## 2021-03-03 PROCEDURE — 83735 ASSAY OF MAGNESIUM: CPT

## 2021-03-03 RX ORDER — PANTOPRAZOLE SODIUM 40 MG/1
40 TABLET, DELAYED RELEASE ORAL
Status: DISCONTINUED | OUTPATIENT
Start: 2021-03-04 | End: 2021-03-04 | Stop reason: HOSPADM

## 2021-03-03 RX ADMIN — HYDROCODONE BITARTRATE AND ACETAMINOPHEN 1 TABLET: 5; 325 TABLET ORAL at 04:47

## 2021-03-03 RX ADMIN — MESALAMINE 800 MG: 400 CAPSULE, DELAYED RELEASE ORAL at 22:19

## 2021-03-03 RX ADMIN — METOPROLOL TARTRATE 25 MG: 25 TABLET, FILM COATED ORAL at 22:19

## 2021-03-03 RX ADMIN — AMLODIPINE BESYLATE 10 MG: 5 TABLET ORAL at 09:24

## 2021-03-03 RX ADMIN — SODIUM CHLORIDE, PRESERVATIVE FREE 10 ML: 5 INJECTION INTRAVENOUS at 00:06

## 2021-03-03 RX ADMIN — MESALAMINE 800 MG: 400 CAPSULE, DELAYED RELEASE ORAL at 15:34

## 2021-03-03 RX ADMIN — PIPERACILLIN SODIUM AND TAZOBACTAM SODIUM 4500 MG: 4; .5 INJECTION, POWDER, LYOPHILIZED, FOR SOLUTION INTRAVENOUS at 04:48

## 2021-03-03 RX ADMIN — FENOFIBRATE 54 MG: 54 TABLET ORAL at 09:24

## 2021-03-03 RX ADMIN — MESALAMINE 800 MG: 400 CAPSULE, DELAYED RELEASE ORAL at 00:08

## 2021-03-03 RX ADMIN — RIVAROXABAN 15 MG: 15 TABLET, FILM COATED ORAL at 09:24

## 2021-03-03 RX ADMIN — METOPROLOL TARTRATE 25 MG: 25 TABLET, FILM COATED ORAL at 09:25

## 2021-03-03 RX ADMIN — SODIUM CHLORIDE, PRESERVATIVE FREE 10 ML: 5 INJECTION INTRAVENOUS at 09:25

## 2021-03-03 RX ADMIN — PIPERACILLIN SODIUM AND TAZOBACTAM SODIUM 4500 MG: 4; .5 INJECTION, POWDER, LYOPHILIZED, FOR SOLUTION INTRAVENOUS at 12:51

## 2021-03-03 RX ADMIN — PIPERACILLIN SODIUM AND TAZOBACTAM SODIUM 4500 MG: 4; .5 INJECTION, POWDER, LYOPHILIZED, FOR SOLUTION INTRAVENOUS at 22:14

## 2021-03-03 RX ADMIN — SODIUM CHLORIDE: 9 INJECTION, SOLUTION INTRAVENOUS at 12:05

## 2021-03-03 RX ADMIN — CLONIDINE HYDROCHLORIDE 0.1 MG: 0.1 TABLET ORAL at 09:24

## 2021-03-03 RX ADMIN — MESALAMINE 800 MG: 400 CAPSULE, DELAYED RELEASE ORAL at 09:24

## 2021-03-03 RX ADMIN — CLONIDINE HYDROCHLORIDE 0.1 MG: 0.1 TABLET ORAL at 22:19

## 2021-03-03 RX ADMIN — HYDROCODONE BITARTRATE AND ACETAMINOPHEN 1 TABLET: 5; 325 TABLET ORAL at 22:22

## 2021-03-03 RX ADMIN — ASCORBIC ACID, VITAMIN A PALMITATE, CHOLECALCIFEROL, THIAMINE HYDROCHLORIDE, RIBOFLAVIN-5 PHOSPHATE SODIUM, PYRIDOXINE HYDROCHLORIDE, NIACINAMIDE, DEXPANTHENOL, ALPHA-TOCOPHEROL ACETATE, VITAMIN K1, FOLIC ACID, BIOTIN, CYANOCOBALAMIN: 200; 3300; 200; 6; 3.6; 6; 40; 15; 10; 150; 600; 60; 5 INJECTION, SOLUTION INTRAVENOUS at 18:03

## 2021-03-03 RX ADMIN — ATORVASTATIN CALCIUM 20 MG: 10 TABLET, FILM COATED ORAL at 09:24

## 2021-03-03 NOTE — PROGRESS NOTES
Pt is alert and oriented. VSS. RA. Pt c/o 5/10 pain. Pt given PRN pain medication per MAR. Shift assessment completed and documented. Call light within reach. Bed side table within reach. Wheels locked. Bed in lowest position. Pt instructed to call out for assistance. Pt expressesed understanding & calls out appropritately. All care per orders. Will continue to monitor.  Electronically signed by Rob Mitchell RN on 3/3/2021 at 1:52 AM

## 2021-03-03 NOTE — CARE COORDINATION
Chart reviewed day 7. Care managed per surgery, GI and IM. Continues IVATBX and TPN. Poor po intake. Plan for IVATBX at discharge. [address] and [employer] following.  Will have [employer] run benefits for ertapenem at d/c(per ID notes)Kathyrn Avelino Oppenheim, RN

## 2021-03-03 NOTE — PROGRESS NOTES
Hospitalist Progress Note      PCP: Nelia BERNAL DO    Date of Admission: 2/24/2021      Subjective: continued poor PO intake. Moving towards starting TPN tomorrow morning. Continuing IV abx for now      Medications:  Reviewed    Infusion Medications    PN-Adult Premix 5/20 - Standard Electrolytes - Central Line      PN-Adult Premix 5/20 - Standard Electrolytes - Central Line 35 mL/hr at 03/02/21 1851    dextrose      sodium chloride 75 mL/hr at 03/02/21 1850     Scheduled Medications    [START ON 3/4/2021] pantoprazole  40 mg Oral QAM AC    fat emulsion  250 mL Intravenous Once per day on Mon Thu    metoprolol tartrate  25 mg Oral BID    insulin lispro  0-12 Units Subcutaneous TID WC    insulin lispro  0-6 Units Subcutaneous Nightly    cloNIDine  0.1 mg Oral BID    amLODIPine  10 mg Oral Daily    mesalamine  800 mg Oral TID    piperacillin-tazobactam  4,500 mg Intravenous Q8H    fenofibrate  54 mg Oral Daily    rivaroxaban  15 mg Oral Daily with breakfast    atorvastatin  20 mg Oral Daily    sodium chloride flush  10 mL Intravenous 2 times per day     PRN Meds: glucose, dextrose, glucagon (rDNA), dextrose, hydrALAZINE, melatonin, morphine **OR** morphine, HYDROcodone-acetaminophen, sodium chloride flush, promethazine **OR** ondansetron, acetaminophen **OR** acetaminophen      Intake/Output Summary (Last 24 hours) at 3/3/2021 1047  Last data filed at 3/3/2021 0205  Gross per 24 hour   Intake 2199.52 ml   Output --   Net 2199.52 ml       Exam:    BP (!) 158/77   Pulse 61   Temp 98.6 °F (37 °C) (Oral)   Resp 16   Ht 5' 11\" (1.803 m)   Wt 180 lb 9.6 oz (81.9 kg)   SpO2 95%   BMI 25.19 kg/m²     General appearance: No apparent distress, appears stated age and cooperative. HEENT: Pupils equal, round, and reactive to light. Conjunctivae/corneas clear. Neck: Supple, with full range of motion. No jugular venous distention. Trachea midline. Respiratory:  Normal respiratory effort. Clear to auscultation, bilaterally without Rales/Wheezes/Rhonchi. Cardiovascular: Regular rate and rhythm with normal S1/S2 without murmurs, rubs or gallops. Abdomen: Soft, non-tender, non-distended with normal bowel sounds. Musculoskeletal: No clubbing, cyanosis or edema bilaterally. Full range of motion without deformity. Skin: Skin color, texture, turgor normal.  No rashes or lesions. Neurologic:  Neurovascularly intact without any focal sensory/motor deficits. Cranial nerves: II-XII intact, grossly non-focal.  Psychiatric: Alert and oriented, thought content appropriate, normal insight  Capillary Refill: Brisk,< 3 seconds   Peripheral Pulses: +2 palpable, equal bilaterally       Labs:   Recent Labs     03/01/21  0956   WBC 12.8*   HGB 13.0*   HCT 39.9*        Recent Labs     03/01/21  1133 03/02/21  1043 03/03/21  0656   * 132* 136   K 3.8 3.7 3.5   CL 98* 98* 101   CO2 26 27 27   BUN 13 16 15   CREATININE 0.8 1.0 0.9   CALCIUM 8.2* 8.4 8.1*   PHOS 3.2 2.1* 2.7     No results for input(s): AST, ALT, BILIDIR, BILITOT, ALKPHOS in the last 72 hours. No results for input(s): INR in the last 72 hours. No results for input(s): Harriet Burdock in the last 72 hours. Assessment/Plan:    Descending colitis, likely ischemic  - recent C-scope with biopsy showed ischemic colitis  - no improvement with 2 courses of antibiotics outpatient including Cipro and Flagyl  - ID consulted  - GI consulted  - continue zosyn. May need outpatient ertapenem  - continue with Entocort, mesalamine  - Morphine/percocet prn  - general surgery consulted  - no plan for surgery at this time  - diet advanced but still with poor PO intake  - TPN ordered. Will hopefully be able to wean off in 1-2 days    HTN  - poorly controlled  - continue norvasc.  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

## 2021-03-03 NOTE — PROGRESS NOTES
Ericka Penn is a 76 y.o. male patient.     Current Facility-Administered Medications   Medication Dose Route Frequency Provider Last Rate Last Admin    PN-Adult Premix 5/20 - Standard Electrolytes - Central Line   Intravenous Continuous TPN Randee Kendall MD 35 mL/hr at 03/02/21 1851 New Bag at 03/02/21 1851    fat emulsion 20 % infusion 250 mL  250 mL Intravenous Once per day on Mon Thu Randee Kendall MD   Stopped at 03/03/21 0736    metoprolol tartrate (LOPRESSOR) tablet 25 mg  25 mg Oral BID Luke Gimenez MD   25 mg at 03/02/21 2031    insulin lispro (HUMALOG) injection vial 0-12 Units  0-12 Units Subcutaneous TID WC JOLANTA Fernandez CNP        insulin lispro (HUMALOG) injection vial 0-6 Units  0-6 Units Subcutaneous Nightly JOLANTA Fernandez CNP   1 Units at 03/02/21 2036    glucose (GLUTOSE) 40 % oral gel 15 g  15 g Oral PRN JOLANTA Fernandez CNP        dextrose 50 % IV solution  12.5 g Intravenous PRN JOLANTA Fernandez CNP        glucagon (rDNA) injection 1 mg  1 mg Intramuscular PRN JOLANTA Fernandez CNP        dextrose 5 % solution  100 mL/hr Intravenous PRN JOLANTA Fernandez CNP        cloNIDine (CATAPRES) tablet 0.1 mg  0.1 mg Oral BID Randee Kendall MD   0.1 mg at 03/02/21 2031    amLODIPine (NORVASC) tablet 10 mg  10 mg Oral Daily Luke Gimenez MD   10 mg at 03/02/21 1037    hydrALAZINE (APRESOLINE) tablet 50 mg  50 mg Oral TID PRN Luke Gimenez MD        mesalamine (DELZICOL) delayed release capsule 800 mg  800 mg Oral TID Inga Daniels MD   800 mg at 03/03/21 0008    melatonin disintegrating tablet 5 mg  5 mg Oral Nightly PRN JOLANTA Fernandez CNP   5 mg at 02/27/21 2252    piperacillin-tazobactam (ZOSYN) 4,500 mg in dextrose 5 % 100 mL IVPB (mini-bag)  4,500 mg Intravenous Guero Hwang MD   Stopped at 03/03/21 5477    morphine (PF) injection 2 mg  2 mg Intravenous Q2H PRN Jun Cody MD   2 mg at 03/02/21 4467    Or    morphine (PF) injection 4 mg  4 mg Intravenous Q2H PRN Angelo Dueñas MD   4 mg at 03/02/21 2218    fenofibrate (TRICOR) tablet 54 mg  54 mg Oral Daily Naty Ledesma MD   54 mg at 03/02/21 1037    HYDROcodone-acetaminophen (NORCO) 5-325 MG per tablet 1 tablet  1 tablet Oral Q6H PRN Naty Ledesma MD   1 tablet at 03/03/21 0447    rivaroxaban (XARELTO) tablet 15 mg  15 mg Oral Daily with breakfast Naty Ledesma MD   15 mg at 03/02/21 1037    atorvastatin (LIPITOR) tablet 20 mg  20 mg Oral Daily Naty Ledesma MD   20 mg at 03/02/21 1037    sodium chloride flush 0.9 % injection 10 mL  10 mL Intravenous 2 times per day Naty Ledesma MD   10 mL at 03/03/21 0006    sodium chloride flush 0.9 % injection 10 mL  10 mL Intravenous PRN Naty Ledesma MD        promethazine (PHENERGAN) tablet 12.5 mg  12.5 mg Oral Q6H PRN Naty Ledesma MD   12.5 mg at 03/01/21 0919    Or    ondansetron (ZOFRAN) injection 4 mg  4 mg Intravenous Q6H PRN Naty Ledesma MD   4 mg at 03/01/21 1711    acetaminophen (TYLENOL) tablet 650 mg  650 mg Oral Q6H PRN Naty Ledesma MD   650 mg at 02/28/21 1512    Or    acetaminophen (TYLENOL) suppository 650 mg  650 mg Rectal Q6H PRN Naty Ledesma MD        0.9 % sodium chloride infusion   Intravenous Continuous Naty Ledesma MD 75 mL/hr at 03/02/21 1850 New Bag at 03/02/21 1850    pantoprazole (PROTONIX) injection 40 mg  40 mg Intravenous Daily Naty Ledesma MD   40 mg at 03/02/21 1036     No Known Allergies  Active Problems:    Colitis  Resolved Problems:    * No resolved hospital problems. *    Blood pressure (!) 144/76, pulse 58, temperature 98.7 °F (37.1 °C), temperature source Oral, resp. rate 17, height 5' 11\" (1.803 m), weight 180 lb 9.6 oz (81.9 kg), SpO2 95 %. Subjective:  Symptoms:  Stable. Pain:  He complains of pain that is moderate. Objective:  General Appearance: In no acute distress and not in pain.     Vital signs: (most recent): Blood pressure (!) 144/76, pulse 58, temperature 98.7 °F (37.1 °C), temperature source Oral, resp. rate 17, height 5' 11\" (1.803 m), weight 180 lb 9.6 oz (81.9 kg), SpO2 95 %. Abdomen: Abdomen is soft. Bowel sounds are normal.   There is left upper quadrant tenderness. Assessment & Plan  75 yo w HTN, CKD and SMV and PV thrombosis since 2/2018, on Xarelto, admitted w h/o persistent abd pain and ischemic colitis in prox descending colon and splenic flexure confirmed by colonoscopy/Bx on 2/15/21, refractory to 2 courses of outpatient Abx's. Outpatient stool PCR was neg. Pain and tenderness improved since he had a large loose bm early morning of 3/2.     Plan:   1. Supportive care  2. Agree w IV Abx's  3. Stopped Dulcolax and Miralax  4. Continue PO Mesalamine   5. Advanced diet to low fat  6. Consider partial colectomy if colitis/pain persist/recur  7. Appreciate Surgery's help  8.  Will follow  Jackson Combs MD       [phone]) [phone]    Ajit Cassidy MD  3/3/2021

## 2021-03-03 NOTE — PROGRESS NOTES
Shift assessment completed. Pt A&O x4, VSS. Pt ate aout 25-50% of breakfast, so far tolerating. Pt independent with ambulation. Non skid socks on. Denies any needs at this time. Bed locked and in lowest position. Call light and bedside table within reach. Will continue to monitor.

## 2021-03-03 NOTE — DISCHARGE INSTR - COC
Continuity of Care Form    Patient Name: Ramya Lucero   :  1955  MRN:  [id number]    Admit date:  2021  Discharge date:  3/4/2021    Code Status Order: Full Code   Advance Directives:   [facility] Documentation       Date/Time Healthcare Directive Type of Healthcare Directive Copy in [facility] [address] Agent's Name Healthcare Agent's Phone Number    21  No, patient does not have an advance directive for healthcare treatment -- -- -- -- --            Admitting Physician:  oRhith Velasquez MD  PCP: Marybeth Crawford DO    Discharging Nurse: [facility] Unit/Room#: 0645/7770-16  Discharging Unit Phone Number: [phone]    Emergency Contact:   Extended Emergency Contact Information  Primary Emergency Contact: Jackie aCstillo  Address: [address] Phone: [phone]  Mobile Phone: [phone]  Relation: Spouse    Past Surgical History:  Past Surgical History:   Procedure Laterality Date    ANKLE SURGERY      left    CHOLECYSTECTOMY, LAPAROSCOPIC N/A 2020    LAPAROSCOPIC CHOLECYSTECTOMY WITH INTRAOPERATIVE CHOLANGIOGRAM performed by Yinka Phillips MD at [address] [place] [address]  2017    SHOULDER SURGERY Right 2012    TONSILLECTOMY      WRIST SURGERY      right       Immunization History:   Immunization History   Administered Date(s) Administered    Influenza Virus Vaccine 10/01/2017    Zoster Live (Zostavax) 2016       Active Problems:  Patient Active Problem List   Diagnosis Code    Mesenteric vein thrombosis ([facility] [address] K55.069    Essential hypertension I10    Mixed hyperlipidemia E78.2    Chronic abdominal pain R10.9, G89.29    Stage 3 chronic kidney disease N18.30    Symptomatic cholelithiasis K80.20    Murmur R01.1    Colitis K52.9       Isolation/Infection:   Isolation            No Isolation          Patient Infection Status None to display            Nurse Assessment:  Last Vital Signs: BP (!) 158/77   Pulse 61   Temp 98.6 °F (37 °C) (Oral)   Resp 16   Ht 5' 11\" (1.803 m)   Wt 180 lb 9.6 oz (81.9 kg)   SpO2 95%   BMI 25.19 kg/m²     Last documented pain score (0-10 scale): Pain Level: 5  Last Weight:   Wt Readings from Last 1 Encounters:   02/24/21 180 lb 9.6 oz (81.9 kg)     Mental Status:  oriented and alert    IV Access:  - PICC - site  R Upper Arm, insertion date: 3/1/2021    Nursing Mobility/ADLs:  Walking   Independent  Transfer  Independent  Bathing  Independent  Dressing  Independent  [address]  Assisted  Med Delivery   whole    Wound Care Documentation and Therapy:        Elimination:  Continence:   · Bowel: Yes  · Bladder: Yes  Urinary Catheter: None   Colostomy/Ileostomy/Ileal Conduit: No       Date of Last BM: 3/3/2021    Intake/Output Summary (Last 24 hours) at 3/3/2021 1040  Last data filed at 3/3/2021 0205  Gross per 24 hour   Intake 2199.52 ml   Output --   Net 2199.52 ml     I/O last 3 completed shifts: In: 2199.5 [P.O.:600; I.V.:1403.5; IV Piggyback:100]  Out: -     Safety Concerns:     None    Impairments/Disabilities:      None    Nutrition Therapy:  Current Nutrition Therapy:   - Oral Diet:  Low Fat    Routes of Feeding: Oral  Liquids: No Restrictions  Daily Fluid Restriction: no  Last Modified Barium Swallow with Video (Video Swallowing Test): not done    Treatments at the Time of Hospital Discharge:   Respiratory Treatments: none  Oxygen Therapy:  is not on home oxygen therapy.   Ventilator:    - No ventilator support    Rehab Therapies: n/a  Weight Bearing Status/Restrictions: No weight bearing restirctions  Other Medical Equipment (for information only, NOT a DME order):  n/a  Other Treatments: Assist with home IV abx, PICC care and education    Patient's personal belongings (please select all that are sent with patient):  None    RN SIGNATURE: Electronically signed by Jose Gonzalez, RN on 3/4/21 at 12:57 PM EST    CASE MANAGEMENT/SOCIAL WORK SECTION    Inpatient Status Date: ***    Readmission Risk Assessment Score:  Readmission Risk              Risk of Unplanned Readmission:        16           Discharging to Facility/ Agency     [address]   [address]   [phone]   [facility]  [phone] fax    / signature: Electronically signed by Sejal Bonilla RN on 3/4/21 at 11:32 AM EST    PHYSICIAN SECTION    Prognosis: Good    Condition at Discharge: Stable    Rehab Potential (if transferring to Rehab): Good    Recommended Labs or Other Treatments After Discharge:   Recommended Follow-up, Labs or Other Treatments After Discharge:      ID INFUSION ORDERS:  Ertapenem 1g IV q24 continued through 3/18/21  Weekly CBC diff, CMP faxed to [phone]   Routine PICC care  Call with ID related concerns [phone]   Rey Benton MD              Physician Certification: I certify the above information and transfer of Jm Galloway  is necessary for the continuing treatment of the diagnosis listed and that he requires [address] for less 30 days.      Update Admission H&P: No change in H&P    PHYSICIAN SIGNATURE:  Electronically signed by Baljinder Gallo MD on 3/4/21 at 12:43 PM EST

## 2021-03-03 NOTE — PLAN OF CARE
Problem: Pain:  Goal: Pain level will decrease  Description: Pain level will decrease  Outcome: Ongoing  Note: Pt rates pain 5/10. Pt given PRN pain medication per MAR. Pt is aware of pain medication regimen. Pt instructed to call out when pain medication is needed. Pt given info related to non pharmacological pain management options. Pt verbalized understanding. Will continue to assess and reassess pain.

## 2021-03-03 NOTE — PROGRESS NOTES
Nephrology progress Note                                                                                                                                                                                                                                                                                                                                                               Office : [phone]     Fax :[phone]              Patient's Name: Maxim Hair  1:02 PM  3/3/2021    Reason for Consult:   CKD , need for IV access       Requesting Physician:  Allen Roque DO      Chief Complaint:  Abdominal pain     History of Present Ilness:    Maxim Hair is a 76 y.o. male  With PMH of HTN , CKD , SMV and PV thrombosis since 2/18 , on xarelto c/w c/o abdominal pain    2/15 colonoscopy : ischemic colitis     C/o nausea      Interval history    Abdominal pain improving  On antibiotics    BP better controlled , started on Metoprolol bid on 3/2    Started on TPN    Past Medical History:   Diagnosis Date    Hx of blood clots     Hyperlipidemia     Hypertension     PONV (postoperative nausea and vomiting)     Symptomatic cholelithiasis     Thrombosis        Past Surgical History:   Procedure Laterality Date    ANKLE SURGERY      left    CHOLECYSTECTOMY, LAPAROSCOPIC N/A 8/13/2020    LAPAROSCOPIC CHOLECYSTECTOMY WITH INTRAOPERATIVE CHOLANGIOGRAM performed by Elvia Jackson MD at [address] [place] [address]  03/08/2017    SHOULDER SURGERY Right 2012    TONSILLECTOMY      WRIST SURGERY      right       Family History   Problem Relation Age of Onset    Breast Cancer Mother     Coronary Art Dis Father     Diabetes Sister         reports that he has never smoked. He has never used smokeless tobacco. He reports current alcohol use. He reports that he does not use drugs. Allergies:  Patient has no known allergies.     Current Medications:        [START ON 3/4/2021] pantoprazole (PROTONIX) tablet 40 mg, QAM AC      PN-Adult Premix 5/20 - Standard Electrolytes - Central Line, Continuous TPN      PN-Adult Premix 5/20 - Standard Electrolytes - Central Line, Continuous TPN      fat emulsion 20 % infusion 250 mL, Once per day on Mon Thu      metoprolol tartrate (LOPRESSOR) tablet 25 mg, BID      insulin lispro (HUMALOG) injection vial 0-12 Units, TID WC      insulin lispro (HUMALOG) injection vial 0-6 Units, Nightly      glucose (GLUTOSE) 40 % oral gel 15 g, PRN      dextrose 50 % IV solution, PRN      glucagon (rDNA) injection 1 mg, PRN      dextrose 5 % solution, PRN      cloNIDine (CATAPRES) tablet 0.1 mg, BID      amLODIPine (NORVASC) tablet 10 mg, Daily      hydrALAZINE (APRESOLINE) tablet 50 mg, TID PRN      mesalamine (DELZICOL) delayed release capsule 800 mg, TID      melatonin disintegrating tablet 5 mg, Nightly PRN      piperacillin-tazobactam (ZOSYN) 4,500 mg in dextrose 5 % 100 mL IVPB (mini-bag), Q8H      morphine (PF) injection 2 mg, Q2H PRN    Or      morphine (PF) injection 4 mg, Q2H PRN      fenofibrate (TRICOR) tablet 54 mg, Daily      HYDROcodone-acetaminophen (NORCO) 5-325 MG per tablet 1 tablet, Q6H PRN      rivaroxaban (XARELTO) tablet 15 mg, Daily with breakfast      atorvastatin (LIPITOR) tablet 20 mg, Daily      sodium chloride flush 0.9 % injection 10 mL, 2 times per day      sodium chloride flush 0.9 % injection 10 mL, PRN      promethazine (PHENERGAN) tablet 12.5 mg, Q6H PRN    Or      ondansetron (ZOFRAN) injection 4 mg, Q6H PRN      acetaminophen (TYLENOL) tablet 650 mg, Q6H PRN    Or      acetaminophen (TYLENOL) suppository 650 mg, Q6H PRN      0.9 % sodium chloride infusion, Continuous        Review of Systems:   14 point ROS obtained but were negative except mentioned in HPI      Physical exam:     Vitals:  BP (!) 158/77   Pulse 61   Temp 98.6 °F (37 °C) (Oral)   Resp 16   Ht 5' 11\" (1.803 m)   Wt 180 lb 9.6 oz (81.9 kg)   SpO2 95%   BMI 25.19 kg/m² Constitutional:  OAA X3 NAD  Skin: no rash, turgor wnl  Heent:  eomi, mmm  Neck: no bruits or jvd noted  Cardiovascular:  S1, S2 without m/r/g  Respiratory: CTA B without w/r/r  Abdomen:  +bs, soft, nt, nd  Ext: no  lower extremity edema  Psychiatric: mood and affect appropriate  Musculoskeletal:  Rom, muscular strength intact    Data:   Labs:  CBC:   Recent Labs     03/01/21  0956   WBC 12.8*   HGB 13.0*        BMP:    Recent Labs     03/01/21  1133 03/02/21  1043 03/03/21  0656   * 132* 136   K 3.8 3.7 3.5   CL 98* 98* 101   CO2 26 27 27   BUN 13 16 15   CREATININE 0.8 1.0 0.9   GLUCOSE 140* 131* 156*     Ca/Mg/Phos:   Recent Labs     03/01/21  1133 03/02/21  1043 03/03/21  0656   CALCIUM 8.2* 8.4 8.1*   MG 1.90 1.80 1.90   PHOS 3.2 2.1* 2.7     Hepatic: No results for input(s): AST, ALT, ALB, BILITOT, ALKPHOS in the last 72 hours. Troponin: No results for input(s): TROPONINI in the last 72 hours. BNP: No results for input(s): BNP in the last 72 hours. Lipids:   Recent Labs     03/01/21  1133   TRIG 74     ABGs: No results for input(s): PHART, PO2ART, QPG6DCK in the last 72 hours. INR: No results for input(s): INR in the last 72 hours. UA:No results for input(s): [place], GLUCOSEU, BILIRUBINUR, Jose Bible, BLOODU, PHUR, PROTEINU, UROBILINOGEN, NITRU, LEUKOCYTESUR, LABMICR, URINETYPE in the last 72 hours. Urine Microscopic: No results for input(s): LABCAST, BACTERIA, COMU, HYALCAST, WBCUA, RBCUA, EPIU in the last 72 hours. Urine Culture: No results for input(s): LABURIN in the last 72 hours. Urine Chemistry: No results for input(s): [place], PROTEINUR, NAUR in the last 72 hours. IMAGING:  IR PICC WO SQ PORT/PUMP > 5 YEARS   Preliminary Result   Status post placement of right upper extremity PICC line with distal tip   located in region of junction of superior vena cava right atrium. XR ACUTE ABD SERIES CHEST 1 VW   Final Result   1.  Dilated loops of colon in a pattern more consistent with obstruction. No   free air. 2. Unremarkable chest radiograph. CTA ABDOMEN PELVIS W CONTRAST   Final Result   Colitis of the descending colon. The mesenteric and colic arteries are   patent, and there are no secondary findings of ischemia such as pneumatosis,   mesenteric/portal venous gas, or pneumoperitoneum             Assessment/Plan       1. CKD stage 2/3        Serum cr at baseline         GFR > 60           Ok to get PICC line on dominant arm if absolute requirement for IV access            moniter electrolytes while on TPN          2.  HTN : improving      Amlodipine 10 mg daily      Metoprolol 25 mg bid    Cont clonidine    Add hydralazine prn       Will follow up in Kidney clinic in 1 month        Thank you for allowing us to participate in care of [address] free to contact me   [facility] [address]  Office : [phone]  Fax :[phone]

## 2021-03-03 NOTE — PROGRESS NOTES
[facility] SURGERY    PATIENT NAME: Ericka Penn     TODAY'S DATE: 3/3/2021    CHIEF COMPLAINT: Abdominal pain    INTERVAL HISTORY/HPI:    Pt reports he is feeling better, no nausea and able to diamond diet - having BMs. REVIEW OF SYSTEMS:  Pertinent positives and negatives as per interval history section    OBJECTIVE:  VITALS:  BP (!) 158/77   Pulse 61   Temp 98.6 °F (37 °C) (Oral)   Resp 16   Ht 5' 11\" (1.803 m)   Wt 180 lb 9.6 oz (81.9 kg)   SpO2 95%   BMI 25.19 kg/m²     INTAKE/OUTPUT:    I/O last 3 completed shifts: In: 2199.5 [P.O.:600; I.V.:1403.5; IV Piggyback:100]  Out: -   No intake/output data recorded. CONSTITUTIONAL:  awake and alert  LUNGS:  Respirations easy and unlabored, clear to auscultation  CARD:  regular rate and rhythm  ABDOMEN:  normal bowel sounds, soft, non-distended, tenderness noted left mid abdomen improved    Data:  CBC:   Recent Labs     03/01/21  0956   WBC 12.8*   HGB 13.0*   HCT 39.9*        BMP:    Recent Labs     03/01/21  1133 03/02/21  1043 03/03/21  0656   * 132* 136   K 3.8 3.7 3.5   CL 98* 98* 101   CO2 26 27 27   BUN 13 16 15   CREATININE 0.8 1.0 0.9   GLUCOSE 140* 131* 156*     Hepatic: No results for input(s): AST, ALT, ALB, BILITOT, ALKPHOS in the last 72 hours. Mag:      Recent Labs     03/01/21  1133 03/02/21  1043 03/03/21  0656   MG 1.90 1.80 1.90      Phos:     Recent Labs     03/01/21  1133 03/02/21  1043 03/03/21  0656   PHOS 3.2 2.1* 2.7          ASSESSMENT AND PLAN:  69-year-old with descending colitis. Improving with medical management. Continue with current antibiotics. Diet advanced. Likely would benefit from repeat interval CT in next few weeks as outpatient to assess for improvement in the inflamed portion of colon.       dispo per GI and primary team.        Electronically signed by Beverly Kluver, APRN - CNP     Surgery Staff    I have examined this patient and read and agree with the note by Sabrina GEMINI Sharma from today. Seems to be steadily improving. Surgical intervention unlikely. Suspect will be able to wean TPN off soon and d/c home. Will follow peripherally. Please call w/ further questions.     MERT Plan A Drink THOMAS

## 2021-03-03 NOTE — CARE COORDINATION
Referral received to check IV Benefits. Patients benefit information is as follows: Therapy:Invanz 1g Q24    Pt.  Copay:$608/week for Ramin Fuller per devora to agency    Electronically signed by Felton Cunha on 3/3/2021 at 11:38 AM   Cell Ph# [phone], Office # [phone]

## 2021-03-03 NOTE — PLAN OF CARE
Problem: Pain:  Goal: Pain level will decrease  Description: Pain level will decrease  3/3/2021 0151 by Bella Thompson RN  Note: Pt c/o 5/10 pain. Pt given PRN pain medication per MAR. Pt is aware of pain medication regimen. Pt instructed to call out when pain medication is needed. Pt given info related to non pharmacological pain management options. Pt verbalized understanding. Will continue to assess and reassess pain.

## 2021-03-04 VITALS
HEART RATE: 58 BPM | RESPIRATION RATE: 16 BRPM | HEIGHT: 71 IN | DIASTOLIC BLOOD PRESSURE: 77 MMHG | WEIGHT: 180.6 LBS | BODY MASS INDEX: 25.28 KG/M2 | OXYGEN SATURATION: 97 % | SYSTOLIC BLOOD PRESSURE: 146 MMHG | TEMPERATURE: 98.1 F

## 2021-03-04 DIAGNOSIS — K52.9 COLITIS: Primary | ICD-10-CM

## 2021-03-04 LAB
ANION GAP SERPL CALCULATED.3IONS-SCNC: 6 MMOL/L (ref 3–16)
BUN BLDV-MCNC: 15 MG/DL (ref 7–20)
CALCIUM SERPL-MCNC: 8.2 MG/DL (ref 8.3–10.6)
CHLORIDE BLD-SCNC: 108 MMOL/L (ref 99–110)
CO2: 27 MMOL/L (ref 21–32)
CREAT SERPL-MCNC: 0.9 MG/DL (ref 0.8–1.3)
GFR AFRICAN AMERICAN: >60
GFR NON-AFRICAN AMERICAN: >60
GLUCOSE BLD-MCNC: 102 MG/DL (ref 70–99)
GLUCOSE BLD-MCNC: 113 MG/DL (ref 70–99)
GLUCOSE BLD-MCNC: 137 MG/DL (ref 70–99)
GLUCOSE BLD-MCNC: 155 MG/DL (ref 70–99)
GLUCOSE BLD-MCNC: 75 MG/DL (ref 70–99)
GLUCOSE BLD-MCNC: 89 MG/DL (ref 70–99)
MAGNESIUM: 2 MG/DL (ref 1.8–2.4)
PERFORMED ON: ABNORMAL
PERFORMED ON: NORMAL
PERFORMED ON: NORMAL
PHOSPHORUS: 3.3 MG/DL (ref 2.5–4.9)
POTASSIUM SERPL-SCNC: 3.8 MMOL/L (ref 3.5–5.1)
SODIUM BLD-SCNC: 141 MMOL/L (ref 136–145)

## 2021-03-04 PROCEDURE — 84100 ASSAY OF PHOSPHORUS: CPT

## 2021-03-04 PROCEDURE — 6370000000 HC RX 637 (ALT 250 FOR IP): Performed by: INTERNAL MEDICINE

## 2021-03-04 PROCEDURE — 2580000003 HC RX 258: Performed by: INTERNAL MEDICINE

## 2021-03-04 PROCEDURE — 80048 BASIC METABOLIC PNL TOTAL CA: CPT

## 2021-03-04 PROCEDURE — 36592 COLLECT BLOOD FROM PICC: CPT

## 2021-03-04 PROCEDURE — 83735 ASSAY OF MAGNESIUM: CPT

## 2021-03-04 PROCEDURE — 6370000000 HC RX 637 (ALT 250 FOR IP): Performed by: HOSPITALIST

## 2021-03-04 PROCEDURE — 6360000002 HC RX W HCPCS: Performed by: INTERNAL MEDICINE

## 2021-03-04 PROCEDURE — 99232 SBSQ HOSP IP/OBS MODERATE 35: CPT | Performed by: INTERNAL MEDICINE

## 2021-03-04 RX ORDER — MESALAMINE 400 MG/1
800 CAPSULE, DELAYED RELEASE ORAL 3 TIMES DAILY
Qty: 180 CAPSULE | Refills: 0 | Status: SHIPPED | OUTPATIENT
Start: 2021-03-04 | End: 2021-04-03

## 2021-03-04 RX ORDER — PANTOPRAZOLE SODIUM 40 MG/1
40 TABLET, DELAYED RELEASE ORAL
Qty: 30 TABLET | Refills: 0 | Status: SHIPPED | OUTPATIENT
Start: 2021-03-04 | End: 2021-04-12

## 2021-03-04 RX ORDER — AMLODIPINE BESYLATE 10 MG/1
10 TABLET ORAL DAILY
Qty: 30 TABLET | Refills: 0 | Status: SHIPPED | OUTPATIENT
Start: 2021-03-04 | End: 2021-07-27 | Stop reason: SDUPTHER

## 2021-03-04 RX ORDER — HYDROCODONE BITARTRATE AND ACETAMINOPHEN 5; 325 MG/1; MG/1
1 TABLET ORAL EVERY 6 HOURS PRN
Qty: 12 TABLET | Refills: 0 | Status: SHIPPED | OUTPATIENT
Start: 2021-03-04 | End: 2021-03-07

## 2021-03-04 RX ORDER — CLONIDINE HYDROCHLORIDE 0.1 MG/1
0.1 TABLET ORAL 2 TIMES DAILY
Qty: 60 TABLET | Refills: 0 | Status: SHIPPED | OUTPATIENT
Start: 2021-03-04 | End: 2021-04-12

## 2021-03-04 RX ADMIN — PANTOPRAZOLE SODIUM 40 MG: 40 TABLET, DELAYED RELEASE ORAL at 13:03

## 2021-03-04 RX ADMIN — ERTAPENEM SODIUM 1000 MG: 1 INJECTION, POWDER, LYOPHILIZED, FOR SOLUTION INTRAMUSCULAR; INTRAVENOUS at 13:03

## 2021-03-04 RX ADMIN — SODIUM CHLORIDE, PRESERVATIVE FREE 10 ML: 5 INJECTION INTRAVENOUS at 10:25

## 2021-03-04 RX ADMIN — FENOFIBRATE 54 MG: 54 TABLET ORAL at 10:22

## 2021-03-04 RX ADMIN — AMLODIPINE BESYLATE 10 MG: 5 TABLET ORAL at 10:22

## 2021-03-04 RX ADMIN — MESALAMINE 800 MG: 400 CAPSULE, DELAYED RELEASE ORAL at 10:22

## 2021-03-04 RX ADMIN — RIVAROXABAN 15 MG: 15 TABLET, FILM COATED ORAL at 10:22

## 2021-03-04 RX ADMIN — MESALAMINE 800 MG: 400 CAPSULE, DELAYED RELEASE ORAL at 14:20

## 2021-03-04 RX ADMIN — CLONIDINE HYDROCHLORIDE 0.1 MG: 0.1 TABLET ORAL at 10:21

## 2021-03-04 RX ADMIN — METOPROLOL TARTRATE 25 MG: 25 TABLET, FILM COATED ORAL at 10:22

## 2021-03-04 RX ADMIN — PIPERACILLIN SODIUM AND TAZOBACTAM SODIUM 4500 MG: 4; .5 INJECTION, POWDER, LYOPHILIZED, FOR SOLUTION INTRAVENOUS at 05:14

## 2021-03-04 RX ADMIN — SODIUM CHLORIDE: 9 INJECTION, SOLUTION INTRAVENOUS at 05:08

## 2021-03-04 RX ADMIN — ATORVASTATIN CALCIUM 20 MG: 10 TABLET, FILM COATED ORAL at 10:22

## 2021-03-04 NOTE — PROGRESS NOTES
Comprehensive Nutrition Assessment    Type and Reason for Visit:  Reassess    Nutrition Recommendations/Plan:   1. To taper off TPN, cut rate in half and let bag empty out. 2. Continue on low fat diet with Ensure Enlive TID. 3. Monitor PO intakes nutrition adequacy, pertinent labs, bowel habits, wt changes, and clinical progress. Nutrition Assessment:  Follow up: pt tapering off TPN. Appetite and PO intake significantly improving. Pt reports no N/V this date and his constipation is getting more regular. Reports a BM this morning, which pt says was normal. Tolerating low fat diet well. Says he is ready to not be on TPN anymore, has been trying to eat more of his tray each day. Encouraged continued PO intake with pt. Pt had no othe nutritional concerns or questions at this time. Continue to monitor intakes. Malnutrition Assessment:  Malnutrition Status: At risk for malnutrition (Comment)        Estimated Daily Nutrient Needs:  Energy (kcal):  0857-0531; Weight Used for Energy Requirements:  Ideal     Protein (g):  102-117; Weight Used for Protein Requirements:  Ideal(1.3-1.5)        Fluid (ml/day):   ; Method Used for Fluid Requirements:  1 ml/kcal      Nutrition Related Findings:  +BM 3/4 per pt. BG . +20L fluid per I&Os. Wounds:  None       Current Nutrition Therapies:    Dietary Nutrition Supplements: Standard High Calorie Oral Supplement  DIET LOW FAT;  PN-Adult Premix 5/20 - Standard Electrolytes - Central Line    Anthropometric Measures:  · Height: 5' 11\" (180.3 cm)  · Current Body Weight: 180 lb 9 oz (81.9 kg)   · Admission Body Weight: 180 lb 9 oz (81.9 kg)     · Ideal Body Weight: 172 lbs; % Ideal Body Weight     · BMI: 25.2   · BMI Categories: Overweight (BMI 25.0-29. 9)       Nutrition Diagnosis:   · Inadequate oral intake related to altered GI function as evidenced by intake 0-25%, intake 26-50%, nutrition support - parenteral nutrition      Nutrition Interventions:   Food and/or

## 2021-03-04 NOTE — PROGRESS NOTES
Once      pantoprazole (PROTONIX) tablet 40 mg, QAM AC      PN-Adult Premix 5/20 - Standard Electrolytes - Central Line, Continuous TPN      fat emulsion 20 % infusion 250 mL, Once per day on Mon Thu      metoprolol tartrate (LOPRESSOR) tablet 25 mg, BID      insulin lispro (HUMALOG) injection vial 0-12 Units, TID WC      insulin lispro (HUMALOG) injection vial 0-6 Units, Nightly      glucose (GLUTOSE) 40 % oral gel 15 g, PRN      dextrose 50 % IV solution, PRN      glucagon (rDNA) injection 1 mg, PRN      dextrose 5 % solution, PRN      cloNIDine (CATAPRES) tablet 0.1 mg, BID      amLODIPine (NORVASC) tablet 10 mg, Daily      hydrALAZINE (APRESOLINE) tablet 50 mg, TID PRN      mesalamine (DELZICOL) delayed release capsule 800 mg, TID      melatonin disintegrating tablet 5 mg, Nightly PRN      morphine (PF) injection 2 mg, Q2H PRN    Or      morphine (PF) injection 4 mg, Q2H PRN      fenofibrate (TRICOR) tablet 54 mg, Daily      HYDROcodone-acetaminophen (NORCO) 5-325 MG per tablet 1 tablet, Q6H PRN      rivaroxaban (XARELTO) tablet 15 mg, Daily with breakfast      atorvastatin (LIPITOR) tablet 20 mg, Daily      sodium chloride flush 0.9 % injection 10 mL, 2 times per day      sodium chloride flush 0.9 % injection 10 mL, PRN      promethazine (PHENERGAN) tablet 12.5 mg, Q6H PRN    Or      ondansetron (ZOFRAN) injection 4 mg, Q6H PRN      acetaminophen (TYLENOL) tablet 650 mg, Q6H PRN    Or      acetaminophen (TYLENOL) suppository 650 mg, Q6H PRN      0.9 % sodium chloride infusion, Continuous        Review of Systems:   14 point ROS obtained but were negative except mentioned in HPI      Physical exam:     Vitals:  BP (!) 151/75   Pulse 60   Temp 98.5 °F (36.9 °C) (Oral)   Resp 16   Ht 5' 11\" (1.803 m)   Wt 180 lb 9.6 oz (81.9 kg)   SpO2 97%   BMI 25.19 kg/m²   Constitutional:  OAA X3 NAD  Skin: no rash, turgor wnl  Heent:  eomi, mmm  Neck: no bruits or jvd noted  Cardiovascular: S1, S2 without m/r/g  Respiratory: CTA B without w/r/r  Abdomen:  +bs, soft, nt, nd  Ext: no  lower extremity edema  Psychiatric: mood and affect appropriate  Musculoskeletal:  Rom, muscular strength intact    Data:   Labs:  CBC:   No results for input(s): WBC, HGB, PLT in the last 72 hours. BMP:    Recent Labs     03/02/21  1043 03/03/21  0656 03/04/21  0515   * 136 141   K 3.7 3.5 3.8   CL 98* 101 108   CO2 27 27 27   BUN 16 15 15   CREATININE 1.0 0.9 0.9   GLUCOSE 131* 156* 102*     Ca/Mg/Phos:   Recent Labs     03/02/21  1043 03/03/21  0656 03/04/21  0515   CALCIUM 8.4 8.1* 8.2*   MG 1.80 1.90 2.00   PHOS 2.1* 2.7 3.3     Hepatic: No results for input(s): AST, ALT, ALB, BILITOT, ALKPHOS in the last 72 hours. Troponin: No results for input(s): TROPONINI in the last 72 hours. BNP: No results for input(s): BNP in the last 72 hours. Lipids:   No results for input(s): CHOL, TRIG, HDL, LDLCALC, LABVLDL in the last 72 hours. ABGs: No results for input(s): PHART, PO2ART, TBD8FIE in the last 72 hours. INR: No results for input(s): INR in the last 72 hours. UA:No results for input(s): Joshua Boys, GLUCOSEU, BILIRUBINUR, Tildon Heading, BLOODU, PHUR, PROTEINU, UROBILINOGEN, NITRU, LEUKOCYTESUR, LABMICR, URINETYPE in the last 72 hours. Urine Microscopic: No results for input(s): LABCAST, BACTERIA, COMU, HYALCAST, WBCUA, RBCUA, EPIU in the last 72 hours. Urine Culture: No results for input(s): LABURIN in the last 72 hours. Urine Chemistry: No results for input(s): Pratt Harm, PROTEINUR, NAUR in the last 72 hours. IMAGING:  IR PICC WO SQ PORT/PUMP > 5 YEARS   Final Result   Status post placement of right upper extremity PICC line with distal tip   located in region of junction of superior vena cava right atrium. XR ACUTE ABD SERIES CHEST 1 VW   Final Result   1. Dilated loops of colon in a pattern more consistent with obstruction. No   free air. 2. Unremarkable chest radiograph. CTA ABDOMEN PELVIS W CONTRAST   Final Result   Colitis of the descending colon. The mesenteric and colic arteries are   patent, and there are no secondary findings of ischemia such as pneumatosis,   mesenteric/portal venous gas, or pneumoperitoneum             Assessment/Plan       1. CKD stage 2/3        Serum cr at baseline         GFR > 60         Renal fn stable           Ok to get PICC line on dominant arm if absolute requirement for IV access            moniter electrolytes while on TPN          2.  HTN : improving       Amlodipine 10 mg daily      Metoprolol 25 mg bid    Cont clonidine    Add hydralazine prn       Will follow up in [facility] in 1 month        Thank you for allowing us to participate in care of Yariel Miles free to contact me   [facility] [address]  Office : [phone]  Fax :[phone]

## 2021-03-04 NOTE — PROGRESS NOTES
History of refractory colitis discharging today on IV ertapenem     Needs repeat CT abd week of 3/15/21, order placed    Thanks

## 2021-03-04 NOTE — PROGRESS NOTES
Pt is alert and oriented. VSS. RA. Pt denies pain and discomfort at this time. Shift assessment completed and documented. Call light within reach. Bed side table within reach. Wheels locked. Bed in lowest position. Pt instructed to call out for assistance. Pt expressesed understanding & calls out appropritately. All care per orders. Will continue to monitor.  Electronically signed by Josephine Small RN on 3/4/2021 at 12:21 AM

## 2021-03-04 NOTE — DISCHARGE SUMMARY
Hospital Medicine Discharge Summary    Patient ID: Samantha Vera      Patient's PCP: Jasiel Chris DO    Admit Date: 2/24/2021     Discharge Date: 3/4/2021      Admitting Physician: Khalif Davis MD     Discharge Physician: Jerry Ko MD     Discharge Diagnoses: Active Hospital Problems    Diagnosis    Colitis [K52.9]       The patient was seen and examined on day of discharge and this discharge summary is in conjunction with any daily progress note from day of discharge. Hospital Course:   76 y.o. male who presented to [facility] with above complaint. He has been having abdominal pain for last 1 month. He has been seeing GI and had EGD and colonoscopy in the recent past.  His symptom does not go away and apparently the symptoms worsen. Thus why he came to the emergency room. He does have some nausea no diarrhea. He told he did not move his bowels for last 7 to 10 days. No abdominal distention fever change in mental status chest pain cough sputum or any focal neurological symptoms. Was seen in the emergency room yesterday and was discharged on Cipro and Flagyl. Descending colitis, likely ischemic  - recent C-scope with biopsy showed ischemic colitis  - no improvement with 2 courses of antibiotics outpatient including Cipro and Flagyl  - ID consulted  - GI consulted  - started on zosyn. discharged on IV ertapenem to complete course  - continue with mesalamine  - Morphine/percocet prn  - general surgery consulted  - no plan for surgery at this time  - diet advanced.  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   2. Unremarkable chest radiograph. CTA ABDOMEN PELVIS W CONTRAST   Final Result   Colitis of the descending colon. The mesenteric and colic arteries are   patent, and there are no secondary findings of ischemia such as pneumatosis,   mesenteric/portal venous gas, or pneumoperitoneum                Consults:     IP CONSULT TO GI  IP CONSULT TO GI  IP CONSULT TO HOSPITALIST  IP CONSULT TO GI  IP CONSULT TO GENERAL SURGERY  IP CONSULT TO INFECTIOUS DISEASES  IP CONSULT TO SPIRITUAL SERVICES  IP CONSULT TO NEPHROLOGY  IP CONSULT TO DIETITIAN  IP CONSULT TO HOME CARE NEEDS    Disposition:  Sandy Lopez     Condition at Discharge: Stable    Discharge Instructions/Follow-up:  Follow up with PCP, GI, ID within 1-2 weeks     Code Status:  Full Code     Activity: activity as tolerated    Diet: regular diet      Discharge Medications:     Discharge Medication List as of 3/4/2021  3:23 PM           Details   cloNIDine (CATAPRES) 0.1 MG tablet Take 1 tablet by mouth 2 times daily, Disp-60 tablet, R-0Normal      metoprolol tartrate (LOPRESSOR) 25 MG tablet Take 1 tablet by mouth 2 times daily, Disp-60 tablet, R-0Normal      mesalamine (DELZICOL) 400 MG CPDR delayed release capsule Take 2 capsules by mouth 3 times daily, Disp-180 capsule, R-0Normal      pantoprazole (PROTONIX) 40 MG tablet Take 1 tablet by mouth every morning (before breakfast), Disp-30 tablet, R-0Normal              Details   HYDROcodone-acetaminophen (NORCO) 5-325 MG per tablet Take 1 tablet by mouth every 6 hours as needed for Pain for up to 3 days. , Disp-12 tablet, R-0Normal      amLODIPine (NORVASC) 10 MG tablet Take 1 tablet by mouth daily, Disp-30 tablet, R-0Normal              Details   ondansetron (ZOFRAN ODT) 4 MG disintegrating tablet Take 1 tablet by mouth every 8 hours as needed for Nausea, Disp-20 tablet, R-0Print      simvastatin (ZOCOR) 20 MG tablet Take 1 tablet by mouth nightly, Disp-90 tablet, R-3Normal      fenofibrate (TRICOR) 145 MG tablet Take 1 tablet by mouth daily TAKE 1 TABLET BY MOUTH ONCE DAILY, Disp-90 tablet, R-3Please consider 90 day supplies to promote better adherenceNormal      rivaroxaban (XARELTO) 20 MG TABS tablet Take 10 mg by mouth daily (with breakfast) Historical Med             Time Spent on discharge is more than 30 minutes in the examination, evaluation, counseling and review of medications and discharge plan. Signed:    Saurav Ram MD   3/4/2021      Thank you Beth BERNAL DO for the opportunity to be involved in this patient's care. If you have any questions or concerns please feel free to contact me at [phone].

## 2021-03-04 NOTE — CARE COORDINATION
Jeffry aware of discharge order. Talked to patient about cost and what to expect when she gets home. Patient was ok with cost and understands that [facility] will call to arrange payment options if needed. Sent all paperwork to [facility]. Sent the [address] to [facility] and tlaked to [place] to arrange delivery.  Thanks for the referral.    Electronically signed by Tomas Turner on 3/4/2021 at 1:22 PM   Cell Ph# [phone], Office # [phone]

## 2021-03-04 NOTE — PROGRESS NOTES
Pt discharged per order. Given written and verbal discharge instructions, all questions answered. Left with PICC in place for home IV abx. Left floor in stable to home with [facility], left with all belongings.

## 2021-03-04 NOTE — PROGRESS NOTES
Value Date    ALKPHOS 61 02/26/2021    ALT 14 02/26/2021    AST 14 02/26/2021    PROT 6.2 02/26/2021    BILITOT 0.6 02/26/2021    BILIDIR <0.2 02/20/2020    IBILI see below 02/20/2020    LABALBU 3.3 02/26/2021       Radiology Review:  All pertinent images / reports were reviewed as a part of this visit. CTA a/p 2/24/21   Impression   Colitis of the descending colon.  The mesenteric and colic arteries are   patent, and there are no secondary findings of ischemia such as pneumatosis,   mesenteric/portal venous gas, or pneumoperitoneum      CT a/p 2/21/21  Impression   Slightly increased severity of colitis involving the descending colon which   is most likely infectious or inflammatory, less likely ischemic.  No evidence   of complication of the colitis.      CT a/p 2/6/21  Impression   Findings compatible with nonspecific infectious/inflammatory colitis   involving distal splenic flexure through distal descending colon.  No   evidence for bowel obstruction, perforation or abscess formation.        Assessment:     Patient Active Problem List    Diagnosis Date Noted    Colitis 02/24/2021    Murmur 09/16/2020    Symptomatic cholelithiasis 08/11/2020    Stage 3 chronic kidney disease 03/09/2020    Chronic abdominal pain 04/30/2018    Essential hypertension 02/26/2018    Mixed hyperlipidemia 02/26/2018    Mesenteric vein thrombosis ([facility] [address] 02/20/2018       Focal descending colitis, acute onset ~5 weeks ago  Path from outpatient colonoscopy c/w ischemic colitis per Dr. Bandar Escalona. CTA this admission with patent mesenteric and colic arteries.     Did not seem to improve with cipro/flagyl/budesonide    -clinically improved  -reasonable to continue with IV abx, will change to ertapenem for ease of dosing  -plan 2 weeks with repeat CT at that point  -ok for DC      Discussed with patient/family, all questions answered  D/w RN, Lulu Gama MD  Phone: [phone]   Fax : [phone]

## 2021-03-04 NOTE — PROGRESS NOTES
Majo Dempsey is a 76 y.o. male patient.     Current Facility-Administered Medications   Medication Dose Route Frequency Provider Last Rate Last Admin    pantoprazole (PROTONIX) tablet 40 mg  40 mg Oral QAM AC Mary Damon MD        PN-Adult Premix 5/20 - Standard Electrolytes - Central Line   Intravenous Continuous TPN Mary Damon MD 65 mL/hr at 03/03/21 1803 New Bag at 03/03/21 1803    fat emulsion 20 % infusion 250 mL  250 mL Intravenous Once per day on Mon Thu Mary Dmaon MD   Stopped at 03/03/21 0736    metoprolol tartrate (LOPRESSOR) tablet 25 mg  25 mg Oral BID Ju Orlando MD   25 mg at 03/04/21 1022    insulin lispro (HUMALOG) injection vial 0-12 Units  0-12 Units Subcutaneous TID WC Gill Eng, APRN - CNP        insulin lispro (HUMALOG) injection vial 0-6 Units  0-6 Units Subcutaneous Nightly Gill Eng, APRN - CNP   1 Units at 03/02/21 2036    glucose (GLUTOSE) 40 % oral gel 15 g  15 g Oral PRN Gill Eng, APRN - CNP        dextrose 50 % IV solution  12.5 g Intravenous PRN Gill Eng, APRN - CNP        glucagon (rDNA) injection 1 mg  1 mg Intramuscular PRN Gill Eng, APRN - CNP        dextrose 5 % solution  100 mL/hr Intravenous PRN Gill Eng, APRN - CNP        cloNIDine (CATAPRES) tablet 0.1 mg  0.1 mg Oral BID Mary Damon MD   0.1 mg at 03/04/21 1021    amLODIPine (NORVASC) tablet 10 mg  10 mg Oral Daily Ju Orlando MD   10 mg at 03/04/21 1022    hydrALAZINE (APRESOLINE) tablet 50 mg  50 mg Oral TID PRN Ju Orlando MD        mesalamine (DELZICOL) delayed release capsule 800 mg  800 mg Oral TID Charity Dunlap MD   800 mg at 03/04/21 1022    melatonin disintegrating tablet 5 mg  5 mg Oral Nightly PRN Gill Eng, APRN - CNP   5 mg at 02/27/21 2252    piperacillin-tazobactam (ZOSYN) 4,500 mg in dextrose 5 % 100 mL IVPB (mini-bag)  4,500 mg Intravenous Areta Heritage, MD   Stopped at 03/04/21 0554    morphine (PF) injection 2 mg  2 mg Intravenous Q2H PRN Holly Isbell, MD   2 mg at 03/02/21 1438    Or    morphine (PF) injection 4 mg  4 mg Intravenous Q2H PRN Holly Isbell, MD   4 mg at 03/02/21 2218    fenofibrate (TRICOR) tablet 54 mg  54 mg Oral Daily Gem Necessary, MD   54 mg at 03/04/21 1022    HYDROcodone-acetaminophen (NORCO) 5-325 MG per tablet 1 tablet  1 tablet Oral Q6H PRN Gem Necessary, MD   1 tablet at 03/03/21 2222    rivaroxaban (XARELTO) tablet 15 mg  15 mg Oral Daily with breakfast Gem Necessary, MD   15 mg at 03/04/21 1022    atorvastatin (LIPITOR) tablet 20 mg  20 mg Oral Daily Gem Necessary, MD   20 mg at 03/04/21 1022    sodium chloride flush 0.9 % injection 10 mL  10 mL Intravenous 2 times per day Gem Necessary, MD   10 mL at 03/04/21 1025    sodium chloride flush 0.9 % injection 10 mL  10 mL Intravenous PRN Gem Necessary, MD        promethazine (PHENERGAN) tablet 12.5 mg  12.5 mg Oral Q6H PRN Gem Necessary, MD   12.5 mg at 03/01/21 0919    Or    ondansetron (ZOFRAN) injection 4 mg  4 mg Intravenous Q6H PRN Gem Necessary, MD   4 mg at 03/01/21 1711    acetaminophen (TYLENOL) tablet 650 mg  650 mg Oral Q6H PRN Gem Necessary, MD   650 mg at 02/28/21 1512    Or    acetaminophen (TYLENOL) suppository 650 mg  650 mg Rectal Q6H PRN Gem Necessary, MD        0.9 % sodium chloride infusion   Intravenous Continuous Gem Necessary, MD 75 mL/hr at 03/04/21 0508 New Bag at 03/04/21 0508     No Known Allergies  Active Problems:    Colitis  Resolved Problems:    * No resolved hospital problems. *    Blood pressure (!) 151/75, pulse 60, temperature 98.5 °F (36.9 °C), temperature source Oral, resp. rate 16, height 5' 11\" (1.803 m), weight 180 lb 9.6 oz (81.9 kg), SpO2 97 %. Subjective:  Symptoms:  Improved. Pain:  He complains of pain that is mild. Objective:  General Appearance: In no acute distress and not in pain.     Vital signs: (most recent): Blood pressure (!) 151/75, pulse 60, temperature 98.5 °F (36.9 °C), temperature source Oral, resp. rate 16, height 5' 11\" (1.803 m), weight 180 lb 9.6 oz (81.9 kg), SpO2 97 %. Abdomen: Abdomen is soft. Bowel sounds are normal.   There is no abdominal tenderness. Assessment & Plan  77 yo w HTN, CKD and SMV and PV thrombosis since 2/2018, on Xarelto, admitted w h/o persistent abd pain and ischemic colitis in prox descending colon and splenic flexure confirmed by colonoscopy/Bx on 2/15/21, refractory to 2 courses of outpatient Abx's. Outpatient stool PCR was neg. Pain and tenderness improved, he has been able to tolerate solid food and is having bm's.     Plan:   1. Supportive care  2. Outpatient IV Abx's per ID recommendation  3. Stopped Dulcolax and Miralax  4. Continue PO Mesalamine   5. Continue low fat  6.  Will follow in office     Dilma Quezada MD       [phone]) [phone]    Dilma Quezada MD  3/4/2021

## 2021-03-04 NOTE — PROGRESS NOTES
Pt alert and oriented, VSS. Shift assessment completed and documented. Pt still with some tenderness, but says is tolerable. Was able to eat approximately 75% of breakfast. Denies any needs at this time. Will continue to monitor.

## 2021-03-05 ENCOUNTER — CARE COORDINATION (OUTPATIENT)
Dept: CASE MANAGEMENT | Age: 66
End: 2021-03-05

## 2021-03-05 DIAGNOSIS — K52.9 COLITIS: ICD-10-CM

## 2021-03-05 DIAGNOSIS — G89.29 CHRONIC ABDOMINAL PAIN: Primary | Chronic | ICD-10-CM

## 2021-03-05 DIAGNOSIS — R10.9 CHRONIC ABDOMINAL PAIN: Primary | Chronic | ICD-10-CM

## 2021-03-05 PROCEDURE — 1111F DSCHRG MED/CURRENT MED MERGE: CPT | Performed by: FAMILY MEDICINE

## 2021-03-05 NOTE — CARE COORDINATION
[facility] Transitions Initial Follow Up Call    Call within 2 business days of discharge: Yes    Patient: Nargis Villareal Patient : 1955   MRN: [id number]  Reason for Admission: Colitis   Discharge Date: 3/4/21 RARS: Readmission Risk Score: 15      Last Discharge [id number] [id number]       Complaint Diagnosis Description Type Department Provider    21 Abdominal Pain Colitis . .. ED to Hosp-Admission (Discharged) (ADMITTED) Wil Lainez MD; Rui Mascorro. Spoke with: [facility] [facility] N[facility]: [facility]     Spoke with patient who reported he is doing well. Patient reported he is just tired and is resting. Patient reported abd pain is well tolerated and he has had a bowel movement since being home. CTN reviewed all medications with patient. Patient reported [facility] will be out this afternoon and [facility] has already delivered antibiotics. Patient reported he has apt with Dr. Lulú Boone on 3/10. Patient reported he is eating and drinking well and denied any further needs at this time. CTN advised patient of use of urgent care or physicians 24 hr access line if assistance is needed after hours. Also advised that they can always contact their home care provider to request a nurse visit even if it isn't their regularly scheduled day for their nurse to visit. Challenges to be reviewed by the provider   Additional needs identified to be addressed with provider No  none             Method of communication with provider : none    Discussed COVID-19 related testing which was not done at this time. Test results were not done. Patient informed of results, if available? Yes    Advance Care Planning:   Does patient have an Advance Directive:  not on file. Was this a readmission?  No  Patient stated reason for admission: colitis   Patients top risk factors for readmission: medical condition    Care Transition Nurse (CTN) contacted the patient by telephone to perform post hospital discharge assessment. Verified name and  with patient as identifiers. Provided introduction to self, and explanation of the CTN role. CTN reviewed discharge instructions, medical action plan and red flags with patient who verbalized understanding. Patient given an opportunity to ask questions and does not have any further questions or concerns at this time. Were discharge instructions available to patient? Yes. Reviewed appropriate site of care based on symptoms and resources available to patient including: PCP, Specialist and When to call 911. The patient agrees to contact the PCP office for questions related to their healthcare. Medication reconciliation was performed with patient, who verbalizes understanding of administration of home medications. Advised obtaining a 90-day supply of all daily and as-needed medications. Covid Risk Education    Patient has following risk factors of: chronic kidney disease. Education provided regarding infection prevention, and signs and symptoms of COVID-19 and when to seek medical attention with patient who verbalized understanding. Discussed exposure protocols and quarantine From CDC: Are you at higher risk for severe illness?   and given an opportunity for questions and concerns. The patient agrees to contact the COVID-19 hotline [phone] or PCP office for questions related to COVID-19. For more information on steps you can take to protect yourself, see CDC's How to Protect Yourself     Was patient discharged with a pulse oximeter? No Discussed and confirmed pulse oximeter discharge instructions and when to notify provider or seek emergency care. Discussed follow-up appointments. If no appointment was previously scheduled, appointment scheduling offered: Yes. Is follow up appointment scheduled within 7 days of discharge? Yes  Plan for follow-up call in 5-7 days based on severity of symptoms and risk factors.     CTN provided contact information for future needs.           Care Transitions 24 Hour Call    Do you have any ongoing symptoms?: No  Do you have a copy of your discharge instructions?: Yes  Do you have all of your prescriptions and are they filled?: Yes  Have you been contacted by a [address]?: No  Have you scheduled your follow up appointment?: Yes  How are you going to get to your appointment?: Car - family or friend to transport  Were you discharged with any Home Care or Post Acute Services: Yes  Post Acute Services: Home Health (Comment: [address] )  Do you feel like you have everything you need to keep you well at home?: Yes  Care Transitions Interventions         Follow Up  Future Appointments   Date Time Provider Karely Ashley   3/15/2021  1:20 PM Nancy Gruber MD [facility] Neph[facility]o   3/15/2021  2:00 PM TONIAA CT VCT TONIAAZ CT Juancarlos Rash   7/27/2021 11:00 AM MD Bisi Farooq [facility]       Sanjay Omalley RN

## 2021-03-09 ENCOUNTER — HOSPITAL ENCOUNTER (OUTPATIENT)
Age: 66
Setting detail: SPECIMEN
Discharge: HOME | End: 2021-03-09
Payer: MEDICARE

## 2021-03-09 LAB
A/G RATIO: 1.5 (ref 1.1–2.2)
ALBUMIN SERPL-MCNC: 4 G/DL (ref 3.4–5)
ALP BLD-CCNC: 58 U/L (ref 40–129)
ALT SERPL-CCNC: 50 U/L (ref 10–40)
ANION GAP SERPL CALCULATED.3IONS-SCNC: 12 MMOL/L (ref 3–16)
AST SERPL-CCNC: 29 U/L (ref 15–37)
BASOPHILS ABSOLUTE: 0.1 K/UL (ref 0–0.2)
BASOPHILS RELATIVE PERCENT: 1.2 %
BILIRUB SERPL-MCNC: 0.4 MG/DL (ref 0–1)
BUN BLDV-MCNC: 27 MG/DL (ref 7–20)
CALCIUM SERPL-MCNC: 9 MG/DL (ref 8.3–10.6)
CHLORIDE BLD-SCNC: 101 MMOL/L (ref 99–110)
CO2: 25 MMOL/L (ref 21–32)
CREAT SERPL-MCNC: 1.3 MG/DL (ref 0.8–1.3)
EOSINOPHILS ABSOLUTE: 0.2 K/UL (ref 0–0.6)
EOSINOPHILS RELATIVE PERCENT: 3.2 %
GFR AFRICAN AMERICAN: >60
GFR NON-AFRICAN AMERICAN: 55
GLOBULIN: 2.6 G/DL
GLUCOSE BLD-MCNC: 99 MG/DL (ref 70–99)
HCT VFR BLD CALC: 36.4 % (ref 40.5–52.5)
HEMOGLOBIN: 12 G/DL (ref 13.5–17.5)
LYMPHOCYTES ABSOLUTE: 1.3 K/UL (ref 1–5.1)
LYMPHOCYTES RELATIVE PERCENT: 19.2 %
MCH RBC QN AUTO: 27 PG (ref 26–34)
MCHC RBC AUTO-ENTMCNC: 32.9 G/DL (ref 31–36)
MCV RBC AUTO: 82.1 FL (ref 80–100)
MONOCYTES ABSOLUTE: 0.6 K/UL (ref 0–1.3)
MONOCYTES RELATIVE PERCENT: 9.4 %
NEUTROPHILS ABSOLUTE: 4.5 K/UL (ref 1.7–7.7)
NEUTROPHILS RELATIVE PERCENT: 67 %
PDW BLD-RTO: 14.4 % (ref 12.4–15.4)
PLATELET # BLD: 325 K/UL (ref 135–450)
PMV BLD AUTO: 7.8 FL (ref 5–10.5)
POTASSIUM SERPL-SCNC: 4.4 MMOL/L (ref 3.5–5.1)
RBC # BLD: 4.43 M/UL (ref 4.2–5.9)
SODIUM BLD-SCNC: 138 MMOL/L (ref 136–145)
TOTAL PROTEIN: 6.6 G/DL (ref 6.4–8.2)
WBC # BLD: 6.7 K/UL (ref 4–11)

## 2021-03-09 PROCEDURE — 80053 COMPREHEN METABOLIC PANEL: CPT

## 2021-03-09 PROCEDURE — 36415 COLL VENOUS BLD VENIPUNCTURE: CPT

## 2021-03-09 PROCEDURE — 85025 COMPLETE CBC W/AUTO DIFF WBC: CPT

## 2021-03-12 ENCOUNTER — CARE COORDINATION (OUTPATIENT)
Dept: CASE MANAGEMENT | Age: 66
End: 2021-03-12

## 2021-03-12 NOTE — CARE COORDINATION
[facility] Transitions Follow Up Call    3/12/2021    Patient: Yady Sage  Patient : 1955   MRN: [id number]  Reason for Admission: colitis   Discharge Date: 3/4/21 RARS: Readmission Risk Score: 15         Spoke with: Gareth Myers with patient who reported he is doing ok. Patient reported abd pain has improved just some slight pain at times. Patient will continue the IV abx at this time and has CT on 3/15 as well as apt with nephrologist. Patient reported he is just resting and increasing food and fluids to regain strength. Patient denied any further needs at this time. CTN advised patient of use of urgent care or physicians 24 hr access line if assistance is needed after hours. Also advised that they can always contact their home care provider to request a nurse visit even if it isn't their regularly scheduled day for their nurse to visit. Care Transitions Subsequent and Final Call    Subsequent and Final Calls  Do you have any ongoing symptoms?: No  Have your medications changed?: No  Do you have any questions related to your medications?: No  Do you currently have any active services?: Yes  Are you currently active with any services?: Home Health  Do you have any needs or concerns that I can assist you with?: No  Identified Barriers: None  Care Transitions Interventions  Other Interventions:            Follow Up  Future Appointments   Date Time Provider Karely Ashley   3/15/2021  1:20 PM Nancy Gruber MD [facility] Nephrolo   3/15/2021  2:00 PM [facility] CT VCT [facility]HAZ CT Juancarlos Rash   5/10/2021  1:00 PM Nancy Gruber MD [facility] Nephrolo   2021 11:00 AM Alfonso Roe MD [facility]       Sanjay Omalley RN

## 2021-03-13 ENCOUNTER — HOSPITAL ENCOUNTER (EMERGENCY)
Age: 66
Discharge: HOME | End: 2021-03-13
Attending: EMERGENCY MEDICINE
Payer: MEDICARE

## 2021-03-13 VITALS
OXYGEN SATURATION: 97 % | DIASTOLIC BLOOD PRESSURE: 82 MMHG | HEART RATE: 48 BPM | WEIGHT: 170 LBS | TEMPERATURE: 98 F | BODY MASS INDEX: 23.71 KG/M2 | SYSTOLIC BLOOD PRESSURE: 135 MMHG | RESPIRATION RATE: 18 BRPM

## 2021-03-13 DIAGNOSIS — J02.9 ACUTE PHARYNGITIS, UNSPECIFIED ETIOLOGY: Primary | ICD-10-CM

## 2021-03-13 DIAGNOSIS — B37.0 CANDIDIASIS OF MOUTH: ICD-10-CM

## 2021-03-13 LAB — S PYO AG THROAT QL: NEGATIVE

## 2021-03-13 PROCEDURE — 6370000000 HC RX 637 (ALT 250 FOR IP): Performed by: EMERGENCY MEDICINE

## 2021-03-13 PROCEDURE — 87880 STREP A ASSAY W/OPTIC: CPT

## 2021-03-13 PROCEDURE — 87205 SMEAR GRAM STAIN: CPT

## 2021-03-13 PROCEDURE — 87106 FUNGI IDENTIFICATION YEAST: CPT

## 2021-03-13 PROCEDURE — 87102 FUNGUS ISOLATION CULTURE: CPT

## 2021-03-13 PROCEDURE — 99284 EMERGENCY DEPT VISIT MOD MDM: CPT

## 2021-03-13 PROCEDURE — 87081 CULTURE SCREEN ONLY: CPT

## 2021-03-13 RX ORDER — LIDOCAINE HYDROCHLORIDE 20 MG/ML
15 SOLUTION OROPHARYNGEAL ONCE
Status: COMPLETED | OUTPATIENT
Start: 2021-03-13 | End: 2021-03-13

## 2021-03-13 RX ORDER — ERTAPENEM 1 G/1
INJECTION, POWDER, LYOPHILIZED, FOR SOLUTION INTRAMUSCULAR; INTRAVENOUS
COMMUNITY
Start: 2021-03-11 | End: 2021-04-12 | Stop reason: ALTCHOICE

## 2021-03-13 RX ADMIN — NYSTATIN 500000 UNITS: 500000 SUSPENSION ORAL at 10:30

## 2021-03-13 RX ADMIN — LIDOCAINE HYDROCHLORIDE 15 ML: 20 SOLUTION ORAL at 10:15

## 2021-03-13 ASSESSMENT — PAIN SCALES - GENERAL: PAINLEVEL_OUTOF10: 7

## 2021-03-13 NOTE — ED PROVIDER NOTES
Emergency Physician Note  [address] [place] [address]  ED  [address] [address]  Dept: [phone]  Dept Fax: [phone]  Loc: [phone]  Open Note Time:  8:44 AM EST    Chief Complaint  Pharyngitis (having difficulty eating, started last night)       History of Present Illness  Basilio Lloyd is a 76 y.o. male  has a past medical history of Hx of blood clots, Hyperlipidemia, Hypertension, PONV (postoperative nausea and vomiting), Symptomatic cholelithiasis, and Thrombosis. who presents to the ED for right throat. Patient states starting yesterday he has noticed a sore throat and it is very painful him to swallow solids and liquids however he is able to keep anything that he does follow down. He just very concerned about not eating because he was recently admitted for ischemic bowel. Patient is currently on outpatient IV ertapenem and has a right arm PICC line. Denies any right arm pain or swelling. Patient recently discharged after a 9-day course in the hospital for ischemic colitis. He reports that his pain is very tolerable, he is happy to also report that for the last 3 days he is actually had normal regular bowel movements with firm stool. Denies fever,  chest pain, shortness of breath, cough,   nausea, vomiting, diarrhea, headache,  rash. No palliative/provocative factors. Unless otherwise stated in this report or unable to obtain because of the patient's clinical or mental status as evidenced by the medical record, this patient's positive and negative responses for review of systems, constitutional, psych, eyes, ENT, cardiovascular, respiratory, gastrointestinal, neurological, genitourinary, musculoskeletal, integument systems and systems related to the presenting problem are either stated in the preceding paragraph or were not pertinent or were negative for the symptoms and/or complaints related to the medical problem.     I have reviewed the following from the nursing documentation:      Prior to Admission medications    Medication Sig Start Date End Date Taking?  Authorizing Provider   cloNIDine (CATAPRES) 0.1 MG tablet Take 1 tablet by mouth 2 times daily 3/4/21  Yes Omid Andrews MD   metoprolol tartrate (LOPRESSOR) 25 MG tablet Take 1 tablet by mouth 2 times daily 3/4/21  Yes Omid Andrews MD   amLODIPine (NORVASC) 10 MG tablet Take 1 tablet by mouth daily 3/4/21  Yes Omid Andrews MD   mesalamine (DELZICOL) 400 MG CPDR delayed release capsule Take 2 capsules by mouth 3 times daily 3/4/21 4/3/21 Yes Omid Andrews MD   pantoprazole (PROTONIX) 40 MG tablet Take 1 tablet by mouth every morning (before breakfast) 3/4/21  Yes Omid Andrews MD   ondansetron (ZOFRAN ODT) 4 MG disintegrating tablet Take 1 tablet by mouth every 8 hours as needed for Nausea 2/21/21  Yes JOLANTA Chacko CNP   simvastatin (ZOCOR) 20 MG tablet Take 1 tablet by mouth nightly 1/26/21  Yes JOLANTA Baires CNP   fenofibrate (TRICOR) 145 MG tablet Take 1 tablet by mouth daily TAKE 1 TABLET BY MOUTH ONCE DAILY 1/26/21  Yes JOLANTA Baires CNP   rivaroxaban (XARELTO) 20 MG TABS tablet Take 10 mg by mouth daily (with breakfast)    Yes Historical Provider, MD ash August) 1 GM injection  3/11/21   Historical Provider, MD       Allergies as of 03/13/2021    (No Known Allergies)       Past Medical History:   Diagnosis Date    Hx of blood clots     Hyperlipidemia     Hypertension     PONV (postoperative nausea and vomiting)     Symptomatic cholelithiasis     Thrombosis         Surgical History:   Past Surgical History:   Procedure Laterality Date    ANKLE SURGERY      left    CHOLECYSTECTOMY, LAPAROSCOPIC N/A 8/13/2020    LAPAROSCOPIC CHOLECYSTECTOMY WITH INTRAOPERATIVE CHOLANGIOGRAM performed by Twyla Hernandez MD at [address] [place] [address]  03/08/2017    SHOULDER SURGERY Right 2012    TONSILLECTOMY      WRIST SURGERY      right Family History:    Family History   Problem Relation Age of Onset    Breast Cancer Mother     Coronary Art Dis Father     Diabetes Sister        Social History     Socioeconomic History    Marital status:      Spouse name: Nauruan Nett    Number of children: 0    Years of education: Not on file    Highest education level: Not on file   Occupational History    Not on file   Social Needs    Financial resource strain: Not on file    Food insecurity     Worry: Not on file     Inability: Not on file   [employer] needs     Medical: Not on file     Non-medical: Not on file   Tobacco Use    Smoking status: Never Smoker    Smokeless tobacco: Never Used   Substance and Sexual Activity    Alcohol use: Yes     Comment: 1-2 drinks per year    Drug use: No    Sexual activity: Yes     Partners: Female   Lifestyle    Physical activity     Days per week: Not on file     Minutes per session: Not on file    Stress: Not on file   Relationships    Social connections     Talks on phone: Not on file     Gets together: Not on file     Attends [employer] service: Not on file     Active member of club or organization: Not on file     Attends meetings of clubs or organizations: Not on file     Relationship status: Not on file    Intimate partner violence     Fear of current or ex partner: Not on file     Emotionally abused: Not on file     Physically abused: Not on file     Forced sexual activity: Not on file   Other Topics Concern    Not on file   Social History Narrative    Not on file       Nursing notes reviewed. ED Triage Vitals [03/13/21 0839]   Enc Vitals Group      BP (!) 163/75      Pulse (!) 45      Resp 16      Temp 98 °F (36.7 °C)      Temp Source Oral      SpO2 99 %      Weight 180 lb (81.6 kg)      Height       Head Circumference       Peak Flow       Pain Score       Pain Loc       Pain Edu? Excl. in [address]? GENERAL:   Body mass index is 25.1 kg/m². Awake, alert.   Well developed, well nourished with no apparent distress. Nontoxic-appearing, non-ill-appearing. HENT:   Normocephalic, Atraumatic, no lacerations. Oropharynx clear, moderate tonsilar inflammation, no punctuate tonsilar exudates -however the back of his throat does have film of white substance and on the soft palate just superior to his uvula there are 2 white spots, there is mild discoloration of his tongue it is yellowish however he does not have any scrappable lesions,  no airway obstruction, moist mucous membranes. Uvula was midline and has symmetric rise. Tympanic membranes are without bulging, without erythema, without hemotympanum, without middle ear effusion bilaterally  EYES:   Conjunctiva normal,   Pupils equal round and reactive to light,   Extraocular movements normal.  NECK:  Trachea is midline. No stridor. No lymphadenopathy of the anterior cervical chain  No lymphadenopathy of the posterior cervical chain. CHEST:  Regular rate and regular rhythm, no murmurs/rubs/gallops,  normal S1/S2, chest wall non-tender. LUNGS:  No respiratory distress. No abdominal retractions, no sternal retractions  Clear to auscultation bilaterally, no wheezing, no rhochi, no rales  Speaking comfortably in full sentences  ABDOMEN:  Soft, non-tender, non-distended. No guarding. No rebound. No costovertebral angle tenderness to palpation. Normal BS, no organomegaly, no abdominal masses  EXTREMITIES:  Moves extremities x4 with purpose. Normal range of motion, no edema,  No tenderness, no deformity,  distal pulses present and equal bilaterally. SKIN:  Warm, dry and intact. NEUROLOGIC:  Normal mental status. Moving all extremities to command. Alert and oriented x4  without focal motor deficit or gross sensory deficit. Normal speech.   PSYCHIATRIC:  Not anxious,  normal mood and affect,  Appropriate eye contact,  thoughts are linear and organized,  without delusions/hallucinations,  Not responding to internal stimuli,  responds appropriately to questions    LABS and DIAGNOSTIC RESULTS    RADIOLOGY  X-RAYS:  I have reviewed radiologic plain film image(s). ALL OTHER NON-PLAIN FILM IMAGES SUCH AS CT, ULTRASOUND AND MRI HAVE BEEN READ BY THE RADIOLOGIST. No orders to display        LABS  No results found for this visit on 03/13/21. SCREENINGS  NIH Score     [facility]     [facility]    Heart Score       PROCEDURES    MEDICAL DECISION MAKING    Procedures/interventions/images ordered for this visit  Orders Placed This Encounter   Procedures    Culture, Fungus    Strep screen group a throat    Culture, Beta Strep Confirm Plates       Medications ordered for this visit  Orders Placed This Encounter   Medications    nystatin (MYCOSTATIN) 427668 UNIT/ML suspension 500,000 Units    lidocaine viscous hcl (XYLOCAINE) 2 % solution 15 mL    nystatin (MYCOSTATIN) 245488 UNIT/ML suspension     Sig: Take 5 mLs by mouth 4 times daily for 10 days     Dispense:  200 mL     Refill:  0       ED course notes for this visit       I wore N95 Envo mask with filter protection, facial shield and gloves when I evaluated the patient. I evaluated the patient in room 20/20    Differential diagnosis: Group A strep, Airway Obstruction, Epiglottitis, Retropharyngeal Abscess, Parapharyngeal Abscess, Pneumonia, Hypoxemia, Dehydration, Reflux Pharyngitis, other    Differential diagnosis: Group A strep, Airway Obstruction, Epiglottitis, Retropharyngeal Abscess, Parapharyngeal Abscess, Pneumonia, Hypoxemia, Dehydration, Reflux Pharyngitis, other     Patient is aware of the negative rapid strep and that they will be called if the culture is positive. This is a very pleasant patient with pharyngitis but without significant evidence of Group A Beta-Hemolytic Streptococcal infection or mononucleosis by clinical and/or laboratory criteria. The patient has moderate erythematous tonsils with exudate that is consistent with candidiasis infection and no lymphadenopathy. In addition to the noted physical exam, I did not observe brawny edema, uvular deviation, menigismus, stridor, trismus, or drooling. Nontoxic appearance and no significant distress. No sign of Kevs Angina or deep space neck infection. There is no significant evidence of toxicity, shock, sepsis, airway compromise, respiratory distress, hemodynamic instability, epiglottitis, peritonsillar abscess, retropharyngeal abscess, bacterial tracheitis, other bacterial infection, acute allergic reaction or angioedema, or any disease process requiring immediate surgical intervention at this time. It is understood that if the patient is not improving as expected or if other new symptoms or signs of concern develop, other etiologies or diagnoses may need to be considered requiring other tests, treatments, consultations, and/or admission. The diagnosis, plan, expected course, follow-up, and return precautions were discussed and all questions were answered. Final Impression    1. Acute pharyngitis, unspecified etiology    2. Candidiasis of mouth        Blood pressure (!) 163/75, pulse (!) 45, temperature 98 °F (36.7 °C), temperature source Oral, resp. rate 16, weight 170 lb (77.1 kg), SpO2 99 %. Medication Summary  Patient was given scripts for the following medications. I counseled patient how to take these medications. New Prescriptions    NYSTATIN (MYCOSTATIN) [id number] UNIT/ML SUSPENSION    Take 5 mLs by mouth 4 times daily for 10 days       Patient had scripts modified or refilled for the following medications. I counseled patient how to take these medications. Modified Medications    No medications on file       Patient had scripts discontinues for the following medications. I counseled patient to stop taking these medications. Discontinued Medications    No medications on file       Disposition  At this point I do not feel the patient requires further work up and it is reasonable to discharge the patient.  I had

## 2021-03-15 ENCOUNTER — HOSPITAL ENCOUNTER (OUTPATIENT)
Dept: CT IMAGING | Age: 66
Discharge: HOME | End: 2021-03-15
Payer: MEDICARE

## 2021-03-15 ENCOUNTER — TELEPHONE (OUTPATIENT)
Dept: INFECTIOUS DISEASES | Age: 66
End: 2021-03-15

## 2021-03-15 DIAGNOSIS — K52.9 COLITIS: ICD-10-CM

## 2021-03-15 LAB — S PYO THROAT QL CULT: NORMAL

## 2021-03-15 PROCEDURE — 6360000004 HC RX CONTRAST MEDICATION: Performed by: INTERNAL MEDICINE

## 2021-03-15 PROCEDURE — 74176 CT ABD & PELVIS W/O CONTRAST: CPT

## 2021-03-15 RX ADMIN — IOHEXOL 50 ML: 240 INJECTION, SOLUTION INTRATHECAL; INTRAVASCULAR; INTRAVENOUS; ORAL at 14:04

## 2021-03-15 NOTE — TELEPHONE ENCOUNTER
Admission 2/24-3/4/21 with non-resolving colitis, presumed ischemic   DC on IV abx     Repeat CT completed today  Impression   1. Persistent pericolonic stranding surrounding the left colon, which does   not appear to have changed significantly in extent.  There is overall   decreased but persistent bowel wall thickening.  Findings may reflect ongoing   colitis. 2. Trace fluid in the left pericolic gutter.  No free air.         Tolerating regular food  Having normal BM   No pain    Taking mesalamine per GI.   Path reportedly with ischemic changes      I called the patient  \"I'm feeling a lot better\"    Planned end date 3/18/21    Given CT findings, will recommend extending IV abx to 3/29/21

## 2021-03-16 ENCOUNTER — HOSPITAL ENCOUNTER (OUTPATIENT)
Age: 66
Setting detail: SPECIMEN
Discharge: HOME | End: 2021-03-16
Payer: MEDICARE

## 2021-03-16 LAB
A/G RATIO: 1.8 (ref 1.1–2.2)
ALBUMIN SERPL-MCNC: 3.9 G/DL (ref 3.4–5)
ALP BLD-CCNC: 48 U/L (ref 40–129)
ALT SERPL-CCNC: 17 U/L (ref 10–40)
ANION GAP SERPL CALCULATED.3IONS-SCNC: 10 MMOL/L (ref 3–16)
AST SERPL-CCNC: 17 U/L (ref 15–37)
BASOPHILS ABSOLUTE: 0 K/UL (ref 0–0.2)
BASOPHILS RELATIVE PERCENT: 0.8 %
BILIRUB SERPL-MCNC: 0.3 MG/DL (ref 0–1)
BUN BLDV-MCNC: 22 MG/DL (ref 7–20)
CALCIUM SERPL-MCNC: 9 MG/DL (ref 8.3–10.6)
CHLORIDE BLD-SCNC: 106 MMOL/L (ref 99–110)
CO2: 26 MMOL/L (ref 21–32)
CREAT SERPL-MCNC: 1.3 MG/DL (ref 0.8–1.3)
EOSINOPHILS ABSOLUTE: 0.2 K/UL (ref 0–0.6)
EOSINOPHILS RELATIVE PERCENT: 5.3 %
GFR AFRICAN AMERICAN: >60
GFR NON-AFRICAN AMERICAN: 55
GLOBULIN: 2.2 G/DL
GLUCOSE BLD-MCNC: 113 MG/DL (ref 70–99)
HCT VFR BLD CALC: 33.9 % (ref 40.5–52.5)
HEMOGLOBIN: 11.2 G/DL (ref 13.5–17.5)
LYMPHOCYTES ABSOLUTE: 1.1 K/UL (ref 1–5.1)
LYMPHOCYTES RELATIVE PERCENT: 28.2 %
MCH RBC QN AUTO: 27.1 PG (ref 26–34)
MCHC RBC AUTO-ENTMCNC: 33 G/DL (ref 31–36)
MCV RBC AUTO: 82.2 FL (ref 80–100)
MONOCYTES ABSOLUTE: 0.3 K/UL (ref 0–1.3)
MONOCYTES RELATIVE PERCENT: 9.2 %
NEUTROPHILS ABSOLUTE: 2.1 K/UL (ref 1.7–7.7)
NEUTROPHILS RELATIVE PERCENT: 56.5 %
PDW BLD-RTO: 14.6 % (ref 12.4–15.4)
PLATELET # BLD: 237 K/UL (ref 135–450)
PMV BLD AUTO: 8.3 FL (ref 5–10.5)
POTASSIUM SERPL-SCNC: 4.5 MMOL/L (ref 3.5–5.1)
RBC # BLD: 4.12 M/UL (ref 4.2–5.9)
SODIUM BLD-SCNC: 142 MMOL/L (ref 136–145)
TOTAL PROTEIN: 6.1 G/DL (ref 6.4–8.2)
WBC # BLD: 3.8 K/UL (ref 4–11)

## 2021-03-16 PROCEDURE — 85025 COMPLETE CBC W/AUTO DIFF WBC: CPT

## 2021-03-16 PROCEDURE — 36415 COLL VENOUS BLD VENIPUNCTURE: CPT

## 2021-03-16 PROCEDURE — 80053 COMPREHEN METABOLIC PANEL: CPT

## 2021-03-16 NOTE — TELEPHONE ENCOUNTER
[facility] at [facility] given order to extend IV antibiotics through 3/29/201

## 2021-03-22 ENCOUNTER — HOSPITAL ENCOUNTER (OUTPATIENT)
Age: 66
Discharge: HOME | End: 2021-03-22
Payer: MEDICARE

## 2021-03-22 LAB
A/G RATIO: 1.7 (ref 1.1–2.2)
ALBUMIN SERPL-MCNC: 3.8 G/DL (ref 3.4–5)
ALP BLD-CCNC: 37 U/L (ref 40–129)
ALT SERPL-CCNC: 14 U/L (ref 10–40)
ANION GAP SERPL CALCULATED.3IONS-SCNC: 9 MMOL/L (ref 3–16)
AST SERPL-CCNC: 19 U/L (ref 15–37)
BASOPHILS ABSOLUTE: 0 K/UL (ref 0–0.2)
BASOPHILS RELATIVE PERCENT: 1 %
BILIRUB SERPL-MCNC: 0.3 MG/DL (ref 0–1)
BUN BLDV-MCNC: 26 MG/DL (ref 7–20)
CALCIUM SERPL-MCNC: 8.8 MG/DL (ref 8.3–10.6)
CHLORIDE BLD-SCNC: 104 MMOL/L (ref 99–110)
CO2: 26 MMOL/L (ref 21–32)
CREAT SERPL-MCNC: 1.7 MG/DL (ref 0.8–1.3)
EOSINOPHILS ABSOLUTE: 0.2 K/UL (ref 0–0.6)
EOSINOPHILS RELATIVE PERCENT: 4.3 %
GFR AFRICAN AMERICAN: 49
GFR NON-AFRICAN AMERICAN: 40
GLOBULIN: 2.3 G/DL
GLUCOSE BLD-MCNC: 110 MG/DL (ref 70–99)
HCT VFR BLD CALC: 32.1 % (ref 40.5–52.5)
HEMOGLOBIN: 10.6 G/DL (ref 13.5–17.5)
LYMPHOCYTES ABSOLUTE: 1.1 K/UL (ref 1–5.1)
LYMPHOCYTES RELATIVE PERCENT: 27.2 %
MCH RBC QN AUTO: 27.2 PG (ref 26–34)
MCHC RBC AUTO-ENTMCNC: 33 G/DL (ref 31–36)
MCV RBC AUTO: 82.3 FL (ref 80–100)
MONOCYTES ABSOLUTE: 0.4 K/UL (ref 0–1.3)
MONOCYTES RELATIVE PERCENT: 10.3 %
NEUTROPHILS ABSOLUTE: 2.2 K/UL (ref 1.7–7.7)
NEUTROPHILS RELATIVE PERCENT: 57.2 %
PDW BLD-RTO: 15.7 % (ref 12.4–15.4)
PLATELET # BLD: 157 K/UL (ref 135–450)
PMV BLD AUTO: 9 FL (ref 5–10.5)
POTASSIUM SERPL-SCNC: 4.4 MMOL/L (ref 3.5–5.1)
RBC # BLD: 3.89 M/UL (ref 4.2–5.9)
SODIUM BLD-SCNC: 139 MMOL/L (ref 136–145)
TOTAL PROTEIN: 6.1 G/DL (ref 6.4–8.2)
WBC # BLD: 3.9 K/UL (ref 4–11)

## 2021-03-22 PROCEDURE — 85025 COMPLETE CBC W/AUTO DIFF WBC: CPT

## 2021-03-22 PROCEDURE — 80053 COMPREHEN METABOLIC PANEL: CPT

## 2021-03-22 PROCEDURE — 36415 COLL VENOUS BLD VENIPUNCTURE: CPT

## 2021-03-30 ENCOUNTER — HOSPITAL ENCOUNTER (OUTPATIENT)
Age: 66
Setting detail: SPECIMEN
Discharge: HOME | End: 2021-03-30
Payer: MEDICARE

## 2021-03-30 LAB
A/G RATIO: 1.8 (ref 1.1–2.2)
ALBUMIN SERPL-MCNC: 4.3 G/DL (ref 3.4–5)
ALP BLD-CCNC: 41 U/L (ref 40–129)
ALT SERPL-CCNC: 15 U/L (ref 10–40)
ANION GAP SERPL CALCULATED.3IONS-SCNC: 9 MMOL/L (ref 3–16)
AST SERPL-CCNC: 19 U/L (ref 15–37)
BASOPHILS ABSOLUTE: 0 K/UL (ref 0–0.2)
BASOPHILS RELATIVE PERCENT: 0.6 %
BILIRUB SERPL-MCNC: 0.3 MG/DL (ref 0–1)
BUN BLDV-MCNC: 26 MG/DL (ref 7–20)
CALCIUM SERPL-MCNC: 9.1 MG/DL (ref 8.3–10.6)
CHLORIDE BLD-SCNC: 104 MMOL/L (ref 99–110)
CO2: 27 MMOL/L (ref 21–32)
CREAT SERPL-MCNC: 1.5 MG/DL (ref 0.8–1.3)
EOSINOPHILS ABSOLUTE: 0.2 K/UL (ref 0–0.6)
EOSINOPHILS RELATIVE PERCENT: 4 %
GFR AFRICAN AMERICAN: 57
GFR NON-AFRICAN AMERICAN: 47
GLOBULIN: 2.4 G/DL
GLUCOSE BLD-MCNC: 213 MG/DL (ref 70–99)
HCT VFR BLD CALC: 35.5 % (ref 40.5–52.5)
HEMOGLOBIN: 11.7 G/DL (ref 13.5–17.5)
LYMPHOCYTES ABSOLUTE: 1 K/UL (ref 1–5.1)
LYMPHOCYTES RELATIVE PERCENT: 25.1 %
MCH RBC QN AUTO: 27.3 PG (ref 26–34)
MCHC RBC AUTO-ENTMCNC: 33 G/DL (ref 31–36)
MCV RBC AUTO: 82.6 FL (ref 80–100)
MONOCYTES ABSOLUTE: 0.2 K/UL (ref 0–1.3)
MONOCYTES RELATIVE PERCENT: 5.7 %
NEUTROPHILS ABSOLUTE: 2.5 K/UL (ref 1.7–7.7)
NEUTROPHILS RELATIVE PERCENT: 64.6 %
PDW BLD-RTO: 15.6 % (ref 12.4–15.4)
PLATELET # BLD: 136 K/UL (ref 135–450)
PMV BLD AUTO: 9.1 FL (ref 5–10.5)
POTASSIUM SERPL-SCNC: 4.6 MMOL/L (ref 3.5–5.1)
RBC # BLD: 4.29 M/UL (ref 4.2–5.9)
SODIUM BLD-SCNC: 140 MMOL/L (ref 136–145)
TOTAL PROTEIN: 6.7 G/DL (ref 6.4–8.2)
WBC # BLD: 3.9 K/UL (ref 4–11)

## 2021-03-30 PROCEDURE — 36415 COLL VENOUS BLD VENIPUNCTURE: CPT

## 2021-03-30 PROCEDURE — 80053 COMPREHEN METABOLIC PANEL: CPT

## 2021-03-30 PROCEDURE — 85025 COMPLETE CBC W/AUTO DIFF WBC: CPT

## 2021-03-31 ENCOUNTER — TELEPHONE (OUTPATIENT)
Dept: INFECTIOUS DISEASES | Age: 66
End: 2021-03-31

## 2021-03-31 NOTE — TELEPHONE ENCOUNTER
Called patient  On IV abx for suspected colitis  Last dose was 3/29/21    Overall doing well  With very deep breaths he feels a slight discomfort, otherwise pain free  Normal BM  Po intake good, gained 8# since hospital DC  Saw GI about 2 weeks ago and again 5/13/21    Will DC abx and have PICC removed   Monitor expectantly   Call with concerns     All questions answered

## 2021-04-05 ASSESSMENT — LIFESTYLE VARIABLES
HOW OFTEN DURING THE LAST YEAR HAVE YOU BEEN UNABLE TO REMEMBER WHAT HAPPENED THE NIGHT BEFORE BECAUSE YOU HAD BEEN DRINKING: NEVER
HOW MANY STANDARD DRINKS CONTAINING ALCOHOL DO YOU HAVE ON A TYPICAL DAY: ONE OR TWO
HOW OFTEN DURING THE LAST YEAR HAVE YOU FAILED TO DO WHAT WAS NORMALLY EXPECTED FROM YOU BECAUSE OF DRINKING: 0
AUDIT-C TOTAL SCORE: 0
HOW OFTEN DO YOU HAVE A DRINK CONTAINING ALCOHOL: 1
HOW OFTEN DURING THE LAST YEAR HAVE YOU NEEDED AN ALCOHOLIC DRINK FIRST THING IN THE MORNING TO GET YOURSELF GOING AFTER A NIGHT OF HEAVY DRINKING: 0
HAS A RELATIVE, FRIEND, DOCTOR, OR ANOTHER HEALTH PROFESSIONAL EXPRESSED CONCERN ABOUT YOUR DRINKING OR SUGGESTED YOU CUT DOWN: NO
HOW OFTEN DURING THE LAST YEAR HAVE YOU NEEDED AN ALCOHOLIC DRINK FIRST THING IN THE MORNING TO GET YOURSELF GOING AFTER A NIGHT OF HEAVY DRINKING: NEVER
HOW OFTEN DURING THE LAST YEAR HAVE YOU FOUND THAT YOU WERE NOT ABLE TO STOP DRINKING ONCE YOU HAD STARTED: NEVER
AUDIT TOTAL SCORE: 1
HOW OFTEN DURING THE LAST YEAR HAVE YOU HAD A FEELING OF GUILT OR REMORSE AFTER DRINKING: NEVER
HAVE YOU OR SOMEONE ELSE BEEN INJURED AS A RESULT OF YOUR DRINKING: NO
HOW OFTEN DO YOU HAVE A DRINK CONTAINING ALCOHOL: MONTHLY OR LESS
HOW OFTEN DURING THE LAST YEAR HAVE YOU FAILED TO DO WHAT WAS NORMALLY EXPECTED FROM YOU BECAUSE OF DRINKING: NEVER
HOW OFTEN DO YOU HAVE SIX OR MORE DRINKS ON ONE OCCASION: NEVER
HOW OFTEN DURING THE LAST YEAR HAVE YOU HAD A FEELING OF GUILT OR REMORSE AFTER DRINKING: 0
HOW OFTEN DURING THE LAST YEAR HAVE YOU FOUND THAT YOU WERE NOT ABLE TO STOP DRINKING ONCE YOU HAD STARTED: 0
AUDIT TOTAL SCORE: 0
HAS A RELATIVE, FRIEND, DOCTOR, OR ANOTHER HEALTH PROFESSIONAL EXPRESSED CONCERN ABOUT YOUR DRINKING OR SUGGESTED YOU CUT DOWN: 0

## 2021-04-05 ASSESSMENT — PATIENT HEALTH QUESTIONNAIRE - PHQ9
2. FEELING DOWN, DEPRESSED OR HOPELESS: 0
SUM OF ALL RESPONSES TO PHQ QUESTIONS 1-9: 1
SUM OF ALL RESPONSES TO PHQ QUESTIONS 1-9: 1

## 2021-04-12 ENCOUNTER — IMMUNIZATION (OUTPATIENT)
Dept: PRIMARY CARE CLINIC | Age: 66
End: 2021-04-12
Payer: MEDICARE

## 2021-04-12 ENCOUNTER — OFFICE VISIT (OUTPATIENT)
Dept: FAMILY MEDICINE CLINIC | Age: 66
End: 2021-04-12
Payer: MEDICARE

## 2021-04-12 VITALS
DIASTOLIC BLOOD PRESSURE: 78 MMHG | BODY MASS INDEX: 26.32 KG/M2 | WEIGHT: 188 LBS | HEIGHT: 71 IN | HEART RATE: 94 BPM | OXYGEN SATURATION: 100 % | SYSTOLIC BLOOD PRESSURE: 154 MMHG

## 2021-04-12 DIAGNOSIS — Z00.00 ROUTINE GENERAL MEDICAL EXAMINATION AT A HEALTH CARE FACILITY: Primary | ICD-10-CM

## 2021-04-12 DIAGNOSIS — R73.9 HYPERGLYCEMIA: ICD-10-CM

## 2021-04-12 LAB
FUNGUS (MYCOLOGY) CULTURE: ABNORMAL
FUNGUS STAIN: ABNORMAL
HBA1C MFR BLD: 6.6 %
ORGANISM: ABNORMAL

## 2021-04-12 PROCEDURE — 1123F ACP DISCUSS/DSCN MKR DOCD: CPT | Performed by: FAMILY MEDICINE

## 2021-04-12 PROCEDURE — 4040F PNEUMOC VAC/ADMIN/RCVD: CPT | Performed by: FAMILY MEDICINE

## 2021-04-12 PROCEDURE — 3017F COLORECTAL CA SCREEN DOC REV: CPT | Performed by: FAMILY MEDICINE

## 2021-04-12 PROCEDURE — 91301 COVID-19, MODERNA VACCINE 100MCG/0.5ML DOSE: CPT | Performed by: FAMILY MEDICINE

## 2021-04-12 PROCEDURE — 0011A COVID-19, MODERNA VACCINE 100MCG/0.5ML DOSE: CPT | Performed by: FAMILY MEDICINE

## 2021-04-12 PROCEDURE — G0438 PPPS, INITIAL VISIT: HCPCS | Performed by: FAMILY MEDICINE

## 2021-04-12 PROCEDURE — 83036 HEMOGLOBIN GLYCOSYLATED A1C: CPT | Performed by: FAMILY MEDICINE

## 2021-04-12 NOTE — PROGRESS NOTES
Medicare Annual Wellness Visit  Name: Tano Mullins Date: 2021   MRN: <[id number]> Sex: Male   Age: 76 y.o. Ethnicity: Non-/Non    : 1955 Race: Dae Peres is here for Medicare AWV (Patient is here for  [facility] Annual Wellness Visit. He is not fasting for blood work.)    Screenings for behavioral, psychosocial and functional/safety risks, and cognitive dysfunction are all negative except as indicated below. These results, as well as other patient data from the [address] form, are documented in Flowsheets linked to this Encounter. No Known Allergies    Prior to Visit Medications    Medication Sig Taking?  Authorizing Provider   metoprolol tartrate (LOPRESSOR) 25 MG tablet Take 1 tablet by mouth 2 times daily Yes Deepti Lennon MD   amLODIPine (NORVASC) 10 MG tablet Take 1 tablet by mouth daily Yes Deepti Lennon MD   simvastatin (ZOCOR) 20 MG tablet Take 1 tablet by mouth nightly Yes JOLANTA Martell CNP   fenofibrate (TRICOR) 145 MG tablet Take 1 tablet by mouth daily TAKE 1 TABLET BY MOUTH ONCE DAILY Yes JOLANTA Martell CNP   rivaroxaban (XARELTO) 20 MG TABS tablet Take 10 mg by mouth daily (with breakfast)  Yes Historical Provider, MD   mesalamine (DELZICOL) 400 MG CPDR delayed release capsule Take 2 capsules by mouth 3 times daily  Deepti Lennon MD       Past Medical History:   Diagnosis Date    Hx of blood clots     Hyperlipidemia     Hypertension     PONV (postoperative nausea and vomiting)     Symptomatic cholelithiasis     Thrombosis        Past Surgical History:   Procedure Laterality Date    ANKLE SURGERY      left    CHOLECYSTECTOMY, LAPAROSCOPIC N/A 2020    LAPAROSCOPIC CHOLECYSTECTOMY WITH INTRAOPERATIVE CHOLANGIOGRAM performed by Julio Munoz MD at [address]  2017    SHOULDER SURGERY Right 2012    TONSILLECTOMY      WRIST SURGERY      right       Family History   Problem Relation Age of Onset    Breast Cancer Mother     Coronary Art Dis Father     Diabetes Sister        Hilaria (Including outside providers/suppliers regularly involved in providing care):   Patient Care Team:  Liane Azul DO as PCP - General (Family Medicine)  Liane Azul DO as PCP - [facility] Empaneled Provider  Katelin Montano MD as Consulting Physician (Interventional Cardiology)  Clarisse Luna MD as Surgeon (Gastroenterology)  Mathew Espitia MD as Consulting Physician (Nephrology)    Wt Readings from Last 3 Encounters:   04/12/21 188 lb (85.3 kg)   03/13/21 170 lb (77.1 kg)   02/24/21 180 lb 9.6 oz (81.9 kg)     Vitals:    04/12/21 1307 04/12/21 1308   BP: (!) 152/80 (!) 154/78   Pulse: 94    SpO2: 100%    Weight: 188 lb (85.3 kg)    Height: 5' 11\" (1.803 m)      Body mass index is 26.22 kg/m². Based upon direct observation of the patient, evaluation of cognition reveals recent and remote memory intact.     General Appearance: alert and oriented to person, place and time, well developed and well- nourished, in no acute distress  Skin: warm and dry, no rash or erythema  Head: normocephalic and atraumatic  Eyes: pupils equal, round, and reactive to light, extraocular eye movements intact, conjunctivae normal  ENT: tympanic membrane, external ear and ear canal normal bilaterally, nose without deformity, nasal mucosa and turbinates normal without polyps  Neck: supple and non-tender without mass, no thyromegaly or thyroid nodules, no cervical lymphadenopathy  Pulmonary/Chest: clear to auscultation bilaterally- no wheezes, rales or rhonchi, normal air movement, no respiratory distress  Cardiovascular: normal rate, regular rhythm, normal S1 and S2, no murmurs, rubs, clicks, or gallops, distal pulses intact, no carotid bruits  Abdomen: soft, non-tender, non-distended, normal bowel sounds, no masses or organomegaly  Extremities: no cyanosis, clubbing or edema  Musculoskeletal: normal range of motion, no joint swelling, deformity or tenderness  Neurologic: reflexes normal and symmetric, no cranial nerve deficit, gait, coordination and speech normal    Patient's complete Health Risk Assessment and screening values have been reviewed and are found in Flowsheets. The following problems were reviewed today and where indicated follow up appointments were made and/or referrals ordered. Positive Risk Factor Screenings with Interventions:          General Health and ACP:  General  In general, how would you say your health is?: Good  In the past 7 days, have you experienced any of the following?  New or Increased Pain, New or Increased Fatigue, Loneliness, Social Isolation, Stress or Anger?: None of These  Do you get the social and emotional support that you need?: Yes  Do you have a Living Will?: Yes  Advance Directives     Power of [address] [place] [address] Will ACP-Advance Directive ACP-Power of     Not on File Not on File Not on File Not on File      General Health Risk Interventions:  · recent colitis    Health Habits/Nutrition:  Health Habits/Nutrition  Do you exercise for at least 20 minutes 2-3 times per week?: (!) No  Have you lost any weight without trying in the past 3 months?: (!) Yes  Do you eat only one meal per day?: No  Have you seen the dentist within the past year?: Yes  Body mass index: (!) 26.22  Health Habits/Nutrition Interventions:  · Nutritional issues:  weight loss due to recent stomach infection       Personalized Preventive Plan   Current Health Maintenance Status  Immunization History   Administered Date(s) Administered    Influenza Virus Vaccine 10/01/2017    Influenza, High Dose (Fluzone 65 yrs and older) 09/13/2019    Influenza, MDCK Quadv, IM, PF (Flucelvax 4 yrs and older) 10/12/2018    Influenza, Quadv, adjuvanted, 65 yrs +, IM, PF (Fluad) 09/07/2020    Zoster Live (Zostavax) 08/01/2016        Health Maintenance   Topic Date Due    COVID-19 Vaccine (1) Never done    DTaP/Tdap/Td vaccine (1 - Tdap) Never done    Shingles Vaccine (2 of 3) 09/26/2016    Pneumococcal 65+ years Vaccine (1 of 1 - PPSV23) Never done   [employer] Visit (AWV)  Never done    Lipid screen  02/20/2021    Potassium monitoring  03/30/2022    Creatinine monitoring  03/30/2022    Colon cancer screen colonoscopy  03/08/2028    Flu vaccine  Completed    Hepatitis C screen  Completed    Hepatitis A vaccine  Aged Out    Hepatitis B vaccine  Aged Out    Hib vaccine  Aged Out    Meningococcal (ACWY) vaccine  Aged Out     Recommendations for Trust Metrics Due: see orders and patient instructions/AVS.  . Recommended screening schedule for the next 5-10 years is provided to the patient in written form: see Patient Media Camera was seen today for medicare awv.     Diagnoses and all orders for this visit:    Routine general medical examination at a health care facility           Get COVID vaccine  Pneumonia shot after finished with covid vaccine

## 2021-05-06 ENCOUNTER — HOSPITAL ENCOUNTER (OUTPATIENT)
Age: 66
Discharge: HOME | End: 2021-05-06
Payer: MEDICARE

## 2021-05-06 DIAGNOSIS — N18.32 STAGE 3B CHRONIC KIDNEY DISEASE (HCC): ICD-10-CM

## 2021-05-06 DIAGNOSIS — I10 ESSENTIAL HYPERTENSION: ICD-10-CM

## 2021-05-06 LAB
ALBUMIN SERPL-MCNC: 5.1 G/DL (ref 3.4–5)
ANION GAP SERPL CALCULATED.3IONS-SCNC: 14 MMOL/L (ref 3–16)
BUN BLDV-MCNC: 27 MG/DL (ref 7–20)
CALCIUM SERPL-MCNC: 9.2 MG/DL (ref 8.3–10.6)
CHLORIDE BLD-SCNC: 105 MMOL/L (ref 99–110)
CO2: 23 MMOL/L (ref 21–32)
CREAT SERPL-MCNC: 1.2 MG/DL (ref 0.8–1.3)
CREATININE URINE: 170.6 MG/DL (ref 39–259)
GFR AFRICAN AMERICAN: >60
GFR NON-AFRICAN AMERICAN: >60
GLUCOSE BLD-MCNC: 122 MG/DL (ref 70–99)
MICROALBUMIN UR-MCNC: 2.1 MG/DL
MICROALBUMIN/CREAT UR-RTO: 12.3 MG/G (ref 0–30)
PHOSPHORUS: 3.4 MG/DL (ref 2.5–4.9)
POTASSIUM SERPL-SCNC: 4.4 MMOL/L (ref 3.5–5.1)
SODIUM BLD-SCNC: 142 MMOL/L (ref 136–145)

## 2021-05-06 PROCEDURE — 82043 UR ALBUMIN QUANTITATIVE: CPT

## 2021-05-06 PROCEDURE — 80069 RENAL FUNCTION PANEL: CPT

## 2021-05-06 PROCEDURE — 36415 COLL VENOUS BLD VENIPUNCTURE: CPT

## 2021-05-06 PROCEDURE — 82570 ASSAY OF URINE CREATININE: CPT

## 2021-05-10 ENCOUNTER — IMMUNIZATION (OUTPATIENT)
Dept: PRIMARY CARE CLINIC | Age: 66
End: 2021-05-10
Payer: MEDICARE

## 2021-05-10 PROCEDURE — 91301 COVID-19, MODERNA VACCINE 100MCG/0.5ML DOSE: CPT

## 2021-05-10 PROCEDURE — 0012A COVID-19, MODERNA VACCINE 100MCG/0.5ML DOSE: CPT

## 2021-05-27 ENCOUNTER — HOSPITAL ENCOUNTER (OUTPATIENT)
Dept: CT IMAGING | Age: 66
Discharge: HOME | End: 2021-05-27
Payer: MEDICARE

## 2021-05-27 DIAGNOSIS — K55.9 ISCHEMIC COLITIS (HCC): ICD-10-CM

## 2021-05-27 PROCEDURE — 74150 CT ABDOMEN W/O CONTRAST: CPT

## 2021-05-27 PROCEDURE — 6360000004 HC RX CONTRAST MEDICATION: Performed by: NURSE PRACTITIONER

## 2021-05-27 RX ADMIN — IOHEXOL 50 ML: 240 INJECTION, SOLUTION INTRATHECAL; INTRAVASCULAR; INTRAVENOUS; ORAL at 15:15

## 2021-07-26 NOTE — PROGRESS NOTES
[facility]   CARDIAC EVALUATION NOTE  [phone]      PCP:  [facility] DO DOLORES    Reason for Consultation/Chief Complaint: follow up for HTN and HLD    Subjective   History of Present Illness:  Ranjeet Augustin is a 76 y.o. patient with a history of HTN, HLD and mesenteric vein thrombosis who presents to establish with cardiology. He moved from [place], [place] in 10/2016. He reports he started seeing Dr. Whit Álvarez at age 47 for about 10 years. He started seeing the cardiologist for screening due to his father's vascular and heart disease. He has had 3 stress tests with the cardiologist. He has since loss 30 lbs of weight since retiring. He has made several diet changes. He had mesenteric vein thrombosis in 02/2018. He was on Xarelto for a short period and has since stopped the Xarelto. At his OV on 12/09/19 he was started on Norvasc 2.5 mg daily. This was increased to twice a day on 9/16/2020. Then increased to 10 mg daily on 3/4/2021 in the ED. Today he reports that he has been doing fine from a cardiac standpoint. Patient denies chest pain, sob, palpitations, dizziness or syncope. He has had his blood pressure medications changed lately by PCP. He notes that Dr. Kymberly Collins is following him for his Xarelto management. He has been monitoring BP at home and it has been in 150's. Past Medical History:   has a past medical history of Hx of blood clots, Hyperlipidemia, Hypertension, PONV (postoperative nausea and vomiting), Symptomatic cholelithiasis, and Thrombosis. Surgical History:   has a past surgical history that includes Ankle surgery; Wrist surgery; Tonsillectomy; shoulder surgery (Right, 2012); Colonoscopy (03/08/2017); and Cholecystectomy, laparoscopic (N/A, 8/13/2020). Social History:   reports that he has never smoked. He has never used smokeless tobacco. He reports current alcohol use. He reports that he does not use drugs.      Family History:  family history includes Breast Cancer in his mother; Coronary Art Dis in his father; Diabetes in his sister. Home Medications:  Were reviewed and are listed in nursing record and/or below  Prior to Admission medications    Medication Sig Start Date End Date Taking? Authorizing Provider   amLODIPine (NORVASC) 10 MG tablet Take 1 tablet by mouth daily 3/4/21  Yes Lydia Field MD   simvastatin (ZOCOR) 20 MG tablet Take 1 tablet by mouth nightly 1/26/21  Yes JOLANTA Zendejas CNP   fenofibrate (TRICOR) 145 MG tablet Take 1 tablet by mouth daily TAKE 1 TABLET BY MOUTH ONCE DAILY 1/26/21  Yes JOLANTA Zendejas CNP   rivaroxaban (XARELTO) 20 MG TABS tablet Take 20 mg by mouth daily (with breakfast)    Yes Historical Provider, MD   metoprolol tartrate (LOPRESSOR) 25 MG tablet Take 1 tablet by mouth 2 times daily  Patient not taking: Reported on 5/10/2021 3/4/21   Lydia Field MD   mesalamine (DELZICOL) 400 MG CPDR delayed release capsule Take 2 capsules by mouth 3 times daily  Patient not taking: Reported on 7/27/2021 3/4/21 4/3/21  Lydia Field MD          Allergies:  Patient has no known allergies. Review of Systems:   A 14 point review of symptoms completed. Pertinent positives identified in the HPI, all other review of symptoms negative as below.       Objective   PHYSICAL EXAM:    Vitals:    07/27/21 1114   BP: (!) 160/80   Pulse: 56   SpO2: 98%    Weight: 201 lb (91.2 kg)         General Appearance:  Alert, cooperative, no distress, appears stated age   Neck: Supple, symmetrical, no carotid bruit or JVD   Lungs:   Clear to auscultation bilaterally, respirations unlabored   Chest Wall:  No deformity or tenderness   Heart:  Regular rate and rhythm, S1, S2 normal,  1/6 sys Murmur present, no rub or gallop   Abdomen:   Soft, non-tender, bowel sounds active all four quadrants,  no masses, no organomegaly   Extremities: Extremities normal, atraumatic, no cyanosis or edema   Pulses: 2+ and symmetric   Skin: Skin color, texture, turgor normal, no rashes or lesions   Pysch: Normal mood and affect   Neurologic: Normal gross motor and sensory exam.         Labs   CBC:   Lab Results   Component Value Date    WBC 3.9 2021    RBC 4.29 2021    HGB 11.7 2021    HCT 35.5 2021    MCV 82.6 2021    RDW 15.6 2021     2021     CMP:  Lab Results   Component Value Date     2021    K 4.4 2021    K 3.8 2021     2021    CO2 23 2021    BUN 27 2021    CREATININE 1.2 2021    GFRAA >60 2021    AGRATIO 1.8 2021    LABGLOM >60 2021    GLUCOSE 122 2021    PROT 6.7 2021    CALCIUM 9.2 2021    BILITOT 0.3 2021    ALKPHOS 41 2021    AST 19 2021    ALT 15 2021     PT/INR:  No results found for: PTINR  HgBA1c:  Lab Results   Component Value Date    LABA1C 6.6 2021     No results found for: CKTOTAL, CKMB, CKMBINDEX, TROPONINI  Lab Results   Component Value Date    CHOL 130 2020    CHOL 123 2018     Lab Results   Component Value Date    TRIG 74 2021    TRIG 112 2020    TRIG 194 (H) 2018     Lab Results   Component Value Date    HDL 41 2020    HDL 33 (L) 2018     Lab Results   Component Value Date    LDLCALC 67 2020    LDLCALC 51 2018     Lab Results   Component Value Date    LABVLDL 22 2020    LABVLDL 39 2018     No results found for: CHOLHDLRATIO      Cardiac Data     Last EK20  SR HR 63     Echo: 2020  Summary   Normal left ventricle size, wall thickness, and systolic function with a   visually estimated ejection fraction of 55%. No regional wall motion abnormalities are seen. Normal left ventricular diastolic filling pressure. No significant valvular disease. Stress Test:    Cath:    Studies:       I have reviewed labs and imaging/xray/diagnostic testing in this note. Assessment      1.  Essential hypertension    2. Mixed hyperlipidemia    3. Murmur              Plan   1. Recommend that you start coreg 3.125 mg twice a day for better blood pressure control  2. Continue to monitor BP at home  3. Recheck fasting cholesterol  4. Follow up in a year        Scribe's attestation: This note was scribed in the presence of Linn Glover MD by Elizabeth Bean RN. Thank you for allowing us to participate in the care of Jermaie Garcia. Please call me with any questions [phone]. Linn Glover MD, [facility]   Interventional Cardiologist  [facility]  [phone] [facility]  [phone] [address] [place] [address]  7/27/2021 11:25 AM    I will address the patient's cardiac risk factors and adjusted pharmacologic treatment as needed. In addition, I have reinforced the need for patient directed risk factor modification. Tobacco use was discussed with the patient and educated on the negative effects and was asked not to use. All questions and concerns were addressed to the patient/family. Alternatives to my treatment were discussed. I, Dr Linn Glover, personally performed the services described in this documentation, as scribed by the above signed scribe in my presence. It is both accurate and complete to my knowledge. I agree with the details independently gathered by the clinical support staff and the scribed note accurately describes my personal service to the patient.

## 2021-07-27 ENCOUNTER — OFFICE VISIT (OUTPATIENT)
Dept: CARDIOLOGY CLINIC | Age: 66
End: 2021-07-27
Payer: MEDICARE

## 2021-07-27 VITALS
SYSTOLIC BLOOD PRESSURE: 160 MMHG | OXYGEN SATURATION: 98 % | DIASTOLIC BLOOD PRESSURE: 80 MMHG | HEIGHT: 71 IN | HEART RATE: 56 BPM | WEIGHT: 201 LBS | BODY MASS INDEX: 28.14 KG/M2

## 2021-07-27 DIAGNOSIS — E78.2 MIXED HYPERLIPIDEMIA: Primary | ICD-10-CM

## 2021-07-27 DIAGNOSIS — I10 ESSENTIAL HYPERTENSION: ICD-10-CM

## 2021-07-27 DIAGNOSIS — R01.1 MURMUR: ICD-10-CM

## 2021-07-27 PROCEDURE — 1036F TOBACCO NON-USER: CPT | Performed by: INTERNAL MEDICINE

## 2021-07-27 PROCEDURE — 99214 OFFICE O/P EST MOD 30 MIN: CPT | Performed by: INTERNAL MEDICINE

## 2021-07-27 PROCEDURE — 4040F PNEUMOC VAC/ADMIN/RCVD: CPT | Performed by: INTERNAL MEDICINE

## 2021-07-27 PROCEDURE — G8417 CALC BMI ABV UP PARAM F/U: HCPCS | Performed by: INTERNAL MEDICINE

## 2021-07-27 PROCEDURE — 3017F COLORECTAL CA SCREEN DOC REV: CPT | Performed by: INTERNAL MEDICINE

## 2021-07-27 PROCEDURE — G8427 DOCREV CUR MEDS BY ELIG CLIN: HCPCS | Performed by: INTERNAL MEDICINE

## 2021-07-27 PROCEDURE — 1123F ACP DISCUSS/DSCN MKR DOCD: CPT | Performed by: INTERNAL MEDICINE

## 2021-07-27 RX ORDER — CARVEDILOL 3.12 MG/1
3.12 TABLET ORAL 2 TIMES DAILY WITH MEALS
Qty: 60 TABLET | Refills: 11 | Status: SHIPPED | OUTPATIENT
Start: 2021-07-27 | End: 2022-05-04 | Stop reason: SDUPTHER

## 2021-07-27 RX ORDER — AMLODIPINE BESYLATE 10 MG/1
10 TABLET ORAL DAILY
Qty: 90 TABLET | Refills: 3 | Status: SHIPPED | OUTPATIENT
Start: 2021-07-27 | End: 2022-07-21

## 2021-07-27 NOTE — PATIENT INSTRUCTIONS
1.  Recommend that you start coreg 3.125 mg twice a day for better blood pressure control  2. Continue to monitor BP at home  3. Recheck fasting cholesterol  4.  Follow up in a year

## 2021-09-16 ENCOUNTER — HOSPITAL ENCOUNTER (OUTPATIENT)
Age: 66
Setting detail: SPECIMEN
Discharge: HOME | End: 2021-09-16
Payer: MEDICARE

## 2021-09-16 DIAGNOSIS — N18.31 STAGE 3A CHRONIC KIDNEY DISEASE (HCC): ICD-10-CM

## 2021-09-16 LAB
ANION GAP SERPL CALCULATED.3IONS-SCNC: 13 MMOL/L (ref 3–16)
BUN BLDV-MCNC: 24 MG/DL (ref 7–20)
CALCIUM SERPL-MCNC: 9.4 MG/DL (ref 8.3–10.6)
CHLORIDE BLD-SCNC: 105 MMOL/L (ref 99–110)
CO2: 25 MMOL/L (ref 21–32)
CREAT SERPL-MCNC: 1.5 MG/DL (ref 0.8–1.3)
GFR AFRICAN AMERICAN: 56
GFR NON-AFRICAN AMERICAN: 47
GLUCOSE BLD-MCNC: 108 MG/DL (ref 70–99)
POTASSIUM SERPL-SCNC: 4.6 MMOL/L (ref 3.5–5.1)
SODIUM BLD-SCNC: 143 MMOL/L (ref 136–145)

## 2021-09-16 PROCEDURE — 36415 COLL VENOUS BLD VENIPUNCTURE: CPT

## 2021-09-16 PROCEDURE — 80048 BASIC METABOLIC PNL TOTAL CA: CPT

## 2021-09-28 ENCOUNTER — TELEPHONE (OUTPATIENT)
Dept: CARDIOLOGY CLINIC | Age: 66
End: 2021-09-28

## 2021-09-28 NOTE — TELEPHONE ENCOUNTER
Pt wanted to let SRJ know that 4 months ago his kidney function was 60% and as of yesterday the kidney function was 42%. Pt stated the only thing he has changed is he started taking Carvedilol 3.125 mg BID three months ago. Pt has stopped taking the Carvedilol because he is concerned it is affecting his kidneys. Is there an alternative that pt can take? Where should pt go from here?

## 2021-09-29 NOTE — TELEPHONE ENCOUNTER
I would let him know that carvedilol will not affect kidney function and I do not think that that is the culprit for the changed that he has noted. Also, let him know that kidney function can fluctuate and from reviewing his past numbers, I do not think he is too far off from where he was in the past.  Let him know that taking carvedilol can actually help his kidney function by controlling blood pressure better which typically is the issue with a decline in kidney function. I would recommend resuming the carvedilol at previous doses. Thank you.

## 2022-01-26 ENCOUNTER — HOSPITAL ENCOUNTER (OUTPATIENT)
Age: 67
Discharge: HOME | End: 2022-01-26
Payer: MEDICARE

## 2022-01-26 DIAGNOSIS — E78.2 MIXED HYPERLIPIDEMIA: ICD-10-CM

## 2022-01-26 DIAGNOSIS — N18.31 STAGE 3A CHRONIC KIDNEY DISEASE (HCC): ICD-10-CM

## 2022-01-26 LAB
ANION GAP SERPL CALCULATED.3IONS-SCNC: 14 MMOL/L (ref 3–16)
BUN BLDV-MCNC: 21 MG/DL (ref 7–20)
CALCIUM SERPL-MCNC: 9.2 MG/DL (ref 8.3–10.6)
CHLORIDE BLD-SCNC: 102 MMOL/L (ref 99–110)
CHOLESTEROL, FASTING: 97 MG/DL (ref 0–199)
CO2: 24 MMOL/L (ref 21–32)
CREAT SERPL-MCNC: 1.5 MG/DL (ref 0.8–1.3)
GFR AFRICAN AMERICAN: 56
GFR NON-AFRICAN AMERICAN: 47
GLUCOSE BLD-MCNC: 112 MG/DL (ref 70–99)
HDLC SERPL-MCNC: 37 MG/DL (ref 40–60)
LDL CHOLESTEROL CALCULATED: 44 MG/DL
POTASSIUM SERPL-SCNC: 4.5 MMOL/L (ref 3.5–5.1)
SODIUM BLD-SCNC: 140 MMOL/L (ref 136–145)
TRIGLYCERIDE, FASTING: 79 MG/DL (ref 0–150)
VLDLC SERPL CALC-MCNC: 16 MG/DL

## 2022-01-26 PROCEDURE — 80061 LIPID PANEL: CPT

## 2022-01-26 PROCEDURE — 80048 BASIC METABOLIC PNL TOTAL CA: CPT

## 2022-01-26 PROCEDURE — 36415 COLL VENOUS BLD VENIPUNCTURE: CPT

## 2022-01-27 ENCOUNTER — TELEPHONE (OUTPATIENT)
Dept: CARDIOLOGY CLINIC | Age: 67
End: 2022-01-27

## 2022-01-27 NOTE — TELEPHONE ENCOUNTER
----- Message from Addy Luna MD sent at 1/27/2022  8:17 AM EST -----  Let patient know their kidney/lipid test is stable/similar to prior values, continue current meds, no new orders or changes at this time. Thanks.

## 2022-01-27 NOTE — TELEPHONE ENCOUNTER
Created telephone encounter. Spoke with Karan Cevallos relayed message per [facility] regarding labs. Pt verbalized understanding.

## 2022-03-06 DIAGNOSIS — E78.2 MIXED HYPERLIPIDEMIA: ICD-10-CM

## 2022-03-07 RX ORDER — FENOFIBRATE 145 MG/1
TABLET, COATED ORAL
Qty: 90 TABLET | Refills: 0 | Status: SHIPPED | OUTPATIENT
Start: 2022-03-07 | End: 2022-06-13 | Stop reason: ALTCHOICE

## 2022-03-07 NOTE — TELEPHONE ENCOUNTER
Last OV with SRJ 7/27/2021  Last OV with NPDD 1/26/2021  Upcoming OV with SRJ 4/8/2022  Last Lipid 1/26/2022

## 2022-03-21 RX ORDER — SIMVASTATIN 20 MG
20 TABLET ORAL NIGHTLY
Qty: 90 TABLET | Refills: 0 | Status: SHIPPED | OUTPATIENT
Start: 2022-03-21 | End: 2022-04-08 | Stop reason: SDUPTHER

## 2022-04-08 ENCOUNTER — OFFICE VISIT (OUTPATIENT)
Dept: CARDIOLOGY CLINIC | Age: 67
End: 2022-04-08
Payer: MEDICARE

## 2022-04-08 VITALS
BODY MASS INDEX: 28.42 KG/M2 | SYSTOLIC BLOOD PRESSURE: 132 MMHG | DIASTOLIC BLOOD PRESSURE: 68 MMHG | HEIGHT: 71 IN | OXYGEN SATURATION: 97 % | HEART RATE: 53 BPM | WEIGHT: 203 LBS

## 2022-04-08 DIAGNOSIS — E78.2 MIXED HYPERLIPIDEMIA: ICD-10-CM

## 2022-04-08 DIAGNOSIS — I10 ESSENTIAL HYPERTENSION: ICD-10-CM

## 2022-04-08 DIAGNOSIS — I49.9 IRREGULAR HEART RATE: Primary | ICD-10-CM

## 2022-04-08 PROCEDURE — 99214 OFFICE O/P EST MOD 30 MIN: CPT | Performed by: INTERNAL MEDICINE

## 2022-04-08 PROCEDURE — 3017F COLORECTAL CA SCREEN DOC REV: CPT | Performed by: INTERNAL MEDICINE

## 2022-04-08 PROCEDURE — G8427 DOCREV CUR MEDS BY ELIG CLIN: HCPCS | Performed by: INTERNAL MEDICINE

## 2022-04-08 PROCEDURE — 1036F TOBACCO NON-USER: CPT | Performed by: INTERNAL MEDICINE

## 2022-04-08 PROCEDURE — 4040F PNEUMOC VAC/ADMIN/RCVD: CPT | Performed by: INTERNAL MEDICINE

## 2022-04-08 PROCEDURE — G8417 CALC BMI ABV UP PARAM F/U: HCPCS | Performed by: INTERNAL MEDICINE

## 2022-04-08 PROCEDURE — 1123F ACP DISCUSS/DSCN MKR DOCD: CPT | Performed by: INTERNAL MEDICINE

## 2022-04-08 PROCEDURE — 93000 ELECTROCARDIOGRAM COMPLETE: CPT | Performed by: INTERNAL MEDICINE

## 2022-04-08 RX ORDER — SIMVASTATIN 20 MG
20 TABLET ORAL NIGHTLY
Qty: 90 TABLET | Refills: 2 | Status: SHIPPED | OUTPATIENT
Start: 2022-04-08 | End: 2022-07-14

## 2022-04-08 NOTE — PROGRESS NOTES
[facility], [place], operated by [facility]   CARDIAC EVALUATION NOTE  [phone]      PCP:  Miguel BERNAL DO    Reason for Consultation/Chief Complaint: follow up for HTN and HLD    Subjective   History of Present Illness:  Nick Arshad is a 78 y.o. patient with a history of HTN, HLD and mesenteric vein thrombosis who presents to establish with cardiology. He moved from [place], [place] in 10/2016. He reports he started seeing Dr. Armand Combs at age 47 for about 10 years. He started seeing the cardiologist for screening due to his father's vascular and heart disease. He has had 3 stress tests with the cardiologist. He has since loss 30 lbs of weight since retiring. He has made several diet changes. He had mesenteric vein thrombosis in 02/2018. He was on Xarelto for a short period and has since stopped the Xarelto. At his OV on 12/09/19 he was started on Norvasc 2.5 mg daily. This was increased to twice a day on 9/16/2020. Then increased to 10 mg daily on 3/4/2021 in the ED. Last OV he denied chest pain, sob, palpitations, dizziness or syncope. He notes that Dr. Vijay Alicea is following him for his Xarelto management. He had been monitoring BP at home and it has been in 150's. Since last office visit he was started on coreg 3.125 BID for blood pressure management. Today he brings in a log of his blood pressures. He states he has not had it calibrated. He states he has made dietary changes to include more chicken and fish. Patient currently denies any weight gain, edema, palpitations, chest pain, shortness of breath, dizziness, and syncope. He is taking all medications as prescribed, tolerating well. Past Medical History:   has a past medical history of Hx of blood clots, Hyperlipidemia, Hypertension, PONV (postoperative nausea and vomiting), Symptomatic cholelithiasis, and Thrombosis. Surgical History:   has a past surgical history that includes Ankle surgery; Wrist surgery;  Tonsillectomy; shoulder surgery (Right, 2012); Colonoscopy (03/08/2017); and Cholecystectomy, laparoscopic (N/A, 8/13/2020). Social History:   reports that he has never smoked. He has never used smokeless tobacco. He reports current alcohol use. He reports that he does not use drugs. Family History:  family history includes Breast Cancer in his mother; Coronary Art Dis in his father; Diabetes in his sister. Home Medications:  Were reviewed and are listed in nursing record and/or below  Prior to Admission medications    Medication Sig Start Date End Date Taking? Authorizing Provider   simvastatin (ZOCOR) 20 MG tablet Take 1 tablet by mouth nightly 3/21/22  Yes JOLANTA Lugo CNP   fenofibrate (TRICOR) 145 MG tablet Take 1 tablet by mouth once daily 3/7/22  Yes JOLANTA Barker CNP   doxazosin (CARDURA) 2 MG tablet Take 1 tablet by mouth daily 1/31/22 4/8/22 Yes Kirby Ortega MD   amLODIPine (NORVASC) 10 MG tablet Take 1 tablet by mouth daily 7/27/21  Yes Daniel Arthur MD   carvedilol (COREG) 3.125 MG tablet Take 1 tablet by mouth 2 times daily (with meals) 7/27/21  Yes Daniel Arthur MD   rivaroxaban (XARELTO) 20 MG TABS tablet Take 20 mg by mouth daily (with breakfast)    Yes Historical Provider, MD   furosemide (LASIX) 20 MG tablet Take 1 tablet by mouth daily  Patient not taking: Reported on 4/8/2022 9/27/21   Kirby Ortega MD   mesalamine (DELZICOL) 400 MG CPDR delayed release capsule Take 2 capsules by mouth 3 times daily  Patient not taking: Reported on 7/27/2021 3/4/21 4/3/21  Claude Spearing, MD          Allergies:  Patient has no known allergies. Review of Systems:   A 14 point review of symptoms completed. Pertinent positives identified in the HPI, all other review of symptoms negative as below.       Objective   PHYSICAL EXAM:    Vitals:    07/27/21 1114   BP: (!) 160/80   Pulse: 56   SpO2: 98%    Weight: 203 lb (92.1 kg)         General Appearance:  Alert, cooperative, no distress, appears stated age   Neck: Supple, symmetrical, no carotid bruit or JVD   Lungs:   Clear to auscultation bilaterally, respirations unlabored   Chest Wall:  No deformity or tenderness   Heart:  Regular rate and rhythm, S1, S2 normal,  1/6 sys Murmur present, no rub or gallop   Abdomen:   Soft, non-tender, bowel sounds active all four quadrants,  no masses, no organomegaly   Extremities: Extremities normal, atraumatic, no cyanosis or edema   Pulses: 2+ and symmetric   Skin: Skin color, texture, turgor normal, no rashes or lesions   Pysch: Normal mood and affect   Neurologic: Normal gross motor and sensory exam.         Labs   CBC:   Lab Results   Component Value Date    WBC 3.9 03/30/2021    RBC 4.29 03/30/2021    HGB 11.7 03/30/2021    HCT 35.5 03/30/2021    MCV 82.6 03/30/2021    RDW 15.6 03/30/2021     03/30/2021     CMP:  Lab Results   Component Value Date     01/26/2022    K 4.5 01/26/2022    K 3.8 03/01/2021     01/26/2022    CO2 24 01/26/2022    BUN 21 01/26/2022    CREATININE 1.5 01/26/2022    GFRAA 56 01/26/2022    AGRATIO 1.8 03/30/2021    LABGLOM 47 01/26/2022    GLUCOSE 112 01/26/2022    PROT 6.7 03/30/2021    CALCIUM 9.2 01/26/2022    BILITOT 0.3 03/30/2021    ALKPHOS 41 03/30/2021    AST 19 03/30/2021    ALT 15 03/30/2021     PT/INR:  No results found for: PTINR  HgBA1c:  Lab Results   Component Value Date    LABA1C 6.6 04/12/2021     No results found for: CKTOTAL, CKMB, CKMBINDEX, TROPONINI  Lab Results   Component Value Date    CHOL 130 02/20/2020    CHOL 123 02/27/2018     Lab Results   Component Value Date    TRIG 74 03/01/2021    TRIG 112 02/20/2020    TRIG 194 (H) 02/27/2018     Lab Results   Component Value Date    HDL 37 (L) 01/26/2022    HDL 41 02/20/2020    HDL 33 (L) 02/27/2018     Lab Results   Component Value Date    LDLCALC 44 01/26/2022    LDLCALC 67 02/20/2020    LDLCALC 51 02/27/2018     Lab Results   Component Value Date    LABVLDL 16 01/26/2022    LABVLDL 22 02/20/2020 LABVLDL 39 2018     No results found for: CHOLHDLRATIO      Cardiac Data     Last EK20  SR HR 63     Today, sbrady     Echo: 2020  Summary   Normal left ventricle size, wall thickness, and systolic function with a   visually estimated ejection fraction of 55%. No regional wall motion abnormalities are seen. Normal left ventricular diastolic filling pressure. No significant valvular disease. Stress Test:    Cath:    Studies:       I have reviewed labs and imaging/xray/diagnostic testing in this note. Assessment      1. Irregular heart rate    2. Essential hypertension    3. Mixed hyperlipidemia              Plan   1. Blood Pressure Machine bring in to next office visit for machine check for accuracy. 2. Discussion of continuing statin medication simvastatin 20 mg daily as prescribed ~ review of medication regimen and labs, benefit of statin is reducing inflammation, plaque accumulation therefore preventing heart attack. 3. Speak with Dr. Parish Anthony in regards to the fenofibrate 145 mg tablet daily ~ can sometimes impact kidney function   4. Follow up in 2 months with NP for blood pressure re-check against home machine. Scribe's attestation: This note was scribed in the presence of Nicole Farley by Anna Jiménez RN       Thank you for allowing us to participate in the care of Anette Shell. Please call me with any questions [phone]. Tennis Line, MD, [address] [facility]   Interventional Cardiologist  [facility]  [phone] [facility]  [phone] [address] [place] [address]  2022 10:35 AM    I will address the patient's cardiac risk factors and adjusted pharmacologic treatment as needed. In addition, I have reinforced the need for patient directed risk factor modification. Tobacco use was discussed with the patient and educated on the negative effects and was asked not to use. All questions and concerns were addressed to the patient/family.  Alternatives to my treatment were discussed. I, Dr Soni Felix, personally performed the services described in this documentation, as scribed by the above signed scribe in my presence. It is both accurate and complete to my knowledge. I agree with the details independently gathered by the clinical support staff and the scribed note accurately describes my personal service to the patient.

## 2022-04-08 NOTE — PATIENT INSTRUCTIONS
1. Blood Pressure Machine bring in to next office visit for machine check for accuracy. 2. Discussion of continuing statin medication simvastatin 20 mg daily as prescribed ~ review of medication regimen and labs, benefit of statin is reducing inflammation, plaque accumulation therefore preventing heart attack. 3. Speak with Dr. Darlene Ruiz in regards to the fenofibrate 145 mg tablet daily ~ no correlation seen in the past with this medication therapy and kidney injury. 4. Follow up in 2 months with NP for blood pressure re-check.

## 2022-05-04 RX ORDER — CARVEDILOL 3.12 MG/1
3.12 TABLET ORAL 2 TIMES DAILY WITH MEALS
Qty: 180 TABLET | Refills: 11 | Status: SHIPPED | OUTPATIENT
Start: 2022-05-04

## 2022-06-02 ENCOUNTER — HOSPITAL ENCOUNTER (OUTPATIENT)
Age: 67
Discharge: HOME | End: 2022-06-02
Payer: MEDICARE

## 2022-06-02 DIAGNOSIS — N18.31 STAGE 3A CHRONIC KIDNEY DISEASE (HCC): ICD-10-CM

## 2022-06-02 LAB
ANION GAP SERPL CALCULATED.3IONS-SCNC: 14 MMOL/L (ref 3–16)
BUN BLDV-MCNC: 20 MG/DL (ref 7–20)
CALCIUM SERPL-MCNC: 9.5 MG/DL (ref 8.3–10.6)
CHLORIDE BLD-SCNC: 106 MMOL/L (ref 99–110)
CO2: 24 MMOL/L (ref 21–32)
CREAT SERPL-MCNC: 1.2 MG/DL (ref 0.8–1.3)
GFR AFRICAN AMERICAN: >60
GFR NON-AFRICAN AMERICAN: >60
GLUCOSE BLD-MCNC: 208 MG/DL (ref 70–99)
POTASSIUM SERPL-SCNC: 4.3 MMOL/L (ref 3.5–5.1)
SODIUM BLD-SCNC: 144 MMOL/L (ref 136–145)

## 2022-06-02 PROCEDURE — 36415 COLL VENOUS BLD VENIPUNCTURE: CPT

## 2022-06-02 PROCEDURE — 80048 BASIC METABOLIC PNL TOTAL CA: CPT

## 2022-06-13 ENCOUNTER — OFFICE VISIT (OUTPATIENT)
Dept: CARDIOLOGY CLINIC | Age: 67
End: 2022-06-13
Payer: MEDICARE

## 2022-06-13 VITALS
WEIGHT: 207.5 LBS | SYSTOLIC BLOOD PRESSURE: 144 MMHG | HEIGHT: 71 IN | HEART RATE: 58 BPM | BODY MASS INDEX: 29.05 KG/M2 | DIASTOLIC BLOOD PRESSURE: 70 MMHG | OXYGEN SATURATION: 97 %

## 2022-06-13 DIAGNOSIS — N18.31 STAGE 3A CHRONIC KIDNEY DISEASE (HCC): ICD-10-CM

## 2022-06-13 DIAGNOSIS — K55.069 MESENTERIC VEIN THROMBOSIS (HCC): ICD-10-CM

## 2022-06-13 DIAGNOSIS — E78.2 MIXED HYPERLIPIDEMIA: ICD-10-CM

## 2022-06-13 DIAGNOSIS — I10 ESSENTIAL HYPERTENSION: Primary | ICD-10-CM

## 2022-06-13 PROCEDURE — G8427 DOCREV CUR MEDS BY ELIG CLIN: HCPCS | Performed by: NURSE PRACTITIONER

## 2022-06-13 PROCEDURE — 3017F COLORECTAL CA SCREEN DOC REV: CPT | Performed by: NURSE PRACTITIONER

## 2022-06-13 PROCEDURE — 1123F ACP DISCUSS/DSCN MKR DOCD: CPT | Performed by: NURSE PRACTITIONER

## 2022-06-13 PROCEDURE — 1036F TOBACCO NON-USER: CPT | Performed by: NURSE PRACTITIONER

## 2022-06-13 PROCEDURE — 99214 OFFICE O/P EST MOD 30 MIN: CPT | Performed by: NURSE PRACTITIONER

## 2022-06-13 PROCEDURE — G8417 CALC BMI ABV UP PARAM F/U: HCPCS | Performed by: NURSE PRACTITIONER

## 2022-06-13 RX ORDER — OXYCODONE HYDROCHLORIDE AND ACETAMINOPHEN 5; 325 MG/1; MG/1
TABLET ORAL
COMMUNITY

## 2022-06-13 NOTE — PROGRESS NOTES
[facility]   Cardiac Evaluation    Primary Care Doctor:  Christie BERNAL DO    Chief Complaint   Patient presents with    Follow-up    Hyperlipidemia    Hypertension        Assessment:    1. Essential hypertension    2. Mixed hyperlipidemia    3. Stage 3a chronic kidney disease ([address]    4. Mesenteric vein thrombosis ([address]        Plan:   1. Stay off the fenofibrate  2. Agree with increase in the doxazosin (Cardura) to 4 mg daily  3. Continue the carvedilol 3.125 mg twice daily, simvastatin and amlodipine  4. Agree with the Lasix 20 mg daily for 10 days then as needed for swelling  5. No need for blood work at this time  6. Follow up with Dr. Amanda Engel or me in 6 months    Vitals:    06/13/22 1344 06/13/22 1349   BP: (!) 142/68 (!) 144/70   Pulse: 58    SpO2: 97%    Weight: 207 lb 8 oz (94.1 kg)    Height: 5' 11\" (1.803 m)        Primary Cardiologist: Dr. Daniel Arthur     History of Present Illness:   I had the pleasure of seeing Cam Olson (78 y.o.) in follow up for  HTN, HLD as well as CKD3 and hx of mesenteric vein thrombosis. He is doing well. Saw nephrology last week. Blood work reviewed and improved. Cardura increased from 2 mg to 4 mg daily. He stopped the fenofibrate ~ 6 months ago. Recent lipids from January 2022 reviewed and stable. His weight is up but anticipates weight loss with summer activities (yard work, swimming). Not able to play golf due to right arm/ elbow pain, needs [facility] surgery. 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 Just came from dermatologist, received treatment for skin cancer. He feels this is why BP is elevated today. However, BP at home also runs 102'L systolic. Got a new BP monitor recently. Cam Olson describes symptoms including fatigue, edema, arthritis pain but denies chest pain, dyspnea, palpitations, orthopnea, PND, early saiety, syncope.      NYHA:   II  ACC/ AHA Stage:    C    Past Medical History:   has a past medical history of Hx of blood clots, Hyperlipidemia, Hypertension, PONV (postoperative nausea and vomiting), Symptomatic cholelithiasis, and Thrombosis. Surgical History:   has a past surgical history that includes Ankle surgery; Wrist surgery; Tonsillectomy; shoulder surgery (Right, 2012); Colonoscopy (03/08/2017); and Cholecystectomy, laparoscopic (N/A, 8/13/2020). Social History:   reports that he has never smoked. He has never used smokeless tobacco. He reports current alcohol use. He reports that he does not use drugs. Family History:   Family History   Problem Relation Age of Onset    Breast Cancer Mother     Coronary Art Dis Father     Diabetes Sister        Home Medications:  Prior to Admission medications    Medication Sig Start Date End Date Taking? Authorizing Provider   carvedilol (COREG) 3.125 MG tablet Take 1 tablet by mouth 2 times daily (with meals) 5/4/22  Yes Angelina Bynum MD   simvastatin (ZOCOR) 20 MG tablet Take 1 tablet by mouth nightly 4/8/22  Yes Angelina Bynum MD   fenofibrate (TRICOR) 145 MG tablet Take 1 tablet by mouth once daily 3/7/22  Yes JOLANTA Rausch CNP   doxazosin (CARDURA) 2 MG tablet Take 1 tablet by mouth daily 1/31/22 6/13/22 Yes Papo Valerio MD   amLODIPine (NORVASC) 10 MG tablet Take 1 tablet by mouth daily 7/27/21  Yes Angelina Bynum MD   rivaroxaban (XARELTO) 20 MG TABS tablet Take 20 mg by mouth daily (with breakfast)    Yes Historical Provider, MD   furosemide (LASIX) 20 MG tablet Take 1 tablet by mouth daily  Patient not taking: Reported on 4/8/2022 9/27/21   Papo Valerio MD   mesalamine (DELZICOL) 400 MG CPDR delayed release capsule Take 2 capsules by mouth 3 times daily  Patient not taking: Reported on 7/27/2021 3/4/21 4/3/21  Anthony Naranjo MD        Allergies:  Patient has no known allergies.      Physical Examination:    Vitals:    06/13/22 1344 06/13/22 1349   BP: (!) 142/68 (!) 144/70   Pulse: 58    SpO2: 97%    Weight: 207 lb 8 oz (94.1 kg) BUN 21 01/26/2022    BUN 24 09/16/2021    CREATININE 1.2 06/02/2022    CREATININE 1.5 01/26/2022    CREATININE 1.5 09/16/2021     BNP: No results found for: PROBNP  LIPID:   Lab Results   Component Value Date    TRIG 74 03/01/2021    TRIG 112 02/20/2020    HDL 37 01/26/2022    HDL 41 02/20/2020    LDLCALC 44 01/26/2022    LDLCALC 67 02/20/2020       Cardiac Imaging:     ECHO 9/25/20:    Summary   Normal left ventricle size, wall thickness, and systolic function with a visually estimated ejection fraction of 55%. No regional wall motion abnormalities are seen. Normal left ventricular diastolic filling pressure. No significant valvular disease.            I appreciate the opportunity of cooperating in the care of this individual.    JOLANTA Kemp - CNP, 6/13/2022, 2:00 PM

## 2022-06-13 NOTE — PATIENT INSTRUCTIONS
1. Stay off the fenofibrate  2. Agree with increase in the doxazosin (Cardura) to 4 mg daily  3. Continue the carvedilol 3.125 mg twice daily, simvastatin and amlodipine  4. Agree with the Lasix 20 mg daily for 10 days then as needed for swelling  5. No need for blood work at this time  6.  Follow up with Dr. Chandler Chau or me in 6 months

## 2022-07-14 DIAGNOSIS — E78.2 MIXED HYPERLIPIDEMIA: ICD-10-CM

## 2022-07-14 RX ORDER — SIMVASTATIN 20 MG
20 TABLET ORAL NIGHTLY
Qty: 90 TABLET | Refills: 0 | Status: SHIPPED | OUTPATIENT
Start: 2022-07-14

## 2022-07-21 DIAGNOSIS — I10 ESSENTIAL HYPERTENSION: ICD-10-CM

## 2022-07-21 RX ORDER — AMLODIPINE BESYLATE 10 MG/1
TABLET ORAL
Qty: 90 TABLET | Refills: 3 | Status: SHIPPED | OUTPATIENT
Start: 2022-07-21

## 2022-08-26 ENCOUNTER — TELEPHONE (OUTPATIENT)
Dept: CARDIOLOGY CLINIC | Age: 67
End: 2022-08-26

## 2022-08-26 NOTE — TELEPHONE ENCOUNTER
Patient is at acceptable (intermediate) cv risk for planned procedure/surgery. Ok to hold xarelto 1wk prior, resume it within 1-2 days after surgery. Thank you.

## 2022-08-26 NOTE — TELEPHONE ENCOUNTER
CARDIAC CLEARANCE      What type of procedure are you having? Left Knee diagnostic arthroscopy and debridement     Which physician is performing your procedure? Dr. Navid Glynn MD     When is your procedure scheduled for? 9/2/22     Where are you having this procedure? [address] [place] [address]     Are you taking Blood Thinners/Anticoagulants? YES              If so what? Xarelto 20 mg daily                 Does the surgeon want you to stop your blood thinner? YES If so for how long?   1 week prior Needs to tops taking before surgery      Phone Number and Contact Name for Physicians office: [phone]     Fax number to send information: [phone]

## 2022-08-26 NOTE — LETTER
The [address] [place] [address], [address] [place] [address]  F:[phone]  F: [phone]      August 26, 2022    To whom it may concern:    Shy Castillo (1955) is at acceptable (intermediate) cardiovascular risk for planned procedure/surgery. It is okay to hold Xarelto 1 week prior to procedure/surgery and to resume it within 1-2 days after procedure/surgery. If you have any questions, please do not hesitate to contact the office.      Thank you,  Dr. Neeru Denton MD  Interventional Cardiology

## 2022-08-29 ENCOUNTER — TELEPHONE (OUTPATIENT)
Dept: CARDIOLOGY CLINIC | Age: 67
End: 2022-08-29

## 2022-08-29 NOTE — TELEPHONE ENCOUNTER
Not on Xarelto for cardiac diagnosis/ etiology. Our notes state he was on this previously for mesenteric vein thrombosis but was stopped in 2019. Most recently, notes state Dr. Akanksha Warner is managing this (hem/ onc).    Vladimir tSevens, JOLANTA - CNP

## 2022-08-29 NOTE — TELEPHONE ENCOUNTER
Nurse practioner Arline Peabody from [place] urgent [facility] stated that pt was in for hypertension. Koko wanted more info as to why pt is on Xarelto. Koko also wanted last ov notes and ekg. Last ov note and ekg were faxed to [phone].

## 2022-10-04 ENCOUNTER — HOSPITAL ENCOUNTER (OUTPATIENT)
Age: 67
Discharge: HOME | End: 2022-10-04
Payer: MEDICARE

## 2022-10-04 DIAGNOSIS — N18.31 STAGE 3A CHRONIC KIDNEY DISEASE (HCC): ICD-10-CM

## 2022-10-04 LAB
ANION GAP SERPL CALCULATED.3IONS-SCNC: 13 MMOL/L (ref 3–16)
BUN BLDV-MCNC: 22 MG/DL (ref 7–20)
CALCIUM SERPL-MCNC: 9.3 MG/DL (ref 8.3–10.6)
CHLORIDE BLD-SCNC: 102 MMOL/L (ref 99–110)
CO2: 27 MMOL/L (ref 21–32)
CREAT SERPL-MCNC: 1.2 MG/DL (ref 0.8–1.3)
GFR AFRICAN AMERICAN: >60
GFR NON-AFRICAN AMERICAN: >60
GLUCOSE BLD-MCNC: 239 MG/DL (ref 70–99)
POTASSIUM SERPL-SCNC: 4.9 MMOL/L (ref 3.5–5.1)
SODIUM BLD-SCNC: 142 MMOL/L (ref 136–145)

## 2022-10-04 PROCEDURE — 80048 BASIC METABOLIC PNL TOTAL CA: CPT

## 2022-10-04 PROCEDURE — 36415 COLL VENOUS BLD VENIPUNCTURE: CPT

## 2022-12-12 ENCOUNTER — OFFICE VISIT (OUTPATIENT)
Dept: CARDIOLOGY CLINIC | Age: 67
End: 2022-12-12
Payer: MEDICARE

## 2022-12-12 VITALS
HEART RATE: 64 BPM | DIASTOLIC BLOOD PRESSURE: 62 MMHG | WEIGHT: 195.5 LBS | BODY MASS INDEX: 27.37 KG/M2 | SYSTOLIC BLOOD PRESSURE: 132 MMHG | OXYGEN SATURATION: 98 % | HEIGHT: 71 IN

## 2022-12-12 DIAGNOSIS — N18.31 STAGE 3A CHRONIC KIDNEY DISEASE (HCC): ICD-10-CM

## 2022-12-12 DIAGNOSIS — E78.2 MIXED HYPERLIPIDEMIA: ICD-10-CM

## 2022-12-12 DIAGNOSIS — K55.069 MESENTERIC VEIN THROMBOSIS (HCC): ICD-10-CM

## 2022-12-12 DIAGNOSIS — I10 ESSENTIAL HYPERTENSION: Primary | ICD-10-CM

## 2022-12-12 DIAGNOSIS — I13.0 HYPERTENSIVE HEART AND CHRONIC KIDNEY DISEASE WITH HEART FAILURE AND STAGE 1 THROUGH STAGE 4 CHRONIC KIDNEY DISEASE, OR UNSPECIFIED CHRONIC KIDNEY DISEASE (HCC): ICD-10-CM

## 2022-12-12 DIAGNOSIS — E11.9 TYPE 2 DIABETES MELLITUS WITHOUT COMPLICATION, WITHOUT LONG-TERM CURRENT USE OF INSULIN (HCC): ICD-10-CM

## 2022-12-12 DIAGNOSIS — R06.09 DYSPNEA ON EXERTION: ICD-10-CM

## 2022-12-12 PROCEDURE — G8484 FLU IMMUNIZE NO ADMIN: HCPCS | Performed by: NURSE PRACTITIONER

## 2022-12-12 PROCEDURE — G8427 DOCREV CUR MEDS BY ELIG CLIN: HCPCS | Performed by: NURSE PRACTITIONER

## 2022-12-12 PROCEDURE — 3017F COLORECTAL CA SCREEN DOC REV: CPT | Performed by: NURSE PRACTITIONER

## 2022-12-12 PROCEDURE — 1036F TOBACCO NON-USER: CPT | Performed by: NURSE PRACTITIONER

## 2022-12-12 PROCEDURE — G8417 CALC BMI ABV UP PARAM F/U: HCPCS | Performed by: NURSE PRACTITIONER

## 2022-12-12 PROCEDURE — 1123F ACP DISCUSS/DSCN MKR DOCD: CPT | Performed by: NURSE PRACTITIONER

## 2022-12-12 PROCEDURE — 2022F DILAT RTA XM EVC RTNOPTHY: CPT | Performed by: NURSE PRACTITIONER

## 2022-12-12 PROCEDURE — 3046F HEMOGLOBIN A1C LEVEL >9.0%: CPT | Performed by: NURSE PRACTITIONER

## 2022-12-12 PROCEDURE — 3074F SYST BP LT 130 MM HG: CPT | Performed by: NURSE PRACTITIONER

## 2022-12-12 PROCEDURE — 3078F DIAST BP <80 MM HG: CPT | Performed by: NURSE PRACTITIONER

## 2022-12-12 PROCEDURE — 99214 OFFICE O/P EST MOD 30 MIN: CPT | Performed by: NURSE PRACTITIONER

## 2022-12-12 RX ORDER — ATORVASTATIN CALCIUM 40 MG/1
40 TABLET, FILM COATED ORAL NIGHTLY
COMMUNITY
Start: 2022-10-17

## 2022-12-12 NOTE — PATIENT INSTRUCTIONS
Exercise nuclear stress test and echocardiogram for dizziness, shortness of breath with walking up incline  Hold the carvedilol (Coreg) for 24 hours prior stress test  Call [facility] ([phone]) to schedule testing   No change in current medications  Follow up with Dr. Dominique Rey in 6 months (or sooner depending on results of testing)

## 2022-12-12 NOTE — PROGRESS NOTES
[facility]   Cardiac Evaluation    Primary Care Doctor:  Sourav Moreno MD    Chief Complaint   Patient presents with    6 Month Follow-Up    Hypertension    Hyperlipidemia    Dizziness     When walking up hills or shorter walks with dogs         Assessment:    1. Essential hypertension    2. Mixed hyperlipidemia    3. Stage 3a chronic kidney disease ([address]    4. Mesenteric vein thrombosis (HCC)        Plan:   Exercise nuclear stress test and echocardiogram for dizziness, shortness of breath with walking up incline  Hold the carvedilol (Coreg) for 24 hours prior stress test  call [phone]) to schedule   No change in current medications  Follow up with Dr. Mauro Mail in 6 months (or sooner depending on results of testing)     Vitals:    12/12/22 1307   BP: 132/62   Site: Right Upper Arm   Position: Sitting   Cuff Size: Medium Adult   Pulse: 64   SpO2: 98%   Weight: 195 lb 8 oz (88.7 kg)   Height: 5' 11\" (1.803 m)       Primary Cardiologist: Dr. Tori Barcenas     History of Present Illness:   I had the pleasure of seeing Robert Singh (75 y.o.) in follow up for  HTN, HLD as well as CKD3 and hx of mesenteric vein thrombosis X2 (on lifelong anticoagulation per hem/onc- Xarelto). He was started on lisinopril 10 mg per nephrology, doxazosin stopped. He is having lightheadedness. BP has been stable at 130/ 70's. He also has been diagnosed with diabetes. Was tried on metformin, did not tolerate due to diarrhea. Prescribed Otilio Ding but not starting till January due to cost (currently in coverage gap). Now seeing new PCP at [facility]. Getting vaccines. Simvastatin changed to atorvastatin. He has lost 12 lbs over past 2 months. Plans to lose 5-7 lbs more. Tore his MCL left knee 2-3 months ago - had surgery, no complications. Still doing physical therapy. Walks dogs. Does okay if on flat surface but has dizziness with walking up incline, associated with dyspnea and palpitations.   He will check his BP after these episodes, HR and BP are okay. He previously had stress tests every 2-3 years in [place] (Dr. Rose Mcdaniel), but has been > 6 years since last test.     Dilan Coates describes symptoms including dyspnea, palpitations, dizziness but denies chest pain, palpitations, orthopnea, PND, early saiety, edema, syncope. NYHA:   II  ACC/ AHA Stage:    C    Past Medical History:   has a past medical history of Hx of blood clots, Hyperlipidemia, Hypertension, PONV (postoperative nausea and vomiting), Symptomatic cholelithiasis, and Thrombosis. Surgical History:   has a past surgical history that includes Ankle surgery; Wrist surgery; Tonsillectomy; shoulder surgery (Right, 2012); Colonoscopy (03/08/2017); and Cholecystectomy, laparoscopic (N/A, 8/13/2020). Social History:   reports that he has never smoked. He has never used smokeless tobacco. He reports current alcohol use. He reports that he does not use drugs. Family History:   Family History   Problem Relation Age of Onset    Breast Cancer Mother     Coronary Art Dis Father     Diabetes Sister        Home Medications:  Prior to Admission medications    Medication Sig Start Date End Date Taking?  Authorizing Provider   dapagliflozin (FARXIGA) 5 MG tablet Take 5 mg by mouth every morning Starting in 2023 11/14/22  Yes Historical Provider, MD   atorvastatin (LIPITOR) 40 MG tablet Take 40 mg by mouth nightly 10/17/22  Yes Historical Provider, MD   lisinopril (PRINIVIL;ZESTRIL) 10 MG tablet Take 1 tablet by mouth daily 10/10/22  Yes Alcus Eisenmenger, MD   amLODIPine (NORVASC) 10 MG tablet Take 1 tablet by mouth once daily 7/21/22  Yes Virginia Lafleur MD   carvedilol (COREG) 3.125 MG tablet Take 1 tablet by mouth 2 times daily (with meals) 5/4/22  Yes Virginia Lafleur MD   rivaroxaban (XARELTO) 20 MG TABS tablet Take 20 mg by mouth daily (with breakfast)    Yes Historical Provider, MD   simvastatin (ZOCOR) 20 MG tablet Take 1 tablet by mouth nightly  Patient not taking: Reported on 12/12/2022 7/14/22   Radha Arzate MD   oxyCODONE-acetaminophen (PERCOCET) 5-325 MG per tablet Oxycodone-Acetaminophen Oral 5 mg-325 mg        active  Patient not taking: Reported on 12/12/2022    Historical Provider, MD   furosemide (LASIX) 20 MG tablet Take 1 tablet by mouth daily  Patient not taking: No sig reported 9/27/21   Izabel Tolliver MD        Allergies:  Patient has no known allergies. Physical Examination:    Vitals:    12/12/22 1307   BP: 132/62   Site: Right Upper Arm   Position: Sitting   Cuff Size: Medium Adult   Pulse: 64   SpO2: 98%   Weight: 195 lb 8 oz (88.7 kg)   Height: 5' 11\" (1.803 m)     Constitutional and General Appearance: Warm and dry, no apparent distress, normal coloration  HEENT:  Normocephalic, atraumatic  Respiratory:  Normal excursion and expansion without use of accessory muscles  Resp Auscultation: Clear to auscultation   Cardiovascular: The apical impulses not displaced  Heart tones are crisp and normal  JVP 8 cm H2O  Regular rate and rhythm, normal S1S2, no m/g/r  Peripheral pulses are symmetrical and full  There is no clubbing, cyanosis of the extremities.   No BLE edema  Pedal Pulses: 2+ and equal   Abdomen:  No masses or tenderness  Liver/Spleen: No Abnormalities Noted  Neurological/Psychiatric:  Alert and oriented in all spheres  Moves all extremities well  Exhibits normal gait balance and coordination  No abnormalities of mood, affect, memory, mentation, or behavior are noted    Lab Data:  Most recent lab results below reviewed in office    CBC:   Lab Results   Component Value Date/Time    WBC 3.9 03/30/2021 01:45 PM    WBC 3.9 03/22/2021 04:15 PM    WBC 3.8 03/16/2021 03:30 PM    RBC 4.29 03/30/2021 01:45 PM    RBC 3.89 03/22/2021 04:15 PM    RBC 4.12 03/16/2021 03:30 PM    HGB 11.7 03/30/2021 01:45 PM    HGB 10.6 03/22/2021 04:15 PM    HGB 11.2 03/16/2021 03:30 PM    HCT 35.5 03/30/2021 01:45 PM    HCT 32.1 03/22/2021 04:15 PM    HCT 33.9 03/16/2021 03:30 PM    MCV 82.6 03/30/2021 01:45 PM    MCV 82.3 03/22/2021 04:15 PM    MCV 82.2 03/16/2021 03:30 PM    RDW 15.6 03/30/2021 01:45 PM    RDW 15.7 03/22/2021 04:15 PM    RDW 14.6 03/16/2021 03:30 PM     03/30/2021 01:45 PM     03/22/2021 04:15 PM     03/16/2021 03:30 PM     BMP:  Lab Results   Component Value Date/Time     10/04/2022 01:38 PM     06/02/2022 01:38 PM     01/26/2022 10:51 AM    K 4.9 10/04/2022 01:38 PM    K 4.3 06/02/2022 01:38 PM    K 4.5 01/26/2022 10:51 AM    K 3.8 03/01/2021 11:33 AM    K 4.0 02/26/2021 05:36 AM    K 4.3 02/25/2021 05:30 AM     10/04/2022 01:38 PM     06/02/2022 01:38 PM     01/26/2022 10:51 AM    CO2 27 10/04/2022 01:38 PM    CO2 24 06/02/2022 01:38 PM    CO2 24 01/26/2022 10:51 AM    PHOS 3.4 05/06/2021 09:47 AM    PHOS 3.3 03/04/2021 05:15 AM    PHOS 2.7 03/03/2021 06:56 AM    BUN 22 10/04/2022 01:38 PM    BUN 20 06/02/2022 01:38 PM    BUN 21 01/26/2022 10:51 AM    CREATININE 1.2 10/04/2022 01:38 PM    CREATININE 1.2 06/02/2022 01:38 PM    CREATININE 1.5 01/26/2022 10:51 AM     BNP: No results found for: PROBNP  LIPID:   Lab Results   Component Value Date/Time    TRIG 74 03/01/2021 11:33 AM    TRIG 112 02/20/2020 10:45 AM    HDL 37 01/26/2022 10:51 AM    HDL 41 02/20/2020 10:45 AM    LDLCALC 44 01/26/2022 10:51 AM    LDLCALC 67 02/20/2020 10:45 AM       Cardiac Imaging:     ECHO 9/25/20:    Summary   Normal left ventricle size, wall thickness, and systolic function with a visually estimated ejection fraction of 55%. No regional wall motion abnormalities are seen. Normal left ventricular diastolic filling pressure. No significant valvular disease.          I appreciate the opportunity of cooperating in the care of this individual.    Trinity Castellano, APRN - CNP, 12/12/2022, 1:24 PM

## 2023-01-26 ENCOUNTER — HOSPITAL ENCOUNTER (OUTPATIENT)
Dept: CARDIOLOGY | Age: 68
Discharge: HOME | End: 2023-01-26
Payer: MEDICARE

## 2023-01-26 DIAGNOSIS — I10 ESSENTIAL HYPERTENSION: ICD-10-CM

## 2023-01-26 DIAGNOSIS — N18.31 STAGE 3A CHRONIC KIDNEY DISEASE (HCC): ICD-10-CM

## 2023-01-26 DIAGNOSIS — K55.069 MESENTERIC VEIN THROMBOSIS (HCC): ICD-10-CM

## 2023-01-26 DIAGNOSIS — I13.0 HYPERTENSIVE HEART AND CHRONIC KIDNEY DISEASE WITH HEART FAILURE AND STAGE 1 THROUGH STAGE 4 CHRONIC KIDNEY DISEASE, OR UNSPECIFIED CHRONIC KIDNEY DISEASE (HCC): ICD-10-CM

## 2023-01-26 DIAGNOSIS — E78.2 MIXED HYPERLIPIDEMIA: ICD-10-CM

## 2023-01-26 DIAGNOSIS — R06.09 DYSPNEA ON EXERTION: ICD-10-CM

## 2023-01-26 LAB
LV EF: 52 %
LV EF: 55 %
LVEF MODALITY: NORMAL
LVEF MODALITY: NORMAL

## 2023-01-26 PROCEDURE — 3430000000 HC RX DIAGNOSTIC RADIOPHARMACEUTICAL: Performed by: NURSE PRACTITIONER

## 2023-01-26 PROCEDURE — 93017 CV STRESS TEST TRACING ONLY: CPT

## 2023-01-26 PROCEDURE — 78452 HT MUSCLE IMAGE SPECT MULT: CPT

## 2023-01-26 PROCEDURE — 93306 TTE W/DOPPLER COMPLETE: CPT

## 2023-01-26 PROCEDURE — A9502 TC99M TETROFOSMIN: HCPCS | Performed by: NURSE PRACTITIONER

## 2023-01-26 RX ADMIN — TETROFOSMIN 33.7 MILLICURIE: 1.38 INJECTION, POWDER, LYOPHILIZED, FOR SOLUTION INTRAVENOUS at 11:50

## 2023-01-26 RX ADMIN — TETROFOSMIN 10.6 MILLICURIE: 1.38 INJECTION, POWDER, LYOPHILIZED, FOR SOLUTION INTRAVENOUS at 09:48

## 2023-01-30 ENCOUNTER — TELEPHONE (OUTPATIENT)
Dept: CARDIOLOGY CLINIC | Age: 68
End: 2023-01-30

## 2023-01-30 NOTE — TELEPHONE ENCOUNTER
Spoke with patient. Patient is scheduled with Dr. Ivelisse Schwartz for Left Heart Cath on 2/8/23 at [facility], arrival time of 6:30am (2 hrs extra fluids) to the Cath Lab. Please have patient arrive to the main entrance of [facility] and check in with the registration desk. Please call patient regarding medication instructions. Remind patient to be NPO after midnight (8 hours prior). Do not apply lotions/creams on skin the day of procedure.

## 2023-01-30 NOTE — TELEPHONE ENCOUNTER
LM for patient to call back.  He will hold the Xarelto 48 hours before the procedure.  He will hold the furosemide (lasix) the morning of the procedure.  Hold any vitamins and supplements.  May take all other medications with sips of water.

## 2023-01-30 NOTE — TELEPHONE ENCOUNTER
----- Message from Ren Prather MD sent at 1/28/2023  1:34 PM EST -----  Lets have pt come in 2hrs early for one liter ns bolus followed by fluids at 200cc/hr. Thank you. Barak Sousa    ----- Message -----  From: Ernie Gonzalez  Sent: 1/27/2023   5:06 PM EST  To: JOLANTA Suarez CNP, #    Thanks Carlotta Cardenas. SRJ, can you please let me know what you would like for fluids? Thank you! Tyra    ----- Message -----  From: JOLANTA Suarez CNP  Sent: 1/27/2023   2:44 PM EST  To: Ernie Gonzalez    Reviewed results with Dr. Nina Leung and patient. Recommend a [facility] to further evaluate abnormal stress test.  Patient is agreeable. Tyra, can you help arrange? Of note, he has mild CKD which has been affected by contrast in past. Let's arrange to come in couple hours early for IV fluids.   Thank you, Carlotta Cardenas

## 2023-02-08 ENCOUNTER — HOSPITAL ENCOUNTER (OUTPATIENT)
Dept: CARDIAC CATH/INVASIVE PROCEDURES | Age: 68
Discharge: HOME | End: 2023-02-08
Attending: INTERNAL MEDICINE | Admitting: INTERNAL MEDICINE
Payer: MEDICARE

## 2023-02-08 VITALS — HEIGHT: 71 IN | WEIGHT: 192.4 LBS | BODY MASS INDEX: 26.94 KG/M2

## 2023-02-08 LAB
ANION GAP SERPL CALCULATED.3IONS-SCNC: 13 MMOL/L (ref 3–16)
BUN BLDV-MCNC: 23 MG/DL (ref 7–20)
CALCIUM SERPL-MCNC: 9.4 MG/DL (ref 8.3–10.6)
CHLORIDE BLD-SCNC: 104 MMOL/L (ref 99–110)
CHOLESTEROL, TOTAL: 91 MG/DL (ref 0–199)
CO2: 23 MMOL/L (ref 21–32)
CREAT SERPL-MCNC: 1.1 MG/DL (ref 0.8–1.3)
EKG ATRIAL RATE: 52 BPM
EKG DIAGNOSIS: NORMAL
EKG P AXIS: 49 DEGREES
EKG P-R INTERVAL: 140 MS
EKG Q-T INTERVAL: 460 MS
EKG QRS DURATION: 84 MS
EKG QTC CALCULATION (BAZETT): 427 MS
EKG R AXIS: 55 DEGREES
EKG T AXIS: 68 DEGREES
EKG VENTRICULAR RATE: 52 BPM
GFR SERPL CREATININE-BSD FRML MDRD: >60 ML/MIN/{1.73_M2}
GLUCOSE BLD-MCNC: 143 MG/DL (ref 70–99)
HCT VFR BLD CALC: 46 % (ref 40.5–52.5)
HDLC SERPL-MCNC: 39 MG/DL (ref 40–60)
HEMOGLOBIN: 15 G/DL (ref 13.5–17.5)
LDL CHOLESTEROL CALCULATED: 33 MG/DL
LV EF: 40 %
LVEF MODALITY: NORMAL
MCH RBC QN AUTO: 28.1 PG (ref 26–34)
MCHC RBC AUTO-ENTMCNC: 32.6 G/DL (ref 31–36)
MCV RBC AUTO: 86.2 FL (ref 80–100)
PDW BLD-RTO: 14 % (ref 12.4–15.4)
PLATELET # BLD: 170 K/UL (ref 135–450)
PMV BLD AUTO: 7.9 FL (ref 5–10.5)
POTASSIUM REFLEX MAGNESIUM: 4 MMOL/L (ref 3.5–5.1)
RBC # BLD: 5.33 M/UL (ref 4.2–5.9)
SODIUM BLD-SCNC: 140 MMOL/L (ref 136–145)
TRIGL SERPL-MCNC: 97 MG/DL (ref 0–150)
VLDLC SERPL CALC-MCNC: 19 MG/DL
WBC # BLD: 7 K/UL (ref 4–11)

## 2023-02-08 PROCEDURE — 93459 L HRT ART/GRFT ANGIO: CPT

## 2023-02-08 PROCEDURE — 6360000002 HC RX W HCPCS

## 2023-02-08 PROCEDURE — 85027 COMPLETE CBC AUTOMATED: CPT

## 2023-02-08 PROCEDURE — 2709999900 HC NON-CHARGEABLE SUPPLY

## 2023-02-08 PROCEDURE — 99152 MOD SED SAME PHYS/QHP 5/>YRS: CPT | Performed by: INTERNAL MEDICINE

## 2023-02-08 PROCEDURE — 2580000003 HC RX 258

## 2023-02-08 PROCEDURE — 93005 ELECTROCARDIOGRAM TRACING: CPT

## 2023-02-08 PROCEDURE — 6370000000 HC RX 637 (ALT 250 FOR IP)

## 2023-02-08 PROCEDURE — 80048 BASIC METABOLIC PNL TOTAL CA: CPT

## 2023-02-08 PROCEDURE — 2500000003 HC RX 250 WO HCPCS

## 2023-02-08 PROCEDURE — 93459 L HRT ART/GRFT ANGIO: CPT | Performed by: INTERNAL MEDICINE

## 2023-02-08 PROCEDURE — 80061 LIPID PANEL: CPT

## 2023-02-08 PROCEDURE — C1769 GUIDE WIRE: HCPCS

## 2023-02-08 PROCEDURE — C1894 INTRO/SHEATH, NON-LASER: HCPCS

## 2023-02-08 PROCEDURE — 85347 COAGULATION TIME ACTIVATED: CPT

## 2023-02-08 RX ORDER — SODIUM CHLORIDE 0.9 % (FLUSH) 0.9 %
5-40 SYRINGE (ML) INJECTION EVERY 12 HOURS SCHEDULED
Status: DISCONTINUED | OUTPATIENT
Start: 2023-02-08 | End: 2023-02-08 | Stop reason: HOSPADM

## 2023-02-08 RX ORDER — SODIUM CHLORIDE 9 MG/ML
INJECTION, SOLUTION INTRAVENOUS CONTINUOUS
Status: DISCONTINUED | OUTPATIENT
Start: 2023-02-08 | End: 2023-02-08 | Stop reason: HOSPADM

## 2023-02-08 RX ORDER — ACETAMINOPHEN 325 MG/1
650 TABLET ORAL EVERY 4 HOURS PRN
Status: DISCONTINUED | OUTPATIENT
Start: 2023-02-08 | End: 2023-02-08 | Stop reason: HOSPADM

## 2023-02-08 RX ORDER — ONDANSETRON 2 MG/ML
4 INJECTION INTRAMUSCULAR; INTRAVENOUS EVERY 6 HOURS PRN
Status: DISCONTINUED | OUTPATIENT
Start: 2023-02-08 | End: 2023-02-08 | Stop reason: HOSPADM

## 2023-02-08 RX ORDER — SODIUM CHLORIDE 0.9 % (FLUSH) 0.9 %
5-40 SYRINGE (ML) INJECTION PRN
Status: DISCONTINUED | OUTPATIENT
Start: 2023-02-08 | End: 2023-02-08 | Stop reason: HOSPADM

## 2023-02-08 RX ORDER — 0.9 % SODIUM CHLORIDE 0.9 %
1000 INTRAVENOUS SOLUTION INTRAVENOUS ONCE
Status: DISCONTINUED | OUTPATIENT
Start: 2023-02-08 | End: 2023-02-08 | Stop reason: HOSPADM

## 2023-02-08 RX ORDER — FENTANYL CITRATE 50 UG/ML
INJECTION, SOLUTION INTRAMUSCULAR; INTRAVENOUS
Status: COMPLETED | OUTPATIENT
Start: 2023-02-08 | End: 2023-02-08

## 2023-02-08 RX ORDER — MIDAZOLAM HYDROCHLORIDE 1 MG/ML
INJECTION INTRAMUSCULAR; INTRAVENOUS
Status: COMPLETED | OUTPATIENT
Start: 2023-02-08 | End: 2023-02-08

## 2023-02-08 RX ORDER — HEPARIN SODIUM 1000 [USP'U]/ML
INJECTION, SOLUTION INTRAVENOUS; SUBCUTANEOUS
Status: COMPLETED | OUTPATIENT
Start: 2023-02-08 | End: 2023-02-08

## 2023-02-08 RX ORDER — SODIUM CHLORIDE 9 MG/ML
INJECTION, SOLUTION INTRAVENOUS PRN
Status: DISCONTINUED | OUTPATIENT
Start: 2023-02-08 | End: 2023-02-08 | Stop reason: HOSPADM

## 2023-02-08 RX ORDER — ASPIRIN 325 MG
325 TABLET ORAL ONCE
Status: COMPLETED | OUTPATIENT
Start: 2023-02-08 | End: 2023-02-08

## 2023-02-08 RX ORDER — LORAZEPAM 0.5 MG/1
0.5 TABLET ORAL
Status: DISCONTINUED | OUTPATIENT
Start: 2023-02-08 | End: 2023-02-08 | Stop reason: HOSPADM

## 2023-02-08 RX ADMIN — Medication 325 MG: at 07:21

## 2023-02-08 RX ADMIN — HEPARIN SODIUM 5000 UNITS: 1000 INJECTION, SOLUTION INTRAVENOUS; SUBCUTANEOUS at 09:37

## 2023-02-08 RX ADMIN — SODIUM CHLORIDE 1000 ML: 9 INJECTION, SOLUTION INTRAVENOUS at 08:32

## 2023-02-08 RX ADMIN — MIDAZOLAM HYDROCHLORIDE 2 MG: 1 INJECTION INTRAMUSCULAR; INTRAVENOUS at 09:34

## 2023-02-08 RX ADMIN — FENTANYL CITRATE 25 MCG: 50 INJECTION, SOLUTION INTRAMUSCULAR; INTRAVENOUS at 09:35

## 2023-02-08 RX ADMIN — SODIUM CHLORIDE 1000 ML: 9 INJECTION, SOLUTION INTRAVENOUS at 07:07

## 2023-02-08 NOTE — DISCHARGE INSTRUCTIONS
FOLLOW-UP APPOINTMENTS    Follow-up appointment on 3/17/2023 at [phone] with Caroline Cunningham CNP. [address]: [phone]. If you are unable to make this appointment, please call to reschedule [phone]. Please arrive 15 minutes early to complete necessary paperwork. Directions to [place] [address] towards [place]. [address] [facility] exit. Right off exit. Cross over [employer]. Right on [address]. Left into hospital. Follow the signs to the emergency room ( turn left toward the Emergency room). Go right at the first stop sign. Just past the Emergency room at the second stop sign turn right and go up the ramp and park on the top level if possible. Go in the glass doors of the [facility] we on the top level of the garage [address]. As soon as you get in the door turn left and our office is the one with the glass doors. Cath Labs at  [facility]   Discharge Instructions        2/8/2023  Chin Cassidy   Date of Birth 1955       Activity:  No driving for 24 hours. In 24 hours you may remove dressing and shower, wash site gently with soap and water and leave open to air  Avoid submerging your arm in sitting water for 5 days. Do not use your {left/right:305852} hand for 24 hours, then  No lifting more than 5 pounds for 5 days. No lotions, powders, or ointments near site for 5 days. No work/school for 5 days unless instructed otherwise by your cardiologist.    Diet:   Resume previous diet, if a cardiac diet is specified you will receive a handout with  general guidelines. Drink extra non-alcoholic/decaffienated fluids for first 24 hours after your procedure.     Arm Management:  If bleeding occurs from the site or a hematoma (lump) begins to increase in size, apply pressure directly over the site, call 911 to return to the hospital.    Special Instructions:  Report any coolness or numbness in the arm  Report any chills, fever, itching, red bumps or rash   Report any of the following to the MD: drainage from the site, redness and/or swelling at the site, increased tenderness at the site   If you are currently taking Metformin or Metformin combination medications for Diabetes, hold your dose for 48 hours after your procedure. Consult your Cardiologist before taking any NSAIDS, vitamin supplements, estrogen, or estrogen plus progestin. Do not stop taking Plavix, Brilinta or Effient, without first consulting your cardiologist.    Sedation Discharge Instructions: For the next 24 hours do not drive a car, operate machinery, power tools or kitchen appliances. Do not drink alcohol; including beer or wine. Do not make any important decisions or sign any important papers. For the next 24 hours you can expect drowsiness, light-headed or dizziness, nausea/ vomiting, inability to concentrate, fatigue and desire to sleep. We strongly suggest that a responsible adult be with for the next 24 hours, for your protection and safety. You are not allowed to drive yourself home, or take any type of public transportation. Cardiac Rehab: If your doctor recommends Cardiac rehab, you will receive a call from that department to schedule your 1st appointment. Your Care Instructions  There are many things that cause chest pain. Some are not serious and will get better on their own in a few days. But some kinds of chest pain need more testing and treatment. Your doctor may have recommended follow-up visit in the next 4 to 8 weeks. If you are not feeling better, you may need more tests or treatment. Even though your doctor has released you, you still need to watch for any problems. The doctor carefully checked you, but sometimes problems can develop later. If you have new symptoms or if your symptoms do not improve, get medical help right away.  If you have worse or different chest pain or pressure that lasts more than 5 minutes or you passed out (lost consciouness), call 911 or seek other emergency help right away. A medical visit is only one step in your treatment. Even if you feel better, you still need to do what your doctor recommends, such as going to all suggested follow-up appointments and taking medications exactly as directed. This will help you recover and help prevent future problems. How can you care for yourself at home? Rest until you feel better. Take your medicine exactly as prescribed. Call your doctor if you think you are having problems with your medicine. Do not drive after taking a prescription pain medication. When should you call for help? Call 911 if: You passed out (lost consciousness). You have severe difficulty breathing. You have symptoms of a heart attack. These may include:  Chest pain or pressure, or a strange feeling in your chest.  Sweating. Shortness of breath. Nausea or vomiting. Pain, pressure, or a strange feeling in your back, neck or jaw, or upper belly or in one or both shoulders or arms. Lightheadedness or sudden weakness. A fast or irregular heartbeat. After you call 911, the  may tell you to chew 1 adult-strength or 2 to 4                  low-dose aspirin. Wait for an ambulance. Do not try to drive yourself. Call your doctor today if :  You have any trouble breathing. Your chest pain gets worse. You are dizzy or lightheaded, or you feel like you may faint. You are not getting better as expected. You are having new or different chest pain.

## 2023-02-08 NOTE — PROCEDURES
CARDIAC CATHETERIZATION REPORT     Procedure Date:  2023  Patient Name: Georgia Baum  MRN: [id number] : 1955      INDICATION   Abnormal stress test    PROCEDURES PERFORMED     Left heart catheterization  LVgram  Coronary angiogam  Coronary cath  LIMA angiogram  Monitoring of moderate conscious sedation        PROCEDURE DESCRIPTION   Risks/benefits/alternatives/outcomes were discussed with patient and/or family and informed consent was obtained. Using the [facility] scale, the patient's right radial artery was found to be a level B. Patient was prepped draped in the usual sterile fashion. Local anaesthetic was applied over puncture site. Using a back wall technique, a 6 [place] sheath was inserted into right radial artery. Verapamil, nitroglycerin were administered through the sheath. Heparin was administered. Diagnostic 5 Frisian pigtail, ultra catheters were used for diagnostic angiograms. At the conclusion of the procedure, a TR band was placed over the puncture site and hemostasis was obtained. There were no immediate complications. I supervised sedation from 9:35 AM to 9:50 AM with versed 2 mg/fentanyl 25 mcg during the procedure. An independent trained observer pushed meds at my direction. We monitored the patient's level of consciousness and vital signs/physiologic status throughout the procedure duration (see times listed previously). 140 cc contrast was utilized. <20cc EBL. Pt declined to have me call his wife to provide updates on findings/plans, he preferred to call her and defer our phone call. FINDINGS     LVGRAM    LVEDP 12   GRADIENT ACROSS AORTIC VALVE None   LV FUNCTION EF 40%   WALL MOTION Inferoapical hypokinesis   MITRAL REGURGITATION Mild       CRESPO is widely patent    CORONARY ARTERIES    LM Less than 10% [id number]-tya-siaqci stenosis         LAD Moderately calcified proximal-mid 50 to 60% stenosis, distal 60 to 75% stenosis.   Vessel wraps around the apex and distal disease is diffuse. LCX Moderately calcified, proximal-mid 60 to 75% stenosis. OM 1 is a large vessel with proximal-mid 30 to 40% stenosis. There is a branch of this vessel which is small in caliber which has a 99 to 100% mid-distal  with faint antegrade filling. RCA Dominant, calcified, proximal-mid 60% stenosis followed by mid-distal 100%  with well-developed left-to-right collaterals. CONCLUSIONS:     Multivessel CAD/ASHD  We will refer to CT surgery for consideration of CABG  If not considered a candidate for CABG or per patient preference, can consider high risk multivessel PCI. His anatomy may make revascularization difficult due to diffuse nature of disease as well as branch vessel stenosis and CTOs.     Resume Xarelto tomorrow

## 2023-02-09 ENCOUNTER — TELEPHONE (OUTPATIENT)
Dept: CARDIOLOGY CLINIC | Age: 68
End: 2023-02-09

## 2023-02-09 NOTE — PROGRESS NOTES
[address]   CARDIAC EVALUATION NOTE  [phone]      PCP:  Reji Lindo MD    Reason for Consultation/Chief Complaint: follow up for HTN and HLD    Subjective   History of Present Illness:  Barbara Rasheed is a 75 y.o. patient with a history of HTN, HLD and mesenteric vein thrombosis who presents to establish with cardiology. He moved from [place], [place] in 10/2016. He reports he started seeing Dr. Hassell Duverney at age 47 for about 10 years. He started seeing the cardiologist for screening due to his father's vascular and heart disease. He has had 3 stress tests with the cardiologist. He has since loss 30 lbs of weight since retiring. He has made several diet changes. He had mesenteric vein thrombosis in 02/2018. He was on Xarelto for a short period and has since stopped the Xarelto. At his OV on 12/09/19 he was started on Norvasc 2.5 mg daily. This was increased to twice a day on 9/16/2020. Then increased to 10 mg daily on 3/4/2021 in the ED. Last OV he denied chest pain, sob, palpitations, dizziness or syncope. He notes that Dr. Eva Daley is following him for his Xarelto management. He had been monitoring BP at home and it has been in 150's. Since last office 11/19/20 visit he was started on coreg 3.125 BID for blood pressure management. Today he brings in a log of his blood pressures. He states he has not had it calibrated. He states he has made dietary changes to include more chicken and fish. Patient currently denies any weight gain, edema, palpitations, chest pain, shortness of breath, dizziness, and syncope. He is taking all medications as prescribed, tolerating well. 1/26/23 ECHO showed EF of 55%. Grade I DD. Mild mitral regurgitation. Mild aortic regurgitation. 1/26/23 NM Stress Test showed abnormal moderate risk myocardial perfusion study. Small to moderate sized area of mixed ischemia and scar within the distal infero-lateral and apical-lateral segments.     2/8/23 Left Heart Cath. LM <10%. LAD proximal-mid 50-60%, distal 60-75% stenosis. LCX proximal-mid 60-75% stenosis. OM 1 proximal-mid 30-40% stenosis. RCA proximal-mid 60%, mid-distal 100%. Today he presents to discuss high risk multivessel PCI stenting and reports that he has been feeling okay. He reports that his wrist has healed fine from his recent cath (2/10/23). He presents still wearing the bandage and has been advised that it is okay to remove the bandage. Past Medical History:   has a past medical history of CKD (chronic kidney disease), DM (diabetes mellitus), type 2 ([address] Hx of blood clots, Hyperlipidemia, Hypertension, Ischemic colitis ([address] Mesenteric vein thrombosis ([address] PONV (postoperative nausea and vomiting), Portal vein thrombosis, Symptomatic cholelithiasis, and Thrombosis. Surgical History:   has a past surgical history that includes Ankle surgery; Wrist surgery; Tonsillectomy; shoulder surgery (Right, 2012); Colonoscopy (03/08/2017); and Cholecystectomy, laparoscopic (N/A, 8/13/2020). Social History:   reports that he has never smoked. He has never used smokeless tobacco. He reports current alcohol use. He reports that he does not use drugs. Family History:  family history includes Breast Cancer in his mother; Coronary Art Dis in his father; Diabetes in his sister. Home Medications:  Were reviewed and are listed in nursing record and/or below  Prior to Admission medications    Medication Sig Start Date End Date Taking?  Authorizing Provider   rivaroxaban (XARELTO) 20 MG TABS tablet Take 1 tablet by mouth daily (with breakfast) 2/9/23  Yes Juan Diego Holt MD   dapagliflozin (FARXIGA) 5 MG tablet Take 5 mg by mouth every morning Starting in 2023 11/14/22  Yes Historical Provider, MD   atorvastatin (LIPITOR) 40 MG tablet Take 40 mg by mouth nightly 10/17/22  Yes Historical Provider, MD   lisinopril (PRINIVIL;ZESTRIL) 10 MG tablet Take 1 tablet by mouth daily 10/10/22  Yes Jenifer Milligan Anne Costa MD   amLODIPine (NORVASC) 10 MG tablet Take 1 tablet by mouth once daily 7/21/22  Yes Liam Green MD   carvedilol (COREG) 3.125 MG tablet Take 1 tablet by mouth 2 times daily (with meals) 5/4/22  Yes Liam Green MD   furosemide (LASIX) 20 MG tablet Take 1 tablet by mouth daily  Patient taking differently: Take 20 mg by mouth as needed 9/27/21  Yes Rafael Vu MD          Allergies:  Patient has no known allergies. Review of Systems:   A 14 point review of symptoms completed. Pertinent positives identified in the HPI, all other review of symptoms negative as below.       Objective   PHYSICAL EXAM:    Vitals:    07/27/21 1114   BP: (!) 160/80   Pulse: 56   SpO2: 98%    Weight: 184 lb (83.5 kg)         General Appearance:  Alert, cooperative, no distress, appears stated age   Neck: Supple, symmetrical, no carotid bruit or JVD   Lungs:   Clear to auscultation bilaterally, respirations unlabored   Chest Wall:  No deformity or tenderness   Heart:  Regular rate and rhythm, S1, S2 normal,  1/6 sys Murmur present, no rub or gallop   Abdomen:   Soft, non-tender, bowel sounds active all four quadrants,  no masses, no organomegaly   Extremities: Extremities normal, atraumatic, no cyanosis or edema   Pulses: 2+ and symmetric, including rt radial    Skin: Skin color, texture, turgor normal, no rashes or lesions   Pysch: Normal mood and affect   Neurologic: Normal gross motor and sensory exam.         Labs   CBC:   Lab Results   Component Value Date/Time    WBC 7.0 02/08/2023 07:05 AM    RBC 5.33 02/08/2023 07:05 AM    HGB 15.0 02/08/2023 07:05 AM    HCT 46.0 02/08/2023 07:05 AM    MCV 86.2 02/08/2023 07:05 AM    RDW 14.0 02/08/2023 07:05 AM     02/08/2023 07:05 AM     CMP:  Lab Results   Component Value Date/Time     02/08/2023 07:05 AM    K 4.0 02/08/2023 07:05 AM     02/08/2023 07:05 AM    CO2 23 02/08/2023 07:05 AM    BUN 23 02/08/2023 07:05 AM    CREATININE 1.1 02/08/2023 07:05 AM GFRAA >60 10/04/2022 01:38 PM    AGRATIO 1.8 2021 01:45 PM    LABGLOM >60 2023 07:05 AM    GLUCOSE 143 2023 07:05 AM    PROT 6.7 2021 01:45 PM    CALCIUM 9.4 2023 07:05 AM    BILITOT 0.3 2021 01:45 PM    ALKPHOS 41 2021 01:45 PM    AST 19 2021 01:45 PM    ALT 15 2021 01:45 PM     PT/INR:  No results found for: PTINR  HgBA1c:  Lab Results   Component Value Date    LABA1C 6.6 2021     No results found for: CKTOTAL, CKMB, CKMBINDEX, TROPONINI  Lab Results   Component Value Date    CHOL 91 2023    CHOL 130 2020    CHOL 123 2018     Lab Results   Component Value Date    TRIG 97 2023    TRIG 74 2021    TRIG 112 2020     Lab Results   Component Value Date    HDL 39 (L) 2023    HDL 37 (L) 2022    HDL 41 2020     Lab Results   Component Value Date    LDLCALC 33 2023    LDLCALC 44 2022    LDLCALC 67 2020     Lab Results   Component Value Date    LABVLDL 19 2023    LABVLDL 16 2022    LABVLDL 22 2020     No results found for: CHOLHDLRATIO      Cardiac Data     Last EK20  SR HR 63     20, sbrady     Echo: 2020  Summary   Normal left ventricle size, wall thickness, and systolic function with a   visually estimated ejection fraction of 55%. No regional wall motion abnormalities are seen. Normal left ventricular diastolic filling pressure. No significant valvular disease. ECHO: 23   Summary   Normal left ventricle systolic function with an estimated ejection fraction   of 55%. No regional wall motion abnormalities are seen. Grade I diastolic dysfunction with normal filing pressure. Mild mitral regurgitation. MIld aortic regurgitation. Stress Test:23  Summary  Abnormal moderate risk myocardial perfusion study.   There is a small to moderate sized area of mixed ischemia and scar within  the distal infero-lateral and apical-lateral segments. The estimated left ventricular function is mildly reduced @ 52%. Cath: 2/8/23  CORONARY ARTERIES     LM Less than 10% [id number]-zzs-ijspbd stenosis            LAD Moderately calcified proximal-mid 50 to 60% stenosis, distal 60 to 75% stenosis. Vessel wraps around the apex and distal disease is diffuse. LCX Moderately calcified, proximal-mid 60 to 75% stenosis. OM 1 is a large vessel with proximal-mid 30 to 40% stenosis. There is a branch of this vessel which is small in caliber which has a 99 to 100% mid-distal  with faint antegrade filling. RCA Dominant, calcified, proximal-mid 60% stenosis followed by mid-distal 100%  with well-developed left-to-right collaterals. CONCLUSIONS:      Multivessel CAD/ASHD  We will refer to CT surgery for consideration of CABG  If not considered a candidate for CABG or per patient preference, can consider high risk multivessel PCI. His anatomy may make revascularization difficult due to diffuse nature of disease as well as branch vessel stenosis and CTOs. Resume Xarelto tomorrow    Studies:       I have reviewed labs and imaging/xray/diagnostic testing in this note. Assessment           1. Mixed hyperlipidemia    2. Essential hypertension    3. Mesenteric vein thrombosis (HCC)               Plan   Continue taking all the same medications for chronic heart conditions above   Pt is agreeable to multivessel PCI . Tyra with scheduling will contact if he wishes to choose that route for revasc.    He understands this would be considered a higher risk procedure with gen anaesthesia and that  is generally more difficult to fully revasc and may limit procedure outcomes   Keep appointment with Dr. Sergio Machado that is scheduled next week to discuss cabg as possible option for revasc   Follow up with Nurse Practitioner 3 months  DISCUSSION OF CARDIAC CATHETERIZATION PROCEDURES: The procedures, indications, risks and alternatives have been discussed with the patient and, as appropriate, with the patient's guardian . Risks discussed included, but are not limited to, bleeding, development of blood clots/emboli, damage to blood vessels, renal failure, malignant cardiac arrhythmias, stroke, heart attack, emergent coronary bypass surgery, death, dye allergy. The patient (and guardian as appropriate) expressed understanding of the aforementioned and wished to proceed. Scribe's attestation: This note was scribed in the presence of Dr. Kenny Ayala MD   by Andrew Banks LPN     I, Dr Kenny Ayala, personally performed the services described in this documentation, as scribed by the above signed scribe in my presence. It is both accurate and complete to my knowledge. I agree with the details independently gathered by the clinical support staff and the scribed note accurately describes my personal service to the patient. Thank you for allowing us to participate in the care of Alice Marx. Please call me with any questions [phone]. Kenny Ayala MD, [facility]   Interventional Cardiologist  [facility]  [phone] [facility]  [phone] [address] [place] [address]  2/10/2023 3:03 PM    I will address the patient's cardiac risk factors and adjusted pharmacologic treatment as needed. In addition, I have reinforced the need for patient directed risk factor modification. Tobacco use was discussed with the patient and educated on the negative effects and was asked not to use. All questions and concerns were addressed to the patient/family. Alternatives to my treatment were discussed.

## 2023-02-09 NOTE — TELEPHONE ENCOUNTER
SHIREEN Pt stated he would prefer stent/PCI over CABG, so scheduled appt with SHAQUILLE appt 2/10/23 to discuss. Pt confirmed he started retaking Xarelto last night.    ----- Message from Epifanio Rey MD sent at 2/8/2023  1:02 PM EST -----  Let patient know their lipid test is stable, continue current meds, lets make sure he restarted xarelto after his cath. Also, lets refer to ct surgery for consideration of  cabg. Let him know PCI/stents are an option as well and if he wants to consider that, he should also get appt w/ me to discuss further. Thanks.

## 2023-02-10 ENCOUNTER — OFFICE VISIT (OUTPATIENT)
Dept: CARDIOLOGY CLINIC | Age: 68
End: 2023-02-10
Payer: MEDICARE

## 2023-02-10 VITALS
HEIGHT: 71 IN | HEART RATE: 77 BPM | BODY MASS INDEX: 25.76 KG/M2 | DIASTOLIC BLOOD PRESSURE: 60 MMHG | OXYGEN SATURATION: 96 % | WEIGHT: 184 LBS | SYSTOLIC BLOOD PRESSURE: 120 MMHG

## 2023-02-10 DIAGNOSIS — K55.069 MESENTERIC VEIN THROMBOSIS (HCC): Chronic | ICD-10-CM

## 2023-02-10 DIAGNOSIS — E78.2 MIXED HYPERLIPIDEMIA: Primary | ICD-10-CM

## 2023-02-10 DIAGNOSIS — I10 ESSENTIAL HYPERTENSION: ICD-10-CM

## 2023-02-10 LAB — POC ACT LR: >400 SEC

## 2023-02-10 PROCEDURE — 3017F COLORECTAL CA SCREEN DOC REV: CPT | Performed by: INTERNAL MEDICINE

## 2023-02-10 PROCEDURE — G8417 CALC BMI ABV UP PARAM F/U: HCPCS | Performed by: INTERNAL MEDICINE

## 2023-02-10 PROCEDURE — 1123F ACP DISCUSS/DSCN MKR DOCD: CPT | Performed by: INTERNAL MEDICINE

## 2023-02-10 PROCEDURE — G8484 FLU IMMUNIZE NO ADMIN: HCPCS | Performed by: INTERNAL MEDICINE

## 2023-02-10 PROCEDURE — G8427 DOCREV CUR MEDS BY ELIG CLIN: HCPCS | Performed by: INTERNAL MEDICINE

## 2023-02-10 PROCEDURE — 3074F SYST BP LT 130 MM HG: CPT | Performed by: INTERNAL MEDICINE

## 2023-02-10 PROCEDURE — 1036F TOBACCO NON-USER: CPT | Performed by: INTERNAL MEDICINE

## 2023-02-10 PROCEDURE — 99215 OFFICE O/P EST HI 40 MIN: CPT | Performed by: INTERNAL MEDICINE

## 2023-02-10 PROCEDURE — 3078F DIAST BP <80 MM HG: CPT | Performed by: INTERNAL MEDICINE

## 2023-02-10 NOTE — PATIENT INSTRUCTIONS
Plan   Continue taking all the same medications for chronic heart conditions above   Pt is agreeable to multivessel PCI . Tyra with scheduling will contact  Keep appointment with Dr. Lauryn Sung that is scheduled next week  Follow up with Nurse Practitioner 3 months  DISCUSSION OF CARDIAC CATHETERIZATION PROCEDURES: The procedures, indications, risks and alternatives have been discussed with the patient and, as appropriate, with the patient's guardian . Risks discussed included, but are not limited to, bleeding, development of blood clots/emboli, damage to blood vessels, renal failure, malignant cardiac arrhythmias, stroke, heart attack, emergent coronary bypass surgery, death, dye allergy. The patient (and guardian as appropriate) expressed understanding of the aforementioned and wished to proceed.

## 2023-03-10 ENCOUNTER — ANESTHESIA EVENT (OUTPATIENT)
Dept: CARDIAC CATH/INVASIVE PROCEDURES | Age: 68
DRG: 251 | End: 2023-03-10
Payer: MEDICARE

## 2023-03-13 ENCOUNTER — ANESTHESIA (OUTPATIENT)
Dept: CARDIAC CATH/INVASIVE PROCEDURES | Age: 68
DRG: 251 | End: 2023-03-13
Payer: MEDICARE

## 2023-03-13 ENCOUNTER — HOSPITAL ENCOUNTER (INPATIENT)
Dept: CARDIAC CATH/INVASIVE PROCEDURES | Age: 68
LOS: 1 days | Discharge: HOME | DRG: 251 | End: 2023-03-14
Attending: INTERNAL MEDICINE | Admitting: INTERNAL MEDICINE
Payer: MEDICARE

## 2023-03-13 DIAGNOSIS — Z98.890 STATUS POST CARDIAC CATHETERIZATION: Primary | ICD-10-CM

## 2023-03-13 DIAGNOSIS — I10 ESSENTIAL HYPERTENSION: ICD-10-CM

## 2023-03-13 LAB
ANION GAP SERPL CALCULATED.3IONS-SCNC: 12 MMOL/L (ref 3–16)
BUN BLDV-MCNC: 22 MG/DL (ref 7–20)
CALCIUM SERPL-MCNC: 9.2 MG/DL (ref 8.3–10.6)
CHLORIDE BLD-SCNC: 103 MMOL/L (ref 99–110)
CHOLESTEROL, TOTAL: 93 MG/DL (ref 0–199)
CO2: 23 MMOL/L (ref 21–32)
CREAT SERPL-MCNC: 1 MG/DL (ref 0.8–1.3)
EKG ATRIAL RATE: 55 BPM
EKG DIAGNOSIS: NORMAL
EKG P AXIS: 48 DEGREES
EKG P-R INTERVAL: 150 MS
EKG Q-T INTERVAL: 446 MS
EKG QRS DURATION: 84 MS
EKG QTC CALCULATION (BAZETT): 426 MS
EKG R AXIS: 56 DEGREES
EKG T AXIS: 62 DEGREES
EKG VENTRICULAR RATE: 55 BPM
GFR SERPL CREATININE-BSD FRML MDRD: >60 ML/MIN/{1.73_M2}
GLUCOSE BLD-MCNC: 139 MG/DL (ref 70–99)
HCT VFR BLD CALC: 46.4 % (ref 40.5–52.5)
HDLC SERPL-MCNC: 40 MG/DL (ref 40–60)
HEMOGLOBIN: 15.5 G/DL (ref 13.5–17.5)
LDL CHOLESTEROL CALCULATED: 35 MG/DL
MCH RBC QN AUTO: 28.8 PG (ref 26–34)
MCHC RBC AUTO-ENTMCNC: 33.4 G/DL (ref 31–36)
MCV RBC AUTO: 86.2 FL (ref 80–100)
PDW BLD-RTO: 14.3 % (ref 12.4–15.4)
PLATELET # BLD: 148 K/UL (ref 135–450)
PMV BLD AUTO: 7.7 FL (ref 5–10.5)
POC ACT LR: 178 SEC
POC ACT LR: 191 SEC
POC ACT LR: 231 SEC
POTASSIUM REFLEX MAGNESIUM: 3.7 MMOL/L (ref 3.5–5.1)
RBC # BLD: 5.38 M/UL (ref 4.2–5.9)
SODIUM BLD-SCNC: 138 MMOL/L (ref 136–145)
TRIGL SERPL-MCNC: 89 MG/DL (ref 0–150)
VLDLC SERPL CALC-MCNC: 18 MG/DL
WBC # BLD: 6.4 K/UL (ref 4–11)

## 2023-03-13 PROCEDURE — 2720000010 HC SURG SUPPLY STERILE

## 2023-03-13 PROCEDURE — 2580000003 HC RX 258

## 2023-03-13 PROCEDURE — 92943 PRQ TRLUML REVSC CH OCC ANT: CPT

## 2023-03-13 PROCEDURE — 92978 ENDOLUMINL IVUS OCT C 1ST: CPT | Performed by: INTERNAL MEDICINE

## 2023-03-13 PROCEDURE — 02713ZZ DILATION OF CORONARY ARTERY, TWO ARTERIES, PERCUTANEOUS APPROACH: ICD-10-PCS | Performed by: INTERNAL MEDICINE

## 2023-03-13 PROCEDURE — C1894 INTRO/SHEATH, NON-LASER: HCPCS

## 2023-03-13 PROCEDURE — 6360000002 HC RX W HCPCS

## 2023-03-13 PROCEDURE — 2709999900 HC NON-CHARGEABLE SUPPLY

## 2023-03-13 PROCEDURE — 92943 PRQ TRLUML REVSC CH OCC ANT: CPT | Performed by: INTERNAL MEDICINE

## 2023-03-13 PROCEDURE — C1753 CATH, INTRAVAS ULTRASOUND: HCPCS

## 2023-03-13 PROCEDURE — 6370000000 HC RX 637 (ALT 250 FOR IP)

## 2023-03-13 PROCEDURE — 2580000003 HC RX 258: Performed by: INTERNAL MEDICINE

## 2023-03-13 PROCEDURE — 80048 BASIC METABOLIC PNL TOTAL CA: CPT

## 2023-03-13 PROCEDURE — 2000000000 HC ICU R&B

## 2023-03-13 PROCEDURE — 93005 ELECTROCARDIOGRAM TRACING: CPT | Performed by: INTERNAL MEDICINE

## 2023-03-13 PROCEDURE — 4A023N7 MEASUREMENT OF CARDIAC SAMPLING AND PRESSURE, LEFT HEART, PERCUTANEOUS APPROACH: ICD-10-PCS | Performed by: INTERNAL MEDICINE

## 2023-03-13 PROCEDURE — 92920 PRQ TRLUML C ANGIOP 1ART&/BR: CPT | Performed by: INTERNAL MEDICINE

## 2023-03-13 PROCEDURE — 2500000003 HC RX 250 WO HCPCS

## 2023-03-13 PROCEDURE — 6360000004 HC RX CONTRAST MEDICATION

## 2023-03-13 PROCEDURE — 5A1223Z PERFORMANCE OF CARDIAC PACING, CONTINUOUS: ICD-10-PCS | Performed by: INTERNAL MEDICINE

## 2023-03-13 PROCEDURE — C1725 CATH, TRANSLUMIN NON-LASER: HCPCS

## 2023-03-13 PROCEDURE — 80061 LIPID PANEL: CPT

## 2023-03-13 PROCEDURE — 93010 ELECTROCARDIOGRAM REPORT: CPT | Performed by: INTERNAL MEDICINE

## 2023-03-13 PROCEDURE — 85347 COAGULATION TIME ACTIVATED: CPT

## 2023-03-13 PROCEDURE — 3700000000 HC ANESTHESIA ATTENDED CARE

## 2023-03-13 PROCEDURE — B240ZZ3 ULTRASONOGRAPHY OF SINGLE CORONARY ARTERY, INTRAVASCULAR: ICD-10-PCS | Performed by: INTERNAL MEDICINE

## 2023-03-13 PROCEDURE — C1887 CATHETER, GUIDING: HCPCS

## 2023-03-13 PROCEDURE — C1769 GUIDE WIRE: HCPCS

## 2023-03-13 PROCEDURE — 85027 COMPLETE CBC AUTOMATED: CPT

## 2023-03-13 PROCEDURE — 3700000001 HC ADD 15 MINUTES (ANESTHESIA)

## 2023-03-13 PROCEDURE — 6370000000 HC RX 637 (ALT 250 FOR IP): Performed by: INTERNAL MEDICINE

## 2023-03-13 RX ORDER — HEPARIN SODIUM 1000 [USP'U]/ML
INJECTION, SOLUTION INTRAVENOUS; SUBCUTANEOUS
Status: COMPLETED | OUTPATIENT
Start: 2023-03-13 | End: 2023-03-13

## 2023-03-13 RX ORDER — SODIUM CHLORIDE, SODIUM LACTATE, POTASSIUM CHLORIDE, CALCIUM CHLORIDE 600; 310; 30; 20 MG/100ML; MG/100ML; MG/100ML; MG/100ML
INJECTION, SOLUTION INTRAVENOUS CONTINUOUS
Status: DISCONTINUED | OUTPATIENT
Start: 2023-03-13 | End: 2023-03-13

## 2023-03-13 RX ORDER — MIDAZOLAM HYDROCHLORIDE 1 MG/ML
2 INJECTION INTRAMUSCULAR; INTRAVENOUS
Status: DISCONTINUED | OUTPATIENT
Start: 2023-03-13 | End: 2023-03-13

## 2023-03-13 RX ORDER — LIDOCAINE HYDROCHLORIDE 20 MG/ML
INJECTION, SOLUTION INFILTRATION; PERINEURAL PRN
Status: DISCONTINUED | OUTPATIENT
Start: 2023-03-13 | End: 2023-03-13 | Stop reason: SDUPTHER

## 2023-03-13 RX ORDER — SODIUM CHLORIDE 0.9 % (FLUSH) 0.9 %
5-40 SYRINGE (ML) INJECTION PRN
Status: DISCONTINUED | OUTPATIENT
Start: 2023-03-13 | End: 2023-03-13

## 2023-03-13 RX ORDER — SODIUM CHLORIDE 0.9 % (FLUSH) 0.9 %
5-40 SYRINGE (ML) INJECTION PRN
Status: DISCONTINUED | OUTPATIENT
Start: 2023-03-13 | End: 2023-03-14 | Stop reason: HOSPADM

## 2023-03-13 RX ORDER — LIDOCAINE HYDROCHLORIDE 10 MG/ML
1 INJECTION, SOLUTION INFILTRATION; PERINEURAL
Status: DISCONTINUED | OUTPATIENT
Start: 2023-03-13 | End: 2023-03-13

## 2023-03-13 RX ORDER — FENTANYL CITRATE 50 UG/ML
INJECTION, SOLUTION INTRAMUSCULAR; INTRAVENOUS
Status: COMPLETED
Start: 2023-03-13 | End: 2023-03-13

## 2023-03-13 RX ORDER — ATORVASTATIN CALCIUM 40 MG/1
40 TABLET, FILM COATED ORAL NIGHTLY
Status: DISCONTINUED | OUTPATIENT
Start: 2023-03-13 | End: 2023-03-14 | Stop reason: HOSPADM

## 2023-03-13 RX ORDER — PROPOFOL 10 MG/ML
INJECTION, EMULSION INTRAVENOUS PRN
Status: DISCONTINUED | OUTPATIENT
Start: 2023-03-13 | End: 2023-03-13 | Stop reason: SDUPTHER

## 2023-03-13 RX ORDER — SODIUM CHLORIDE 9 MG/ML
INJECTION, SOLUTION INTRAVENOUS PRN
Status: DISCONTINUED | OUTPATIENT
Start: 2023-03-13 | End: 2023-03-13 | Stop reason: SDUPTHER

## 2023-03-13 RX ORDER — SODIUM CHLORIDE 9 MG/ML
25 INJECTION, SOLUTION INTRAVENOUS PRN
Status: DISCONTINUED | OUTPATIENT
Start: 2023-03-13 | End: 2023-03-13 | Stop reason: SDUPTHER

## 2023-03-13 RX ORDER — SODIUM CHLORIDE 0.9 % (FLUSH) 0.9 %
5-40 SYRINGE (ML) INJECTION EVERY 12 HOURS SCHEDULED
Status: DISCONTINUED | OUTPATIENT
Start: 2023-03-13 | End: 2023-03-13

## 2023-03-13 RX ORDER — GLYCOPYRROLATE 0.2 MG/ML
INJECTION INTRAMUSCULAR; INTRAVENOUS PRN
Status: DISCONTINUED | OUTPATIENT
Start: 2023-03-13 | End: 2023-03-13 | Stop reason: SDUPTHER

## 2023-03-13 RX ORDER — MEPERIDINE HYDROCHLORIDE 50 MG/ML
12.5 INJECTION INTRAMUSCULAR; INTRAVENOUS; SUBCUTANEOUS EVERY 5 MIN PRN
Status: DISCONTINUED | OUTPATIENT
Start: 2023-03-13 | End: 2023-03-13

## 2023-03-13 RX ORDER — SODIUM CHLORIDE 9 MG/ML
1000 INJECTION, SOLUTION INTRAVENOUS CONTINUOUS
Status: DISCONTINUED | OUTPATIENT
Start: 2023-03-13 | End: 2023-03-14 | Stop reason: HOSPADM

## 2023-03-13 RX ORDER — ONDANSETRON 2 MG/ML
4 INJECTION INTRAMUSCULAR; INTRAVENOUS EVERY 6 HOURS PRN
Status: DISCONTINUED | OUTPATIENT
Start: 2023-03-13 | End: 2023-03-14 | Stop reason: HOSPADM

## 2023-03-13 RX ORDER — ATROPINE SULFATE 0.1 MG/ML
INJECTION INTRAVENOUS
Status: COMPLETED | OUTPATIENT
Start: 2023-03-13 | End: 2023-03-13

## 2023-03-13 RX ORDER — ONDANSETRON 2 MG/ML
4 INJECTION INTRAMUSCULAR; INTRAVENOUS EVERY 30 MIN PRN
Status: DISCONTINUED | OUTPATIENT
Start: 2023-03-13 | End: 2023-03-13

## 2023-03-13 RX ORDER — LORAZEPAM 0.5 MG/1
0.5 TABLET ORAL
Status: DISCONTINUED | OUTPATIENT
Start: 2023-03-13 | End: 2023-03-13

## 2023-03-13 RX ORDER — OXYCODONE HYDROCHLORIDE 5 MG/1
5 TABLET ORAL PRN
Status: DISCONTINUED | OUTPATIENT
Start: 2023-03-13 | End: 2023-03-13

## 2023-03-13 RX ORDER — DEXAMETHASONE SODIUM PHOSPHATE 4 MG/ML
INJECTION, SOLUTION INTRA-ARTICULAR; INTRALESIONAL; INTRAMUSCULAR; INTRAVENOUS; SOFT TISSUE PRN
Status: DISCONTINUED | OUTPATIENT
Start: 2023-03-13 | End: 2023-03-13 | Stop reason: SDUPTHER

## 2023-03-13 RX ORDER — ONDANSETRON 2 MG/ML
INJECTION INTRAMUSCULAR; INTRAVENOUS PRN
Status: DISCONTINUED | OUTPATIENT
Start: 2023-03-13 | End: 2023-03-13 | Stop reason: SDUPTHER

## 2023-03-13 RX ORDER — ASPIRIN 325 MG
325 TABLET ORAL ONCE
Status: COMPLETED | OUTPATIENT
Start: 2023-03-13 | End: 2023-03-13

## 2023-03-13 RX ORDER — PHENYLEPHRINE HCL IN 0.9% NACL 1 MG/10 ML
SYRINGE (ML) INTRAVENOUS PRN
Status: DISCONTINUED | OUTPATIENT
Start: 2023-03-13 | End: 2023-03-13 | Stop reason: SDUPTHER

## 2023-03-13 RX ORDER — DIPHENHYDRAMINE HYDROCHLORIDE 50 MG/ML
6.25 INJECTION INTRAMUSCULAR; INTRAVENOUS
Status: DISCONTINUED | OUTPATIENT
Start: 2023-03-13 | End: 2023-03-13

## 2023-03-13 RX ORDER — KETAMINE HCL IN NACL, ISO-OSM 100MG/10ML
SYRINGE (ML) INJECTION PRN
Status: DISCONTINUED | OUTPATIENT
Start: 2023-03-13 | End: 2023-03-13 | Stop reason: SDUPTHER

## 2023-03-13 RX ORDER — ACETAMINOPHEN 325 MG/1
650 TABLET ORAL EVERY 4 HOURS PRN
Status: DISCONTINUED | OUTPATIENT
Start: 2023-03-13 | End: 2023-03-14 | Stop reason: HOSPADM

## 2023-03-13 RX ORDER — SODIUM CHLORIDE 9 MG/ML
INJECTION, SOLUTION INTRAVENOUS CONTINUOUS PRN
Status: DISCONTINUED | OUTPATIENT
Start: 2023-03-13 | End: 2023-03-13 | Stop reason: SDUPTHER

## 2023-03-13 RX ORDER — SODIUM CHLORIDE 0.9 % (FLUSH) 0.9 %
5-40 SYRINGE (ML) INJECTION EVERY 12 HOURS SCHEDULED
Status: DISCONTINUED | OUTPATIENT
Start: 2023-03-13 | End: 2023-03-14 | Stop reason: HOSPADM

## 2023-03-13 RX ORDER — OXYCODONE HYDROCHLORIDE 5 MG/1
10 TABLET ORAL PRN
Status: DISCONTINUED | OUTPATIENT
Start: 2023-03-13 | End: 2023-03-13

## 2023-03-13 RX ORDER — FENTANYL CITRATE 50 UG/ML
50 INJECTION, SOLUTION INTRAMUSCULAR; INTRAVENOUS ONCE
Status: COMPLETED | OUTPATIENT
Start: 2023-03-13 | End: 2023-03-13

## 2023-03-13 RX ORDER — ROCURONIUM BROMIDE 10 MG/ML
INJECTION, SOLUTION INTRAVENOUS PRN
Status: DISCONTINUED | OUTPATIENT
Start: 2023-03-13 | End: 2023-03-13 | Stop reason: SDUPTHER

## 2023-03-13 RX ORDER — SODIUM CHLORIDE 9 MG/ML
INJECTION, SOLUTION INTRAVENOUS PRN
Status: DISCONTINUED | OUTPATIENT
Start: 2023-03-13 | End: 2023-03-14 | Stop reason: HOSPADM

## 2023-03-13 RX ADMIN — ONDANSETRON 4 MG: 2 INJECTION INTRAMUSCULAR; INTRAVENOUS at 14:32

## 2023-03-13 RX ADMIN — GLYCOPYRROLATE 0.2 MG: 0.2 INJECTION, SOLUTION INTRAMUSCULAR; INTRAVENOUS at 09:31

## 2023-03-13 RX ADMIN — HEPARIN SODIUM 2000 UNITS: 1000 INJECTION, SOLUTION INTRAVENOUS; SUBCUTANEOUS at 12:22

## 2023-03-13 RX ADMIN — HEPARIN SODIUM 3000 UNITS: 1000 INJECTION, SOLUTION INTRAVENOUS; SUBCUTANEOUS at 10:43

## 2023-03-13 RX ADMIN — FENTANYL CITRATE 50 MCG: 50 INJECTION, SOLUTION INTRAMUSCULAR; INTRAVENOUS at 17:30

## 2023-03-13 RX ADMIN — SODIUM CHLORIDE: 9 INJECTION, SOLUTION INTRAVENOUS at 08:00

## 2023-03-13 RX ADMIN — SUGAMMADEX 200 MG: 100 INJECTION, SOLUTION INTRAVENOUS at 14:32

## 2023-03-13 RX ADMIN — DEXAMETHASONE SODIUM PHOSPHATE 8 MG: 4 INJECTION, SOLUTION INTRAMUSCULAR; INTRAVENOUS at 08:40

## 2023-03-13 RX ADMIN — HEPARIN SODIUM 2000 UNITS: 1000 INJECTION, SOLUTION INTRAVENOUS; SUBCUTANEOUS at 10:02

## 2023-03-13 RX ADMIN — Medication 100 MCG: at 09:25

## 2023-03-13 RX ADMIN — ATROPINE SULFATE 1 MG: 0.1 INJECTION INTRAVENOUS at 14:11

## 2023-03-13 RX ADMIN — ROCURONIUM BROMIDE 25 MG: 10 SOLUTION INTRAVENOUS at 09:00

## 2023-03-13 RX ADMIN — HEPARIN SODIUM 3000 UNITS: 1000 INJECTION, SOLUTION INTRAVENOUS; SUBCUTANEOUS at 11:10

## 2023-03-13 RX ADMIN — HEPARIN SODIUM 5000 UNITS: 1000 INJECTION, SOLUTION INTRAVENOUS; SUBCUTANEOUS at 09:36

## 2023-03-13 RX ADMIN — Medication 325 MG: at 07:10

## 2023-03-13 RX ADMIN — ROCURONIUM BROMIDE 50 MG: 10 SOLUTION INTRAVENOUS at 08:32

## 2023-03-13 RX ADMIN — Medication 200 MCG: at 14:03

## 2023-03-13 RX ADMIN — ROCURONIUM BROMIDE 25 MG: 10 SOLUTION INTRAVENOUS at 11:11

## 2023-03-13 RX ADMIN — GLYCOPYRROLATE 0.2 MG: 0.2 INJECTION, SOLUTION INTRAMUSCULAR; INTRAVENOUS at 08:40

## 2023-03-13 RX ADMIN — ROCURONIUM BROMIDE 25 MG: 10 SOLUTION INTRAVENOUS at 12:22

## 2023-03-13 RX ADMIN — DEXMEDETOMIDINE HYDROCHLORIDE 10 MCG: 100 INJECTION, SOLUTION INTRAVENOUS at 09:00

## 2023-03-13 RX ADMIN — ONDANSETRON 4 MG: 2 INJECTION INTRAMUSCULAR; INTRAVENOUS at 08:40

## 2023-03-13 RX ADMIN — ATORVASTATIN CALCIUM 40 MG: 40 TABLET, FILM COATED ORAL at 22:58

## 2023-03-13 RX ADMIN — TICAGRELOR 90 MG: 90 TABLET ORAL at 22:58

## 2023-03-13 RX ADMIN — Medication 25 MG: at 08:40

## 2023-03-13 RX ADMIN — PROPOFOL 150 MG: 10 INJECTION, EMULSION INTRAVENOUS at 08:32

## 2023-03-13 RX ADMIN — Medication 200 MCG: at 14:08

## 2023-03-13 RX ADMIN — ATROPINE SULFATE 1 MG: 0.1 INJECTION INTRAVENOUS at 14:15

## 2023-03-13 RX ADMIN — LIDOCAINE HYDROCHLORIDE 100 MG: 20 INJECTION, SOLUTION INFILTRATION; PERINEURAL at 08:32

## 2023-03-13 RX ADMIN — SODIUM CHLORIDE: 9 INJECTION, SOLUTION INTRAVENOUS at 10:30

## 2023-03-13 RX ADMIN — PHENYLEPHRINE HYDROCHLORIDE 25 MCG/MIN: 10 INJECTION INTRAVENOUS at 09:00

## 2023-03-13 RX ADMIN — SODIUM CHLORIDE 1000 ML: 9 INJECTION, SOLUTION INTRAVENOUS at 20:32

## 2023-03-13 RX ADMIN — Medication 200 MCG: at 10:47

## 2023-03-13 RX ADMIN — DEXMEDETOMIDINE HYDROCHLORIDE 10 MCG: 100 INJECTION, SOLUTION INTRAVENOUS at 08:40

## 2023-03-13 RX ADMIN — Medication 100 MCG: at 09:22

## 2023-03-13 RX ADMIN — ROCURONIUM BROMIDE 25 MG: 10 SOLUTION INTRAVENOUS at 10:00

## 2023-03-13 ASSESSMENT — PAIN DESCRIPTION - LOCATION: LOCATION: GROIN

## 2023-03-13 ASSESSMENT — PAIN DESCRIPTION - ORIENTATION: ORIENTATION: RIGHT

## 2023-03-13 ASSESSMENT — PAIN DESCRIPTION - DESCRIPTORS: DESCRIPTORS: SHARP

## 2023-03-13 NOTE — ANESTHESIA PRE PROCEDURE
Department of Anesthesiology  Preprocedure Note       Name:  Erum Nielsen   Age:  75 y.o.  :  1955                                          MRN:  [id number]         Date:  3/13/2023      Surgeon: * No surgeons listed *    Procedure: * No procedures listed *    Medications prior to admission:   Prior to Admission medications    Medication Sig Start Date End Date Taking?  Authorizing Provider   rivaroxaban (XARELTO) 20 MG TABS tablet Take 1 tablet by mouth daily (with breakfast) 23   Krystle Balderas MD   dapagliflozin Christia Patron) 5 MG tablet Take 5 mg by mouth every morning Starting in 20   Historical Provider, MD   atorvastatin (LIPITOR) 40 MG tablet Take 40 mg by mouth nightly 10/17/22   Historical Provider, MD   lisinopril (PRINIVIL;ZESTRIL) 10 MG tablet Take 1 tablet by mouth daily 10/10/22   Leona Castellon MD   amLODIPine (NORVASC) 10 MG tablet Take 1 tablet by mouth once daily 22   Krystle Balderas MD   carvedilol (COREG) 3.125 MG tablet Take 1 tablet by mouth 2 times daily (with meals) 22   Krystle Balderas MD   furosemide (LASIX) 20 MG tablet Take 1 tablet by mouth daily  Patient taking differently: Take 20 mg by mouth as needed 21   Leona Castellon MD       Current medications:    Current Facility-Administered Medications   Medication Dose Route Frequency Provider Last Rate Last Admin    sodium chloride flush 0.9 % injection 5-40 mL  5-40 mL IntraVENous 2 times per day Krystle Balderas MD        sodium chloride flush 0.9 % injection 5-40 mL  5-40 mL IntraVENous PRN Krystle Balderas MD        0.9 % sodium chloride infusion   IntraVENous PRN Krystle Balderas MD        ondansetron [facility]) injection 4 mg  4 mg IntraVENous Q6H PRN Krystle Balderas MD        LORazepam (ATIVAN) tablet 0.5 mg  0.5 mg Oral Once PRN Krystle Balderas MD           Allergies:  No Known Allergies    Problem List:    Patient Active Problem List   Diagnosis Code    Mesenteric vein thrombosis ([address] K55.069    Essential hypertension I10    Mixed hyperlipidemia E78.2    Chronic abdominal pain R10.9, G89.29    Stage 3 chronic kidney disease (HCC) N18.30    Symptomatic cholelithiasis K80.20    Murmur R01.1    Colitis K52.9    Type 2 diabetes mellitus E11.9       Past Medical History:        Diagnosis Date    CKD (chronic kidney disease)     DM (diabetes mellitus), type 2 [facility]     Hx of blood clots     Hyperlipidemia     Hypertension     Ischemic colitis ([facility]     Mesenteric vein thrombosis (HCC)     PONV (postoperative nausea and vomiting)     Portal vein thrombosis     Symptomatic cholelithiasis     Thrombosis        Past Surgical History:        Procedure Laterality Date    ANKLE SURGERY      left    CHOLECYSTECTOMY, LAPAROSCOPIC N/A 8/13/2020    LAPAROSCOPIC CHOLECYSTECTOMY WITH INTRAOPERATIVE CHOLANGIOGRAM performed by Pranav Carpenter MD at [address] [place] [address]  03/08/2017    SHOULDER SURGERY Right 2012    TONSILLECTOMY      WRIST SURGERY      right       Social History:    Social History     Tobacco Use    Smoking status: Never    Smokeless tobacco: Never   Substance Use Topics    Alcohol use: Yes     Comment: 1-2 drinks per year                                Counseling given: Not Answered      Vital Signs (Current):   Vitals:    03/13/23 0630   Weight: 188 lb 6.4 oz (85.5 kg)   Height: 5' 11\" (1.803 m)                                              BP Readings from Last 3 Encounters:   02/10/23 120/60   12/12/22 132/62   10/10/22 137/77       NPO Status:                                                                                 BMI:   Wt Readings from Last 3 Encounters:   03/13/23 188 lb 6.4 oz (85.5 kg)   02/10/23 184 lb (83.5 kg)   02/08/23 192 lb 6.4 oz (87.3 kg)     Body mass index is 26.28 kg/m².     CBC:   Lab Results   Component Value Date/Time    WBC 6.4 03/13/2023 07:00 AM    RBC 5.38 03/13/2023 07:00 AM    HGB 15.5 03/13/2023 07:00 AM    HCT 46.4 03/13/2023 07:00 AM    MCV 86.2 03/13/2023 07:00 AM    RDW 14.3 03/13/2023 07:00 AM     03/13/2023 07:00 AM       CMP:   Lab Results   Component Value Date/Time     03/13/2023 07:00 AM    K 3.7 03/13/2023 07:00 AM     03/13/2023 07:00 AM    CO2 23 03/13/2023 07:00 AM    BUN 22 03/13/2023 07:00 AM    CREATININE 1.0 03/13/2023 07:00 AM    GFRAA >60 10/04/2022 01:38 PM    AGRATIO 1.8 03/30/2021 01:45 PM    LABGLOM >60 03/13/2023 07:00 AM    GLUCOSE 139 03/13/2023 07:00 AM    PROT 6.7 03/30/2021 01:45 PM    CALCIUM 9.2 03/13/2023 07:00 AM    BILITOT 0.3 03/30/2021 01:45 PM    ALKPHOS 41 03/30/2021 01:45 PM    AST 19 03/30/2021 01:45 PM    ALT 15 03/30/2021 01:45 PM       POC Tests: No results for input(s): POCGLU, POCNA, POCK, POCCL, POCBUN, POCHEMO, POCHCT in the last 72 hours. Coags:   Lab Results   Component Value Date/Time    PROTIME 16.0 02/21/2021 10:09 AM    INR 1.37 02/21/2021 10:09 AM    APTT 73.9 02/21/2018 01:44 AM       HCG (If Applicable): No results found for: PREGTESTUR, PREGSERUM, HCG, HCGQUANT     ABGs: No results found for: PHART, PO2ART, IZE4LCQ, UWV9TMU, BEART, P1MRXPMU     Type & Screen (If Applicable):  No results found for: LABABO, LABRH    Drug/Infectious Status (If Applicable):  No results found for: HIV, HEPCAB    COVID-19 Screening (If Applicable): No results found for: COVID19        Anesthesia Evaluation  Patient summary reviewed and Nursing notes reviewed   history of anesthetic complications: PONV. Airway: Mallampati: II     Neck ROM: full     Dental:          Pulmonary:                              Cardiovascular:    (+) hypertension:,                   Neuro/Psych:               GI/Hepatic/Renal:             Endo/Other:    (+) Diabetes, . Abdominal:             Vascular: Other Findings:           Anesthesia Plan      general     ASA 3     (Medications & allergies reviewed  All available lab & EKG data reviewed)  Induction: intravenous.       Anesthetic plan and risks discussed with patient. Plan discussed with CRNA.                     Geetha Worthy MD   3/13/2023

## 2023-03-13 NOTE — PROGRESS NOTES
Interventional Cardiology: Followed up on patient for post procedure check. Pt w/o complaints, no cp/sob, dizziness. No groin pain/back pain/abd pain, n/v.   Vitals improved, hr 60-70bpm.      D/w bedside RN. Temp pacer removed. Pull remaining lines if act <200 and bedrest x7hrs thereafter. Plan for f/u ekg as well. Pt updated on findings/plans from cath/pta today. D/w family, updated them.

## 2023-03-13 NOTE — H&P
Brief Pre-Op Note/Sedation Assessment      Avery Marin  1955  [id number]  8:04 AM    Planned Procedure: Cardiac Catheterization Procedure  Post Procedure Plan: Return to same level of care  Consent: I have discussed with the patient and/or the patient representative the indication, alternatives, and the possible risks and/or complications of the planned procedure and the anesthesia methods. The patient and/or patient representative appear to understand and agree to proceed. DISCUSSION OF CARDIAC CATHETERIZATION PROCEDURES: The procedures, indications, risks and alternatives have been discussed with the patient and, as appropriate, with the patient's guardian . Risks discussed included, but are not limited to, bleeding, development of blood clots/emboli, damage to blood vessels, renal failure, malignant cardiac arrhythmias, stroke, heart attack, emergent coronary bypass surgery, death, dye allergy. The patient (and guardian as appropriate) expressed understanding of the aforementioned and wished to proceed. Pt/family understand this a high risk procedure, there are higher risks of complications/death. Having heard r/b/a/o, pt/family understand/agree and wish to proceed. They understand that follow up procedures may be needed, particularly if retrograde options need additional exploration and to treat left sided disease. Chief Complaint:   Anginal Equivalent  Dyspnea      Indications for Cath Procedure:  Presentation:  Worsening Angina  2. Anginal Classification within 2 weeks:  CCS III - Symptoms with everyday living activities, i.e., moderate limitation  3. Angina Symptoms Assessment:  Atypical Chest Pain  4. Heart Failure Class within last 2 weeks:  Yes:  Heart Failure Type: Diastolic Severity:  Class III - Symptoms of HF on less-than-ordinary exertion  5. Cardiovascular Instability:  No    Prior Ischemic Workup/Eval:  Pre-Procedural Medications: Yes: Beta Blockers and STATIN  2. Stress Test Completed? Yes:  Stress or Imaging Studies Performed (within ANY time period):   Type:  Stress Nuclear  Results:  Positive:  Myocardial Perfusion Defects (Nuclear) Extent of Ischemia:  Intermediate    Does Patient need surgery? Cath Valve Surgery:  No    Pre-Procedure Medical History:  Vital Signs:  Ht 5' 11\" (1.803 m)   Wt 188 lb 6.4 oz (85.5 kg)   BMI 26.28 kg/m²     /68  HR 79    Allergies:  No Known Allergies  Medications:    Current Facility-Administered Medications   Medication Dose Route Frequency Provider Last Rate Last Admin    sodium chloride flush 0.9 % injection 5-40 mL  5-40 mL IntraVENous 2 times per day Angle Peres MD        sodium chloride flush 0.9 % injection 5-40 mL  5-40 mL IntraVENous PRN Angle Peres MD        0.9 % sodium chloride infusion   IntraVENous PRN Angle Peres MD        ondansetron [facility]) injection 4 mg  4 mg IntraVENous Q6H PRN Angle Peres MD        LORazepam (ATIVAN) tablet 0.5 mg  0.5 mg Oral Once PRN Angle Prees MD           Past Medical History:    Past Medical History:   Diagnosis Date    CKD (chronic kidney disease)     DM (diabetes mellitus), type 2 ([facility] [address]     Hx of blood clots     Hyperlipidemia     Hypertension     Ischemic colitis ([facility] [address]     Mesenteric vein thrombosis ([facility])     PONV (postoperative nausea and vomiting)     Portal vein thrombosis     Symptomatic cholelithiasis     Thrombosis        Surgical History:    Past Surgical History:   Procedure Laterality Date    ANKLE SURGERY      left    CHOLECYSTECTOMY, LAPAROSCOPIC N/A 8/13/2020    LAPAROSCOPIC CHOLECYSTECTOMY WITH INTRAOPERATIVE CHOLANGIOGRAM performed by Richie Armendariz MD at [address] [place] [address]  03/08/2017    SHOULDER SURGERY Right 2012    TONSILLECTOMY      WRIST SURGERY      right             Pre-Sedation:  Pre-Sedation Documentation and Exam:  I have personally completed a history, physical exam & review of systems for this patient (see notes).     Prior History of Anesthesia Complications:   none    Modified Mallampati:  III (soft palate, base of uvula visible)    ASA Classification:  Class 3 - A patient with severe systemic disease that limits activity but is not incapacitating    Bela Scale: Activity:  2 - Able to move 4 extremities voluntarily on command  Respiration:  2 - Able to breathe deeply and cough freely  Circulation:  2 - BP+/- 20mmHg of normal  Consciousness:  2 - Fully awake  Oxygen Saturation (color):  2 - Able to maintain oxygen saturation >92% on room air    Sedation/Anesthesia Plan:  Guard the patient's safety and welfare. Minimize physical discomfort and pain. Minimize negative psychological responses to treatment by providing sedation and analgesia and maximize the potential amnesia. Patient to meet pre-procedure discharge plan. Medication Planned:  As per anesthesiology service.       Patient is an appropriate candidate for plan of sedation:   yes      Electronically signed by Jorgito Guzman MD on 3/13/2023 at 8:04 AM

## 2023-03-13 NOTE — PROCEDURES
CARDIAC CATHETERIZATION REPORT     Procedure Date:  3/13/2023  Patient Name: Arabella Donaldson  MRN: [id number] : 1955      INDICATION     RCA       PROCEDURES PERFORMED       Coronary angiogam  Coronary cath    Temporary TV pacer insertion  IVUS of RCA  PTA of RCA  PTA of acute marginal artery       PROCEDURE DESCRIPTION   Risks/benefits/alternatives/outcomes were discussed with patient and/or family and informed consent was obtained. Using the [facility] scale, the patient's right radial artery was found to be a level B. Patient was prepped draped in the usual sterile fashion. Local anaesthetic was applied over puncture site. Using a back wall technique, a 6/7  Cro[place]ian Terumo sheath was inserted into right radial artery. Verapamil, nitroglycerin, nicardipine were administered through the sheath. Using fluoroscopic and ultrasound guidance with micropuncture kit, 8 [place] sheaths were inserted into the right common femoral vein as well as artery. The short sheath in the artery was ultimately upsized to a 45 cm Terumo destination sheath. A temporary transvenous pacer was inserted through the venous sheath. Heparin was administered. Diagnostic angiography of been previously obtained, see previous diagnostic report for full details. At the conclusion of the procedure, a TR band was placed over the puncture site and hemostasis was obtained. The groin sheaths were sutured in place for later removal there were no immediate complications. sedation was provided by the anesthesiology service, see their notes for full details. 280 cc contrast was utilized. <20cc EBL. Called wife, updated her on findings and plans and specifically discussed inability to stent at this juncture, but would consider that down the road. We discussed patricia, and possible need for perm pacer at some juncture, she demonstrated understanding.   We also demonstrated this was a longer case, higher dose of contrast/radiation and that there may be short and long term consequences for this, she demonstrated understanding. FINDINGS     See diagnostic cath report for full details, would add that vessel was tortuous and calcified. PERCUTANEOUS INTERVENTION DESCRIPTION     Heparin was used for anticoagulation, patient was preloaded with Brilinta, and a 90 cm 8 Bur[place]ian AL 0.75 guiding catheter was used to intubate the RCA. For contralateral injections/dual injections, a 7 [place] Star[place] XB 3.5 guiding cath was used to intubate the left main. Following diagnostic angiography with dual injections attention turned towards RCA and a workhorse wire was taken with a mamba catheter. There was difficulty with passing the wires into the mid RCA due to angulated RCA ostium as well as several small branches which were preferentially selected with a wire. Ultimately with a Choice PT wire the mid vessel was able to be wired and the microcatheter was able to be advanced distally. Attention then turned towards the acute marginal branch and this was wired ultimately with a Choice PT wire. There was difficulty with wiring this due to tortuosity. Ultimately the vessel was also treated with angioplasty with 1.5 as well as 2 mm balloons including cutting balloons. IVUS was performed however there was calcification which made difficult to IVUS this vessel fully. IVUS guided puncture was attempted of the proximal  Of this however this was noted be difficult and as such, the IVUS was removed from the acute marginal artery. Attention then turned towards the RCA  proximal And this was able to be crossed with a microcatheter with a Mongeau wire. As a subintimal space was entered it was felt that alternate wiring technique may be helpful and a Karla Wells second order wire was taken but subintimal space was entered.   As such, procedure was then converted to attempted ADR and the guidewire was removed in favor of a [employer] 12 wire and with that the stingray balloon was able to be advanced distally. With that, it was noted that there was blood return with a straw technique and it was felt that the true lumen may have been entered already and as such a workhorse wire was taken and ultimately with a Choice PT wire, the true lumen was entered as this was confirmed by angiography as well as free movement of the wire. However, unsuccessful attempt was made towards wiring the PDA, the wire was only able to enter the PLV. As such the procedure was then converted to a star and a 2.5 mm cutting balloon was used to dilate the mid-distal segments to the crux. Final attempt was made to wire the PDA but this was unsuccessful including with use of a dual-lumen [employer] microcatheter, was felt that procedure was completed at this juncture and patient will need to be allowed to have artery heal over 1 to 2 months with return to Cath Lab thereafter for relook angiography and possible reattempt at  PCI. During procedure, hypotension and patricia were treated with vasopressors, atropine and ivfs and pt responded to that. CONCLUSIONS:     Successful PTA of acute marginal  Successful PTA of RCA  (using STAR technique)    Plan to allow for 1 to 2 months of healing followed by relook angiography and possible PCI.     Admit to hospital for monitoring, BP/HR  Holding bp meds

## 2023-03-13 NOTE — ANESTHESIA POSTPROCEDURE EVALUATION
Department of Anesthesiology  Postprocedure Note    Patient: Trenia Simmonds  MRN: [id number]  YOB: 1955  Date of evaluation: 3/13/2023      Procedure Summary     Date: 03/13/23 Room / Location: [facility]    Anesthesia Start: 0820 Anesthesia Stop: 1448    Procedure: CHRONIC TOTAL OCCLUSION Diagnosis:       Chronic total occlusion of coronary artery      Status post cardiac catheterization      Chronic total occlusion of coronary artery    Scheduled Providers:  Responsible Provider: Zi Goel MD    Anesthesia Type: general ASA Status: 3          Anesthesia Type: No value filed.     Bela Phase I:      Bela Phase II:        Anesthesia Post Evaluation    Comments: Postoperative Anesthesia Note    Name:    Trenia Simmonds  MRN:      [id number]    Patient Vitals in the past 12 hrs:  03/13/23 0630, Height:5' 11\" (1.803 m), Weight:188 lb 6.4 oz (85.5 kg)     LABS:    CBC  Lab Results       Component                Value               Date/Time                  WBC                      6.4                 03/13/2023 07:00 AM        HGB                      15.5                03/13/2023 07:00 AM        HCT                      46.4                03/13/2023 07:00 AM        PLT                      148                 03/13/2023 07:00 AM   RENAL  Lab Results       Component                Value               Date/Time                  NA                       138                 03/13/2023 07:00 AM        K                        3.7                 03/13/2023 07:00 AM        CL                       103                 03/13/2023 07:00 AM        CO2                      23                  03/13/2023 07:00 AM        BUN                      22 (H)              03/13/2023 07:00 AM        CREATININE               1.0                 03/13/2023 07:00 AM        GLUCOSE                  139 (H)             03/13/2023 07:00 AM   COAGS  Lab Results       Component                Value Date/Time                  PROTIME                  16.0 (H)            02/21/2021 10:09 AM        INR                      1.37 (H)            02/21/2021 10:09 AM        APTT                     73.9 (H)            02/21/2018 01:44 AM     Intake & Output: In: 1000 (I.V.:1000)  Out: 2300 (Urine:2300)    Nausea & Vomiting:  No    Level of Consciousness:  Awake    Pain Assessment:  Adequate analgesia    Anesthesia Complications:  No apparent anesthetic complications    SUMMARY      Vital signs stable  OK to discharge from Stage I post anesthesia care.   Care transferred from Anesthesiology department on discharge from perioperative area

## 2023-03-14 ENCOUNTER — TELEPHONE (OUTPATIENT)
Dept: CARDIOLOGY CLINIC | Age: 68
End: 2023-03-14

## 2023-03-14 VITALS
RESPIRATION RATE: 16 BRPM | WEIGHT: 189 LBS | BODY MASS INDEX: 26.46 KG/M2 | DIASTOLIC BLOOD PRESSURE: 64 MMHG | OXYGEN SATURATION: 97 % | HEIGHT: 71 IN | HEART RATE: 89 BPM | SYSTOLIC BLOOD PRESSURE: 128 MMHG | TEMPERATURE: 99 F

## 2023-03-14 DIAGNOSIS — R01.1 MURMUR: ICD-10-CM

## 2023-03-14 DIAGNOSIS — I10 ESSENTIAL HYPERTENSION: ICD-10-CM

## 2023-03-14 DIAGNOSIS — Z98.890 STATUS POST CARDIAC CATHETERIZATION: ICD-10-CM

## 2023-03-14 DIAGNOSIS — E78.2 MIXED HYPERLIPIDEMIA: Primary | ICD-10-CM

## 2023-03-14 LAB
ANION GAP SERPL CALCULATED.3IONS-SCNC: 12 MMOL/L (ref 3–16)
BUN BLDV-MCNC: 19 MG/DL (ref 7–20)
CALCIUM SERPL-MCNC: 8.2 MG/DL (ref 8.3–10.6)
CHLORIDE BLD-SCNC: 110 MMOL/L (ref 99–110)
CO2: 20 MMOL/L (ref 21–32)
CREAT SERPL-MCNC: 1 MG/DL (ref 0.8–1.3)
EKG ATRIAL RATE: 54 BPM
EKG ATRIAL RATE: 68 BPM
EKG DIAGNOSIS: NORMAL
EKG DIAGNOSIS: NORMAL
EKG P AXIS: 32 DEGREES
EKG P AXIS: 41 DEGREES
EKG P-R INTERVAL: 124 MS
EKG P-R INTERVAL: 140 MS
EKG Q-T INTERVAL: 444 MS
EKG Q-T INTERVAL: 484 MS
EKG QRS DURATION: 84 MS
EKG QRS DURATION: 84 MS
EKG QTC CALCULATION (BAZETT): 458 MS
EKG QTC CALCULATION (BAZETT): 472 MS
EKG R AXIS: 21 DEGREES
EKG R AXIS: 64 DEGREES
EKG T AXIS: 35 DEGREES
EKG T AXIS: 70 DEGREES
EKG VENTRICULAR RATE: 54 BPM
EKG VENTRICULAR RATE: 68 BPM
GFR SERPL CREATININE-BSD FRML MDRD: >60 ML/MIN/{1.73_M2}
GLUCOSE BLD-MCNC: 156 MG/DL (ref 70–99)
HCT VFR BLD CALC: 41.1 % (ref 40.5–52.5)
HEMOGLOBIN: 13.4 G/DL (ref 13.5–17.5)
MCH RBC QN AUTO: 28.6 PG (ref 26–34)
MCHC RBC AUTO-ENTMCNC: 32.7 G/DL (ref 31–36)
MCV RBC AUTO: 87.6 FL (ref 80–100)
PDW BLD-RTO: 14.2 % (ref 12.4–15.4)
PLATELET # BLD: 144 K/UL (ref 135–450)
PMV BLD AUTO: 8 FL (ref 5–10.5)
POTASSIUM SERPL-SCNC: 4.3 MMOL/L (ref 3.5–5.1)
RBC # BLD: 4.69 M/UL (ref 4.2–5.9)
SODIUM BLD-SCNC: 142 MMOL/L (ref 136–145)
WBC # BLD: 14.5 K/UL (ref 4–11)

## 2023-03-14 PROCEDURE — 6370000000 HC RX 637 (ALT 250 FOR IP): Performed by: INTERNAL MEDICINE

## 2023-03-14 PROCEDURE — 93005 ELECTROCARDIOGRAM TRACING: CPT | Performed by: INTERNAL MEDICINE

## 2023-03-14 PROCEDURE — 80048 BASIC METABOLIC PNL TOTAL CA: CPT

## 2023-03-14 PROCEDURE — 93010 ELECTROCARDIOGRAM REPORT: CPT | Performed by: INTERNAL MEDICINE

## 2023-03-14 PROCEDURE — 99238 HOSP IP/OBS DSCHRG MGMT 30/<: CPT | Performed by: INTERNAL MEDICINE

## 2023-03-14 PROCEDURE — 2580000003 HC RX 258: Performed by: INTERNAL MEDICINE

## 2023-03-14 PROCEDURE — 6360000002 HC RX W HCPCS: Performed by: INTERNAL MEDICINE

## 2023-03-14 PROCEDURE — 85027 COMPLETE CBC AUTOMATED: CPT

## 2023-03-14 PROCEDURE — 36415 COLL VENOUS BLD VENIPUNCTURE: CPT

## 2023-03-14 RX ORDER — AMLODIPINE BESYLATE 10 MG/1
5 TABLET ORAL DAILY
Qty: 90 TABLET | Refills: 3 | Status: SHIPPED | OUTPATIENT
Start: 2023-03-14

## 2023-03-14 RX ORDER — ASPIRIN 81 MG/1
81 TABLET ORAL DAILY
Qty: 30 TABLET | Refills: 5 | Status: SHIPPED | OUTPATIENT
Start: 2023-03-14

## 2023-03-14 RX ORDER — ASPIRIN 81 MG/1
81 TABLET, CHEWABLE ORAL DAILY
Status: DISCONTINUED | OUTPATIENT
Start: 2023-03-14 | End: 2023-03-14 | Stop reason: HOSPADM

## 2023-03-14 RX ADMIN — ACETAMINOPHEN 650 MG: 325 TABLET ORAL at 05:26

## 2023-03-14 RX ADMIN — ONDANSETRON 4 MG: 2 INJECTION INTRAMUSCULAR; INTRAVENOUS at 03:37

## 2023-03-14 RX ADMIN — TICAGRELOR 90 MG: 90 TABLET ORAL at 08:17

## 2023-03-14 RX ADMIN — SODIUM CHLORIDE 1000 ML: 9 INJECTION, SOLUTION INTRAVENOUS at 08:17

## 2023-03-14 NOTE — PROGRESS NOTES
Arterial sheath site without hematoma or oozing. Arterial and venous sheath removed per policy without difficulty. Integrity of sheath inspected upon removal and no abnormalities noted. Manual pressure held X 20 minutes. Dry, sterile tegaderm applied. Pressure dressing applied. Patient tolerated well. Vital signs, groin checks, and pedal pulses will be completed per protocol (every 15 minutes X 4, every 30 minutes X 2, and every hour X 2 per protocol). Sheath removed by Alberto Casiano RN.

## 2023-03-14 NOTE — CARE COORDINATION
Case Management Assessment  Initial Evaluation and Discharge Summary    Date/Time of Evaluation: 3/14/2023 9:26 AM  Assessment Completed by: Ronel Damon RN    Patient Name: Pushpa Adair                   YOB: 1955  Diagnosis: Chronic total occlusion of coronary artery [I25.82]  Status post cardiac catheterization [Z98.890]                   Date / Time: 3/13/2023  6:33 AM    Patient Admission Status: Inpatient   Readmission Risk (Low < 19, Mod (19-27), High > 27): Readmission Risk Score: 8.6    Current PCP: Gail Cerda MD  PCP verified by CM? Yes    Chart Reviewed: Yes      History Provided by: Patient  Patient Orientation: Alert and Oriented    Patient Cognition: Alert    Hospitalization in the last 30 days (Readmission):  No    If yes, Readmission Assessment in  Navigator will be completed. Advance Directives:      Code Status: Full Code   Patient's Primary Decision Maker is: Legal Next of Kin    Primary Decision Maker: Jackie Castillo - Spouse - [phone]    Discharge Planning:    Patient lives with: Spouse/Significant Other Type of Home: House  Primary Care Giver: Self  Patient Support Systems include: Spouse/Significant Other   Current Financial resources: Medicare  Current community resources: None  Current services prior to admission: None (has cane and walker if needed, was not using PTA)            Current DME:              Type of Home Care services:  None    ADLS  Prior functional level: Independent in ADLs/IADLs  Current functional level: Independent in ADLs/IADLs  PT/OT not ordered    Family can provide assistance at DC: Yes  Would you like Case Management to discuss the discharge plan with any other family members/significant others, and if so, who?  No  Plans to Return to Present Housing: Yes  Other Identified Issues/Barriers to RETURNING to current housing: None  Potential Assistance needed at discharge: N/A            Potential DME:  [employer]  Patient expects to discharge to: House  Plan for transportation at discharge: Family    Financial  Payor: MEDICARE / Plan: MEDICARE PART A AND B / Product Type: *No Product type* /     Does insurance require precert for SNF: No    Potential assistance Purchasing Medications: No  Meds-to-Beds request: Yes    [address] [facility], [address] [phone]  [address]  Phone: [phone] Fax: [phone]    Notes:  Factors facilitating achievement of predicted outcomes: Family support, Motivated, Cooperative, and Pleasant    Barriers to discharge: None noted    Additional Case Management Notes: Pt agreeable with discharge today, wife will transport home, no discharge needs noted.       The Plan for Transition of Care is related to the following treatment goals of Chronic total occlusion of coronary artery [I25.82]  Status post cardiac catheterization [Z98.890]    Orlando Fernandez RN  Case Management Department  Ph: [phone]

## 2023-03-14 NOTE — PROGRESS NOTES
Arterial sheath site without hematoma or oozing. Arterial and venous sheath removed per policy without difficulty. Integrity of sheath inspected upon removal and no abnormalities noted. Manual pressure held X 20 minutes. Dry, sterile tegaderm applied. Pressure dressing applied. Patient tolerated well. Vital signs, groin checks, and pedal pulses will be completed per protocol (every 15 minutes X 4, every 30 minutes X 2, and every hour X 2 per protocol). Sheath removed by Kim Howard RN.

## 2023-03-14 NOTE — PLAN OF CARE
Problem: Chronic Conditions and Co-morbidities  Goal: Patient's chronic conditions and co-morbidity symptoms are monitored and maintained or improved  Outcome: Adequate for Discharge  Flowsheets (Taken 3/14/2023 0815)  Care Plan - Patient's Chronic Conditions and Co-Morbidity Symptoms are Monitored and Maintained or Improved: Monitor and assess patient's chronic conditions and comorbid symptoms for stability, deterioration, or improvement     Problem: Discharge Planning  Goal: Discharge to home or other facility with appropriate resources  Outcome: Adequate for Discharge  Flowsheets (Taken 3/14/2023 0815)  Discharge to home or other facility with appropriate resources: Identify barriers to discharge with patient and caregiver     Problem: Pain  Goal: Verbalizes/displays adequate comfort level or baseline comfort level  Outcome: Adequate for Discharge     Problem: Safety - Adult  Goal: Free from fall injury  Outcome: Adequate for Discharge

## 2023-03-14 NOTE — DISCHARGE SUMMARY
[address]  Discharge Summary  Patient ID:  Linda Price  [id number] 12 y.o. 1955    Admit date: 3/13/2023    Discharge date:      Admitting Provider: Chantel Melgar MD     Discharge Provider: Chantel Melgar MD     Admission Diagnoses: Chronic total occlusion of coronary artery [I25.82]  Status post cardiac catheterization [Z98.890]    Discharge Diagnoses:   Patient Active Problem List   Diagnosis    Mesenteric vein thrombosis (HCC)    Essential hypertension    Mixed hyperlipidemia    Chronic abdominal pain    Stage 3 chronic kidney disease ([facility] [address]    Symptomatic cholelithiasis    Murmur    Colitis    Type 2 diabetes mellitus    Status post cardiac catheterization        Discharged Condition: good  The patient was seen and examined on day of discharge and this discharge summary is in conjunction with any daily progress note from day of discharge. Hospital Course: Linda Price was admitted for elective rca  pci, underwent pta and did well. Plan for f/u cath and possible staged pci. No groin, back/abd pain, no n/v/d, no cp/sob. Groin site w/o issues per pt.      Consults:  None  Physical Exam:  /68   Pulse 70   Temp 99 °F (37.2 °C) (Bladder)   Resp 16   Ht 5' 11\" (1.803 m)   Wt 189 lb (85.7 kg)   SpO2 100%   BMI 26.36 kg/m²     Intake/Output Summary (Last 24 hours) at 3/14/2023 4555  Last data filed at 3/14/2023 0600  Gross per 24 hour   Intake 2260 ml   Output 4530 ml   Net -2270 ml     General:  Awake, alert, NAD  Skin:  Warm and dry  Neck:  JVD<8, no bruit  Chest:  Clear to auscultation, no wheezes/rhonchi/rales  Cardiovascular:  RRR S1S2, 2/6 sm   Abdomen:  Soft, nontender, +bowel sounds  Extremities:  no edema  Rt groin, mild ecchymoses, no hematoma/bruit/thrill, intact fem pulses and distal pulses bilat  Rt radial, nml pulse, no bruit/thrill/hematoma    Significant Diagnostic Studies:     Cath:                     CARDIAC CATHETERIZATION REPORT      Procedure Date: 3/13/2023  Patient Name: Agustina Sebastian  MRN: [id number]      : 1955        INDICATION      RCA         PROCEDURES PERFORMED         Coronary angiogam  Coronary cath     Temporary TV pacer insertion  IVUS of RCA  PTA of RCA  PTA of acute marginal artery         PROCEDURE DESCRIPTION   Risks/benefits/alternatives/outcomes were discussed with patient and/or family and informed consent was obtained. Using the [facility] scale, the patient's right radial artery was found to be a level B. Patient was prepped draped in the usual sterile fashion. Local anaesthetic was applied over puncture site. Using a back wall technique, a 6/7  Citizen of [place] Terumo sheath was inserted into right radial artery. Verapamil, nitroglycerin, nicardipine were administered through the sheath. Using fluoroscopic and ultrasound guidance with micropuncture kit, 8 Urdu sheaths were inserted into the right common femoral vein as well as artery. The short sheath in the artery was ultimately upsized to a 45 cm Terumo destination sheath. A temporary transvenous pacer was inserted through the venous sheath. Heparin was administered. Diagnostic angiography of been previously obtained, see previous diagnostic report for full details. At the conclusion of the procedure, a TR band was placed over the puncture site and hemostasis was obtained. The groin sheaths were sutured in place for later removal there were no immediate complications. sedation was provided by the anesthesiology service, see their notes for full details. 280 cc contrast was utilized. <20cc EBL. Called wife, updated her on findings and plans and specifically discussed inability to stent at this juncture, but would consider that down the road. We discussed patricia, and possible need for perm pacer at some juncture, she demonstrated understanding.   We also demonstrated this was a longer case, higher dose of contrast/radiation and that there may be short and long term consequences for this, she demonstrated understanding. FINDINGS      See diagnostic cath report for full details, would add that vessel was tortuous and calcified. PERCUTANEOUS INTERVENTION DESCRIPTION      Heparin was used for anticoagulation, patient was preloaded with Brilinta, and a 90 cm 8 [place] Islander AL 0.75 guiding catheter was used to intubate the RCA. For contralateral injections/dual injections, a 7 Western Starla XB 3.5 guiding cath was used to intubate the left main. Following diagnostic angiography with dual injections attention turned towards RCA and a workhorse wire was taken with a mamba catheter. There was difficulty with passing the wires into the mid RCA due to angulated RCA ostium as well as several small branches which were preferentially selected with a wire. Ultimately with a Choice PT wire the mid vessel was able to be wired and the microcatheter was able to be advanced distally. Attention then turned towards the acute marginal branch and this was wired ultimately with a Choice PT wire. There was difficulty with wiring this due to tortuosity. Ultimately the vessel was also treated with angioplasty with 1.5 as well as 2 mm balloons including cutting balloons. IVUS was performed however there was calcification which made difficult to IVUS this vessel fully. IVUS guided puncture was attempted of the proximal  Of this however this was noted be difficult and as such, the IVUS was removed from the acute marginal artery. Attention then turned towards the RCA  proximal And this was able to be crossed with a microcatheter with a Mongeau wire. As a subintimal space was entered it was felt that alternate wiring technique may be helpful and a Clearance Kappa second order wire was taken but subintimal space was entered.   As such, procedure was then converted to attempted ADR and the guidewire was removed in favor of a [employer] Brothers 12 wire and with that the stingray balloon was able to be advanced distally. With that, it was noted that there was blood return with a straw technique and it was felt that the true lumen may have been entered already and as such a workhorse wire was taken and ultimately with a Choice PT wire, the true lumen was entered as this was confirmed by angiography as well as free movement of the wire. However, unsuccessful attempt was made towards wiring the PDA, the wire was only able to enter the PLV. As such the procedure was then converted to a star and a 2.5 mm cutting balloon was used to dilate the mid-distal segments to the crux. Final attempt was made to wire the PDA but this was unsuccessful including with use of a dual-lumen sasuke microcatheter, was felt that procedure was completed at this juncture and patient will need to be allowed to have artery heal over 1 to 2 months with return to Cath Lab thereafter for relook angiography and possible reattempt at  PCI. During procedure, hypotension and patricia were treated with vasopressors, atropine and ivfs and pt responded to that. CONCLUSIONS:      Successful PTA of acute marginal  Successful PTA of RCA  (using STAR technique)     Plan to allow for 1 to 2 months of healing followed by relook angiography and possible PCI.     Admit to hospital for monitoring, BP/HR  Holding bp meds          Labs:   Lab Results   Component Value Date    CREATININE 1.0 03/14/2023    BUN 19 03/14/2023     03/14/2023    K 4.3 03/14/2023     03/14/2023    CO2 20 (L) 03/14/2023      Lab Results   Component Value Date    WBC 14.5 (H) 03/14/2023    HGB 13.4 (L) 03/14/2023    HCT 41.1 03/14/2023    MCV 87.6 03/14/2023     03/14/2023      Lab Results   Component Value Date    INR 1.37 (H) 02/21/2021    PROTIME 16.0 (H) 02/21/2021    No results found for: BNP    Radiology: as above    Treatments: as above    Disposition: home    Patient Instructions:       Dc meds, see med rec    Activity: no heavy lifting for 1 week  Diet: cardiac diet  Wound Care: keep cath site dry, ok to shower    Keep track of bp/hr, let us know of any abnormal numbers     Follow-up with [facility] in 4 week. Get f/u labs in one week, bmp, cbc. Plan for staged cath/angio/pci in 4-8 wks.      Signed:  Melanie Foote MD, 3/14/2023, 8:29 AM

## 2023-03-15 ENCOUNTER — CLINICAL DOCUMENTATION ONLY (OUTPATIENT)
Facility: CLINIC | Age: 68
End: 2023-03-15

## 2023-03-20 ENCOUNTER — TELEPHONE (OUTPATIENT)
Dept: CARDIOLOGY CLINIC | Age: 68
End: 2023-03-20

## 2023-03-20 NOTE — TELEPHONE ENCOUNTER
Previous dr. Prema Olson message  Note      ----- Message from Todd Wu MD sent at 3/14/2023 12:56 PM EDT -----  Let pt know, will need to repeat bmp and cbc either at end of this week or early next week. Also, lets make sure pt restarted xarelto and is also taking baby aspirin daily and brilinta 90 bid. Lets also set pt up for a f/u cath w/ possible rca  pci with anaesthesia in about 6 weeks. Thank you.

## 2023-03-20 NOTE — TELEPHONE ENCOUNTER
Pt stated that he has an upcoming ov on 03/30 with npdd. Pt would like to know if that appt is needed since srj would like to do a heart cath? Pt also stated that his wife has an upcoming ov with npdd on 03/29 and pt stated that srj advised him that they could speak with him at that ov as well if needed?

## 2023-03-21 NOTE — TELEPHONE ENCOUNTER
Yes, he should be seen after recent procedure for follow up and plan for next procedure. He also needs blood work (CBC and BMP) - soon, this week. I am happy to see the two of them and have 2 openings together on 3/31/23. Dr. Silvester Castleman is in office on 3/31/23 and may be able to stop in at that time.      Amber Almeida, APRSD - CNP

## 2023-03-22 NOTE — TELEPHONE ENCOUNTER
03/22/23-Called & spoke with pt. Pt advised to have blood work completed.  Pt also rescheduled to 03/31/23 @1:15pm.

## 2023-03-28 PROCEDURE — 85347 COAGULATION TIME ACTIVATED: CPT

## 2023-03-29 ENCOUNTER — HOSPITAL ENCOUNTER (OUTPATIENT)
Age: 68
Discharge: HOME | End: 2023-03-29
Payer: MEDICARE

## 2023-03-29 DIAGNOSIS — Z98.890 STATUS POST CARDIAC CATHETERIZATION: ICD-10-CM

## 2023-03-29 LAB
ANION GAP SERPL CALCULATED.3IONS-SCNC: 16 MMOL/L (ref 3–16)
BASOPHILS # BLD: 0 K/UL (ref 0–0.2)
BASOPHILS NFR BLD: 0.7 %
BUN SERPL-MCNC: 19 MG/DL (ref 7–20)
CALCIUM SERPL-MCNC: 9.3 MG/DL (ref 8.3–10.6)
CHLORIDE SERPL-SCNC: 106 MMOL/L (ref 99–110)
CO2 SERPL-SCNC: 24 MMOL/L (ref 21–32)
CREAT SERPL-MCNC: 1 MG/DL (ref 0.8–1.3)
DEPRECATED RDW RBC AUTO: 14.3 % (ref 12.4–15.4)
EOSINOPHIL # BLD: 0.1 K/UL (ref 0–0.6)
EOSINOPHIL NFR BLD: 2 %
GFR SERPLBLD CREATININE-BSD FMLA CKD-EPI: >60 ML/MIN/{1.73_M2}
GLUCOSE SERPL-MCNC: 123 MG/DL (ref 70–99)
HCT VFR BLD AUTO: 43.3 % (ref 40.5–52.5)
HGB BLD-MCNC: 14.4 G/DL (ref 13.5–17.5)
LYMPHOCYTES # BLD: 1.1 K/UL (ref 1–5.1)
LYMPHOCYTES NFR BLD: 22.6 %
MCH RBC QN AUTO: 29 PG (ref 26–34)
MCHC RBC AUTO-ENTMCNC: 33.4 G/DL (ref 31–36)
MCV RBC AUTO: 86.8 FL (ref 80–100)
MONOCYTES # BLD: 0.5 K/UL (ref 0–1.3)
MONOCYTES NFR BLD: 9.8 %
NEUTROPHILS # BLD: 3.2 K/UL (ref 1.7–7.7)
NEUTROPHILS NFR BLD: 64.9 %
PLATELET # BLD AUTO: 179 K/UL (ref 135–450)
PMV BLD AUTO: 8.2 FL (ref 5–10.5)
POTASSIUM SERPL-SCNC: 4.3 MMOL/L (ref 3.5–5.1)
RBC # BLD AUTO: 4.99 M/UL (ref 4.2–5.9)
SODIUM SERPL-SCNC: 146 MMOL/L (ref 136–145)
WBC # BLD AUTO: 5 K/UL (ref 4–11)

## 2023-03-29 PROCEDURE — 36415 COLL VENOUS BLD VENIPUNCTURE: CPT

## 2023-03-29 PROCEDURE — 85025 COMPLETE CBC W/AUTO DIFF WBC: CPT

## 2023-03-29 PROCEDURE — 80048 BASIC METABOLIC PNL TOTAL CA: CPT

## 2023-03-30 LAB
POC ACT LR: 256 SEC
POC ACT LR: 295 SEC
POC ACT LR: 304 SEC
POC ACT LR: 312 SEC
POC ACT LR: 318 SEC
POC ACT LR: 329 SEC
POC ACT LR: 336 SEC
POC ACT LR: 343 SEC
POC ACT LR: 347 SEC
POC ACT LR: 352 SEC
POC ACT LR: 360 SEC
POC ACT LR: >400 SEC

## 2023-03-31 ENCOUNTER — OFFICE VISIT (OUTPATIENT)
Dept: CARDIOLOGY CLINIC | Age: 68
End: 2023-03-31
Payer: MEDICARE

## 2023-03-31 ENCOUNTER — TELEPHONE (OUTPATIENT)
Dept: CARDIOLOGY CLINIC | Age: 68
End: 2023-03-31

## 2023-03-31 VITALS
WEIGHT: 188.5 LBS | DIASTOLIC BLOOD PRESSURE: 78 MMHG | HEIGHT: 71 IN | BODY MASS INDEX: 26.39 KG/M2 | HEART RATE: 58 BPM | SYSTOLIC BLOOD PRESSURE: 130 MMHG | OXYGEN SATURATION: 98 %

## 2023-03-31 DIAGNOSIS — I25.10 CORONARY ARTERY DISEASE INVOLVING NATIVE CORONARY ARTERY OF NATIVE HEART WITHOUT ANGINA PECTORIS: ICD-10-CM

## 2023-03-31 DIAGNOSIS — N18.31 TYPE 2 DIABETES MELLITUS WITH STAGE 3A CHRONIC KIDNEY DISEASE, WITHOUT LONG-TERM CURRENT USE OF INSULIN (HCC): ICD-10-CM

## 2023-03-31 DIAGNOSIS — K55.069 MESENTERIC VEIN THROMBOSIS (HCC): Chronic | ICD-10-CM

## 2023-03-31 DIAGNOSIS — Z09 HOSPITAL DISCHARGE FOLLOW-UP: ICD-10-CM

## 2023-03-31 DIAGNOSIS — N18.31 STAGE 3A CHRONIC KIDNEY DISEASE (HCC): ICD-10-CM

## 2023-03-31 DIAGNOSIS — E11.22 TYPE 2 DIABETES MELLITUS WITH STAGE 3A CHRONIC KIDNEY DISEASE, WITHOUT LONG-TERM CURRENT USE OF INSULIN (HCC): ICD-10-CM

## 2023-03-31 DIAGNOSIS — E78.2 MIXED HYPERLIPIDEMIA: Primary | ICD-10-CM

## 2023-03-31 PROCEDURE — 3046F HEMOGLOBIN A1C LEVEL >9.0%: CPT | Performed by: NURSE PRACTITIONER

## 2023-03-31 PROCEDURE — 3078F DIAST BP <80 MM HG: CPT | Performed by: NURSE PRACTITIONER

## 2023-03-31 PROCEDURE — G8427 DOCREV CUR MEDS BY ELIG CLIN: HCPCS | Performed by: NURSE PRACTITIONER

## 2023-03-31 PROCEDURE — 1111F DSCHRG MED/CURRENT MED MERGE: CPT | Performed by: NURSE PRACTITIONER

## 2023-03-31 PROCEDURE — 2022F DILAT RTA XM EVC RTNOPTHY: CPT | Performed by: NURSE PRACTITIONER

## 2023-03-31 PROCEDURE — G8417 CALC BMI ABV UP PARAM F/U: HCPCS | Performed by: NURSE PRACTITIONER

## 2023-03-31 PROCEDURE — G8484 FLU IMMUNIZE NO ADMIN: HCPCS | Performed by: NURSE PRACTITIONER

## 2023-03-31 PROCEDURE — 3017F COLORECTAL CA SCREEN DOC REV: CPT | Performed by: NURSE PRACTITIONER

## 2023-03-31 PROCEDURE — 99214 OFFICE O/P EST MOD 30 MIN: CPT | Performed by: NURSE PRACTITIONER

## 2023-03-31 PROCEDURE — 3074F SYST BP LT 130 MM HG: CPT | Performed by: NURSE PRACTITIONER

## 2023-03-31 PROCEDURE — 1123F ACP DISCUSS/DSCN MKR DOCD: CPT | Performed by: NURSE PRACTITIONER

## 2023-03-31 PROCEDURE — 1036F TOBACCO NON-USER: CPT | Performed by: NURSE PRACTITIONER

## 2023-03-31 NOTE — PATIENT INSTRUCTIONS
Will send note to Dr. Carolyn Peoples regarding lower dose of Xarelto given on aspirin and Brilinta  Continue the aspirin 81 mg daily and Brilinta 90 mg twice daily  Continue the amlodipine 10 mg, half tablet once daily and carvedilol (Coreg) 3.125 mg twice daily with food  Plan repeat left heart catheterization with Dr. Ekta Almanza in about 6 weeks (1st or 2nd week of May)  Keep follow up appointments with me and Dr. Ekta Almanza depending of timing of procedure

## 2023-03-31 NOTE — TELEPHONE ENCOUNTER
----- Message from Riri Florez MD sent at 3/30/2023  7:27 AM EDT -----  Let patient know their lab test is similar/stable as compared to prior values, continue current meds, no new orders or changes at this time. Thanks.

## 2023-03-31 NOTE — Clinical Note
Tyra, please arrange for relook and possible PCI to RCA with SRJ during the first or second week of May.   Thank you, Debria Curling

## 2023-03-31 NOTE — PROGRESS NOTES
Historical Provider, MD   carvedilol (COREG) 3.125 MG tablet Take 1 tablet by mouth 2 times daily (with meals) 5/4/22  Yes Giovana Thompson MD        Allergies:  Patient has no known allergies. Physical Examination:    Vitals:    03/31/23 1311   BP: 130/78   Pulse: 58   SpO2: 98%   Weight: 188 lb 8 oz (85.5 kg)   Height: 5' 11\" (1.803 m)     Constitutional and General Appearance: Warm and dry, no apparent distress, normal coloration  HEENT:  Normocephalic, atraumatic  Respiratory:  Normal excursion and expansion without use of accessory muscles  Resp Auscultation: Clear to auscultation   Cardiovascular: The apical impulses not displaced  Heart tones are crisp and normal  JVP 8 cm H2O  Regular rate and rhythm, normal S1S2, no m/g/r  Peripheral pulses are symmetrical and full  There is no clubbing, cyanosis of the extremities.   No BLE edema  Pedal Pulses: 2+ and equal   Abdomen:  No masses or tenderness  Liver/Spleen: No Abnormalities Noted  Neurological/Psychiatric:  Alert and oriented in all spheres  Moves all extremities well  Exhibits normal gait balance and coordination  No abnormalities of mood, affect, memory, mentation, or behavior are noted    Lab Data:  Most recent lab results below reviewed in office    CBC:   Lab Results   Component Value Date/Time    WBC 5.0 03/29/2023 12:01 PM    WBC 14.5 03/14/2023 04:24 AM    WBC 6.4 03/13/2023 07:00 AM    RBC 4.99 03/29/2023 12:01 PM    RBC 4.69 03/14/2023 04:24 AM    RBC 5.38 03/13/2023 07:00 AM    HGB 14.4 03/29/2023 12:01 PM    HGB 13.4 03/14/2023 04:24 AM    HGB 15.5 03/13/2023 07:00 AM    HCT 43.3 03/29/2023 12:01 PM    HCT 41.1 03/14/2023 04:24 AM    HCT 46.4 03/13/2023 07:00 AM    MCV 86.8 03/29/2023 12:01 PM    MCV 87.6 03/14/2023 04:24 AM    MCV 86.2 03/13/2023 07:00 AM    RDW 14.3 03/29/2023 12:01 PM    RDW 14.2 03/14/2023 04:24 AM    RDW 14.3 03/13/2023 07:00 AM     03/29/2023 12:01 PM     03/14/2023 04:24 AM     03/13/2023 07:00 AM

## 2023-05-15 ENCOUNTER — ANESTHESIA EVENT (OUTPATIENT)
Dept: CARDIAC CATH/INVASIVE PROCEDURES | Age: 68
End: 2023-05-15
Payer: MEDICARE

## 2023-05-16 ENCOUNTER — ANESTHESIA (OUTPATIENT)
Dept: CARDIAC CATH/INVASIVE PROCEDURES | Age: 68
End: 2023-05-16
Payer: MEDICARE

## 2023-05-16 ENCOUNTER — HOSPITAL ENCOUNTER (OUTPATIENT)
Dept: CARDIAC CATH/INVASIVE PROCEDURES | Age: 68
Discharge: HOME | End: 2023-05-16
Attending: INTERNAL MEDICINE | Admitting: INTERNAL MEDICINE
Payer: MEDICARE

## 2023-05-16 ENCOUNTER — TELEPHONE (OUTPATIENT)
Dept: CARDIOLOGY CLINIC | Age: 68
End: 2023-05-16

## 2023-05-16 VITALS — HEIGHT: 71 IN | BODY MASS INDEX: 26.35 KG/M2 | WEIGHT: 188.2 LBS

## 2023-05-16 LAB
ANION GAP SERPL CALCULATED.3IONS-SCNC: 13 MMOL/L (ref 3–16)
BUN SERPL-MCNC: 20 MG/DL (ref 7–20)
CALCIUM SERPL-MCNC: 8.8 MG/DL (ref 8.3–10.6)
CHLORIDE SERPL-SCNC: 106 MMOL/L (ref 99–110)
CHOLEST SERPL-MCNC: 90 MG/DL (ref 0–199)
CO2 SERPL-SCNC: 23 MMOL/L (ref 21–32)
CREAT SERPL-MCNC: 1.1 MG/DL (ref 0.8–1.3)
DEPRECATED RDW RBC AUTO: 14.3 % (ref 12.4–15.4)
EKG ATRIAL RATE: 49 BPM
EKG DIAGNOSIS: NORMAL
EKG P AXIS: 36 DEGREES
EKG P-R INTERVAL: 148 MS
EKG Q-T INTERVAL: 478 MS
EKG QRS DURATION: 86 MS
EKG QTC CALCULATION (BAZETT): 431 MS
EKG R AXIS: 38 DEGREES
EKG T AXIS: 59 DEGREES
EKG VENTRICULAR RATE: 49 BPM
GFR SERPLBLD CREATININE-BSD FMLA CKD-EPI: >60 ML/MIN/{1.73_M2}
GLUCOSE SERPL-MCNC: 121 MG/DL (ref 70–99)
HCT VFR BLD AUTO: 46.4 % (ref 40.5–52.5)
HDLC SERPL-MCNC: 38 MG/DL (ref 40–60)
HGB BLD-MCNC: 15.4 G/DL (ref 13.5–17.5)
LDLC SERPL CALC-MCNC: 33 MG/DL
LV EF: 60 %
LVEF MODALITY: NORMAL
MCH RBC QN AUTO: 28.6 PG (ref 26–34)
MCHC RBC AUTO-ENTMCNC: 33.1 G/DL (ref 31–36)
MCV RBC AUTO: 86.4 FL (ref 80–100)
PLATELET # BLD AUTO: 137 K/UL (ref 135–450)
PMV BLD AUTO: 7.5 FL (ref 5–10.5)
POTASSIUM SERPL-SCNC: 3.8 MMOL/L (ref 3.5–5.1)
RBC # BLD AUTO: 5.37 M/UL (ref 4.2–5.9)
SODIUM SERPL-SCNC: 142 MMOL/L (ref 136–145)
TRIGL SERPL-MCNC: 97 MG/DL (ref 0–150)
VLDLC SERPL CALC-MCNC: 19 MG/DL
WBC # BLD AUTO: 5 K/UL (ref 4–11)

## 2023-05-16 PROCEDURE — 93005 ELECTROCARDIOGRAM TRACING: CPT | Performed by: INTERNAL MEDICINE

## 2023-05-16 PROCEDURE — 85027 COMPLETE CBC AUTOMATED: CPT

## 2023-05-16 PROCEDURE — 6370000000 HC RX 637 (ALT 250 FOR IP)

## 2023-05-16 PROCEDURE — 2500000003 HC RX 250 WO HCPCS

## 2023-05-16 PROCEDURE — 2580000003 HC RX 258

## 2023-05-16 PROCEDURE — 99152 MOD SED SAME PHYS/QHP 5/>YRS: CPT | Performed by: INTERNAL MEDICINE

## 2023-05-16 PROCEDURE — C1894 INTRO/SHEATH, NON-LASER: HCPCS

## 2023-05-16 PROCEDURE — 93458 L HRT ARTERY/VENTRICLE ANGIO: CPT

## 2023-05-16 PROCEDURE — 6360000002 HC RX W HCPCS

## 2023-05-16 PROCEDURE — 80048 BASIC METABOLIC PNL TOTAL CA: CPT

## 2023-05-16 PROCEDURE — 2709999900 HC NON-CHARGEABLE SUPPLY

## 2023-05-16 PROCEDURE — C1769 GUIDE WIRE: HCPCS

## 2023-05-16 PROCEDURE — 93458 L HRT ARTERY/VENTRICLE ANGIO: CPT | Performed by: INTERNAL MEDICINE

## 2023-05-16 PROCEDURE — 80061 LIPID PANEL: CPT

## 2023-05-16 RX ORDER — SODIUM CHLORIDE 0.9 % (FLUSH) 0.9 %
5-40 SYRINGE (ML) INJECTION PRN
Status: DISCONTINUED | OUTPATIENT
Start: 2023-05-16 | End: 2023-05-16 | Stop reason: HOSPADM

## 2023-05-16 RX ORDER — SODIUM CHLORIDE 9 MG/ML
INJECTION, SOLUTION INTRAVENOUS PRN
Status: DISCONTINUED | OUTPATIENT
Start: 2023-05-16 | End: 2023-05-16 | Stop reason: HOSPADM

## 2023-05-16 RX ORDER — SODIUM CHLORIDE 0.9 % (FLUSH) 0.9 %
5-40 SYRINGE (ML) INJECTION EVERY 12 HOURS SCHEDULED
Status: DISCONTINUED | OUTPATIENT
Start: 2023-05-16 | End: 2023-05-16 | Stop reason: HOSPADM

## 2023-05-16 RX ORDER — SODIUM CHLORIDE 9 MG/ML
INJECTION, SOLUTION INTRAVENOUS PRN
OUTPATIENT
Start: 2023-05-16

## 2023-05-16 RX ORDER — MEPERIDINE HYDROCHLORIDE 50 MG/ML
12.5 INJECTION INTRAMUSCULAR; INTRAVENOUS; SUBCUTANEOUS EVERY 5 MIN PRN
OUTPATIENT
Start: 2023-05-16

## 2023-05-16 RX ORDER — FAMOTIDINE 10 MG/ML
20 INJECTION, SOLUTION INTRAVENOUS ONCE
Status: COMPLETED | OUTPATIENT
Start: 2023-05-16 | End: 2023-05-16

## 2023-05-16 RX ORDER — SODIUM CHLORIDE, SODIUM LACTATE, POTASSIUM CHLORIDE, CALCIUM CHLORIDE 600; 310; 30; 20 MG/100ML; MG/100ML; MG/100ML; MG/100ML
INJECTION, SOLUTION INTRAVENOUS CONTINUOUS
Status: DISCONTINUED | OUTPATIENT
Start: 2023-05-16 | End: 2023-05-16 | Stop reason: HOSPADM

## 2023-05-16 RX ORDER — ACETAMINOPHEN 325 MG/1
650 TABLET ORAL EVERY 4 HOURS PRN
OUTPATIENT
Start: 2023-05-16

## 2023-05-16 RX ORDER — OXYCODONE HYDROCHLORIDE 5 MG/1
5 TABLET ORAL PRN
OUTPATIENT
Start: 2023-05-16 | End: 2023-05-16

## 2023-05-16 RX ORDER — DIPHENHYDRAMINE HYDROCHLORIDE 50 MG/ML
12.5 INJECTION INTRAMUSCULAR; INTRAVENOUS
OUTPATIENT
Start: 2023-05-16 | End: 2023-05-17

## 2023-05-16 RX ORDER — SODIUM CHLORIDE 0.9 % (FLUSH) 0.9 %
5-40 SYRINGE (ML) INJECTION PRN
OUTPATIENT
Start: 2023-05-16

## 2023-05-16 RX ORDER — ASPIRIN 81 MG/1
81 TABLET, CHEWABLE ORAL DAILY
Status: DISCONTINUED | OUTPATIENT
Start: 2023-05-16 | End: 2023-05-16 | Stop reason: HOSPADM

## 2023-05-16 RX ORDER — LABETALOL HYDROCHLORIDE 5 MG/ML
5 INJECTION, SOLUTION INTRAVENOUS EVERY 10 MIN PRN
OUTPATIENT
Start: 2023-05-16

## 2023-05-16 RX ORDER — LORAZEPAM 0.5 MG/1
0.5 TABLET ORAL
Status: DISCONTINUED | OUTPATIENT
Start: 2023-05-16 | End: 2023-05-16 | Stop reason: HOSPADM

## 2023-05-16 RX ORDER — SODIUM CHLORIDE 0.9 % (FLUSH) 0.9 %
5-40 SYRINGE (ML) INJECTION EVERY 12 HOURS SCHEDULED
OUTPATIENT
Start: 2023-05-16

## 2023-05-16 RX ORDER — ONDANSETRON 2 MG/ML
4 INJECTION INTRAMUSCULAR; INTRAVENOUS EVERY 6 HOURS PRN
Status: DISCONTINUED | OUTPATIENT
Start: 2023-05-16 | End: 2023-05-16 | Stop reason: HOSPADM

## 2023-05-16 RX ORDER — FAMOTIDINE 10 MG/ML
20 INJECTION, SOLUTION INTRAVENOUS ONCE
Status: DISCONTINUED | OUTPATIENT
Start: 2023-05-16 | End: 2023-05-16 | Stop reason: HOSPADM

## 2023-05-16 RX ORDER — OXYCODONE HYDROCHLORIDE 5 MG/1
10 TABLET ORAL PRN
OUTPATIENT
Start: 2023-05-16 | End: 2023-05-16

## 2023-05-16 RX ORDER — HEPARIN SODIUM 1000 [USP'U]/ML
INJECTION, SOLUTION INTRAVENOUS; SUBCUTANEOUS
Status: COMPLETED | OUTPATIENT
Start: 2023-05-16 | End: 2023-05-16

## 2023-05-16 RX ORDER — MIDAZOLAM HYDROCHLORIDE 1 MG/ML
INJECTION INTRAMUSCULAR; INTRAVENOUS
Status: COMPLETED | OUTPATIENT
Start: 2023-05-16 | End: 2023-05-16

## 2023-05-16 RX ORDER — ONDANSETRON 2 MG/ML
4 INJECTION INTRAMUSCULAR; INTRAVENOUS
OUTPATIENT
Start: 2023-05-16

## 2023-05-16 RX ORDER — ASPIRIN 325 MG
325 TABLET ORAL ONCE
Status: DISCONTINUED | OUTPATIENT
Start: 2023-05-16 | End: 2023-05-16 | Stop reason: HOSPADM

## 2023-05-16 RX ORDER — FENTANYL CITRATE 50 UG/ML
INJECTION, SOLUTION INTRAMUSCULAR; INTRAVENOUS
Status: COMPLETED | OUTPATIENT
Start: 2023-05-16 | End: 2023-05-16

## 2023-05-16 RX ADMIN — HEPARIN SODIUM 3000 UNITS: 1000 INJECTION, SOLUTION INTRAVENOUS; SUBCUTANEOUS at 09:33

## 2023-05-16 RX ADMIN — FAMOTIDINE 20 MG: 10 INJECTION, SOLUTION INTRAVENOUS at 08:08

## 2023-05-16 RX ADMIN — MIDAZOLAM HYDROCHLORIDE 2 MG: 1 INJECTION INTRAMUSCULAR; INTRAVENOUS at 08:47

## 2023-05-16 RX ADMIN — ASPIRIN 81 MG: 81 TABLET, CHEWABLE ORAL at 07:22

## 2023-05-16 RX ADMIN — FENTANYL CITRATE 50 MCG: 50 INJECTION, SOLUTION INTRAMUSCULAR; INTRAVENOUS at 08:46

## 2023-05-16 NOTE — DISCHARGE INSTRUCTIONS
FOLLOW-UP APPOINTMENTS    [facility] OFFICE - Keep appointment on May 24th at 1:30pm with Michelle Urrutia CNP, [address]. [facility],  [facility], [address], [address] [facility], [address] [facility]. Office #: [phone]. If you are unable to make this appointment, please call to reschedule. Directions to [place] [address] towards [place]. [address] exit. Right off exit. Cross over [employer]. Right on [address] Left into hospital. Follow the signs to the emergency room ( turn left toward the Emergency room). Go right at the first stop sign. Just past the Emergency room at the second stop sign turn right and go up the ramp and park on the top level if possible. Go in the glass doors of the [facility] on the top level of the garage [address]. As soon as you get in the door turn left and our office is the one with the glass doors. Cath Labs at  [facility]   Discharge Instructions        5/16/2023  Alvarado Castillo   Date of Birth 1955       Activity:  No driving for 24 hours. In 24 hours you may remove dressing and shower, wash site gently with soap and water and leave open to air  Avoid submerging your arm in sitting water for 5 days. Do not use your right hand for 24 hours, then  No lifting more than 5 pounds for 5 days. No lotions, powders, or ointments near site for 5 days. No work/school for 5 days unless instructed otherwise by your cardiologist.    Diet:   Resume previous diet, if a cardiac diet is specified you will receive a handout with  general guidelines. Drink extra non-alcoholic/decaffienated fluids for first 24 hours after your procedure.     Arm Management:  If bleeding occurs from the site or a hematoma (lump) begins to increase in size, apply pressure directly over the site, call 911 to return to the hospital.    Special Instructions:  Report any coolness or numbness in the arm  Report any chills, fever, itching, red bumps or rash

## 2023-05-16 NOTE — ANESTHESIA PRE PROCEDURE
Department of Anesthesiology  Preprocedure Note       Name:  Jacquelin Elizondo   Age:  75 y.o.  :  1955                                          MRN:  [id number]         Date:  2023      Surgeon: * No surgeons listed *    Procedure: * No procedures listed *    Medications prior to admission:   Prior to Admission medications    Medication Sig Start Date End Date Taking?  Authorizing Provider   amLODIPine (NORVASC) 10 MG tablet Take 0.5 tablets by mouth daily 3/14/23   Melanie Foote MD   ticagrvalenciaor [facility]) 90 MG TABS tablet Take 1 tablet by mouth 2 times daily 3/14/23   Melanie Foote MD   aspirin EC 81 MG EC tablet Take 1 tablet by mouth daily 3/14/23   Mleanie Foote, MD   rivaroxaban Iraida Machado) 20 MG TABS tablet Take 1 tablet by mouth daily (with breakfast) 23   Melanie Foote MD   dapagliflozin Cy Carias) 5 MG tablet Take 1 tablet by mouth every morning Starting in 20   Historical Provider, MD   atorvastatin (LIPITOR) 40 MG tablet Take 1 tablet by mouth nightly 10/17/22   Historical Provider, MD   carvedilol (COREG) 3.125 MG tablet Take 1 tablet by mouth 2 times daily (with meals) 22   Melanie Foote MD       Current medications:    Current Facility-Administered Medications   Medication Dose Route Frequency Provider Last Rate Last Admin    famotidine (PEPCID) injection 20 mg  20 mg IntraVENous Once Stephy Ross MD        lactated ringers IV soln infusion   IntraVENous Continuous Stephy Ross MD        sodium chloride flush 0.9 % injection 5-40 mL  5-40 mL IntraVENous 2 times per day Stephy Ross MD        sodium chloride flush 0.9 % injection 5-40 mL  5-40 mL IntraVENous PRN Stephy Ross MD        0.9 % sodium chloride infusion   IntraVENous PRN Stephy Ross MD        sodium chloride flush 0.9 % injection 5-40 mL  5-40 mL IntraVENous 2 times per day Melanie Foote MD        sodium chloride flush 0.9 % injection 5-40

## 2023-05-16 NOTE — H&P
Brief Pre-Op Note/Sedation Assessment      Bryson Mendez  1955  [id number]  8:45 AM    Planned Procedure: Cardiac Catheterization Procedure  Post Procedure Plan: Return to same level of care  Consent: I have discussed with the patient and/or the patient representative the indication, alternatives, and the possible risks and/or complications of the planned procedure and the anesthesia methods. The patient and/or patient representative appear to understand and agree to proceed. DISCUSSION OF CARDIAC CATHETERIZATION PROCEDURES: The procedures, indications, risks and alternatives have been discussed with the patient and, as appropriate, with the patient's guardian . Risks discussed included, but are not limited to, bleeding, development of blood clots/emboli, damage to blood vessels, renal failure, malignant cardiac arrhythmias, stroke, heart attack, emergent coronary bypass surgery, death, dye allergy. The patient (and guardian as appropriate) expressed understanding of the aforementioned and wished to proceed. Pt understands this is a higher risk procedure. I d/w different approaches including with or without anesthesia. We also discussed that due to supply chain staffing and ordering issues, we are limited somewhat on equipment and he may need to consider rescheduling. I discussed with him options to reschedule or at least for moving forward with diagnostic cath and he would prefer to go ahead with diagnostic cath rather than rescheduling. He understands that diagnostic cath may mean that he needs additional follow-up cath and we may be limited today with what we can do. Having heard this in understanding this and having understood risk/benefits/alternatives/outcomes, patient wishes to proceed at least with diagnostic cath and possible PCI.   I offered to call his wife/family, he declined that as he felt they would worry too much and as such I did not call his family to provide updates on these

## 2023-05-16 NOTE — PROCEDURES
CARDIAC CATHETERIZATION REPORT     Procedure Date:  2023  Patient Name: Isidoro Braswell  MRN: [id number] : 1955      INDICATION     Abnl stress test  RCA     PROCEDURES PERFORMED     Left heart catheterization  LVgram  Coronary angiogam  Coronary cath  Monitoring of moderate conscious sedation        PROCEDURE DESCRIPTION   Risks/benefits/alternatives/outcomes were discussed with patient and/or family and informed consent was obtained. Using the [facility] scale, the patient's right radial artery was found to be a level B. Patient was prepped draped in the usual sterile fashion. Local anaesthetic was applied over puncture site. Using a back wall technique, a 6 Yi Terumo sheath was inserted into right radial artery. Verapamil, nitroglycerin, cardene were administered through the sheath. Heparin was administered. Diagnostic 5fr pigtail, ultra catheters were used for diagnostic angiograms. At the conclusion of the procedure, a TR band was placed over the puncture site and hemostasis was obtained. There were no immediate complications. I supervised sedation from 9:17am to 9:46am with versed 2mg/fentanyl 50mcg during the procedure. An independent trained observer pushed meds at my direction. We monitored the patient's level of consciousness and vital signs/physiologic status throughout the procedure duration (see times listed previously). 115cc contrast was utilized. <20cc EBL. D/w pt's wife by phone. FINDINGS             LVGRAM    LVEDP 9   GRADIENT ACROSS AORTIC VALVE None   LV FUNCTION EF 60%   WALL MOTION Normal   MITRAL REGURGITATION Mild             CORONARY ARTERIES    Similar anatomy as noted on prior caths including diag cath and then since RCA  PTA, there has been reocclusion of RCA and with good L-R collaterals.     ARTERIES     LM Less than 10% fhaogydn-xfc-hmwuhv stenosis            LAD Moderately calcified proximal-mid 40-50% stenosis, distal 60 to 75%

## 2023-05-22 NOTE — TELEPHONE ENCOUNTER
Called and spoke with pt. He stated that he is taking Brilinta, but not Xarelto. Stated he was instructed by Dr. Sourav Vallejo with hematology last week that he does not need to take [facility] since he is taking Brilinta and that taking Brilinta and Xarelto together is too much blood thinner.

## 2023-05-22 NOTE — TELEPHONE ENCOUNTER
[id number] [address]ley, as long as he checked with heme/onc, then that's ok with me, so we are good to go, let him know it would be a good idea to get a f/u appt w/ hematology to further assess this. Thank you.

## 2023-05-22 NOTE — TELEPHONE ENCOUNTER
Lets remind pt to resume blood thinners that he has been taking before cath, he should be back on xarelto. Thank you.

## 2023-05-23 NOTE — TELEPHONE ENCOUNTER
Called pt. Relayed [facility] message to pt, he stated he does have f/u with hematology scheduled for October 2023.

## 2023-05-23 NOTE — PROGRESS NOTES
pci w/ stingray and retrograde approach pending recovery from this procedure             Cardiac catheterization/percutaneous intervention 3/13/2023:  PROCEDURES PERFORMED   Coronary angiogam  Coronary cath     Temporary TV pacer insertion  IVUS of RCA  PTA of RCA  PTA of acute marginal artery   PERCUTANEOUS INTERVENTION DESCRIPTION      Heparin was used for anticoagulation, patient was preloaded with Brilinta, and a 90 cm 8 Telugu AL 0.75 guiding catheter was used to intubate the RCA. For contralateral injections/dual injections, a 7 Western Starla XB 3.5 guiding cath was used to intubate the left main. Following diagnostic angiography with dual injections attention turned towards RCA and a workhorse wire was taken with a mamba catheter. There was difficulty with passing the wires into the mid RCA due to angulated RCA ostium as well as several small branches which were preferentially selected with a wire. Ultimately with a Choice PT wire the mid vessel was able to be wired and the microcatheter was able to be advanced distally. Attention then turned towards the acute marginal branch and this was wired ultimately with a Choice PT wire. There was difficulty with wiring this due to tortuosity. Ultimately the vessel was also treated with angioplasty with 1.5 as well as 2 mm balloons including cutting balloons. IVUS was performed however there was calcification which made difficult to IVUS this vessel fully. IVUS guided puncture was attempted of the proximal  Of this however this was noted be difficult and as such, the IVUS was removed from the acute marginal artery. Attention then turned towards the RCA  proximal And this was able to be crossed with a microcatheter with a Mongeau wire. As a subintimal space was entered it was felt that alternate wiring technique may be helpful and a Hai Du second order wire was taken but subintimal space was entered.   As such, procedure was then converted to attempted

## 2023-05-24 ENCOUNTER — OFFICE VISIT (OUTPATIENT)
Dept: CARDIOLOGY CLINIC | Age: 68
End: 2023-05-24
Payer: MEDICARE

## 2023-05-24 VITALS
HEART RATE: 56 BPM | HEIGHT: 71 IN | OXYGEN SATURATION: 98 % | SYSTOLIC BLOOD PRESSURE: 156 MMHG | BODY MASS INDEX: 26.25 KG/M2 | DIASTOLIC BLOOD PRESSURE: 60 MMHG | WEIGHT: 187.5 LBS

## 2023-05-24 DIAGNOSIS — I25.10 CORONARY ARTERY DISEASE INVOLVING NATIVE CORONARY ARTERY OF NATIVE HEART WITHOUT ANGINA PECTORIS: Primary | ICD-10-CM

## 2023-05-24 DIAGNOSIS — E11.22 TYPE 2 DIABETES MELLITUS WITH STAGE 3A CHRONIC KIDNEY DISEASE, WITHOUT LONG-TERM CURRENT USE OF INSULIN (HCC): ICD-10-CM

## 2023-05-24 DIAGNOSIS — E78.2 MIXED HYPERLIPIDEMIA: ICD-10-CM

## 2023-05-24 DIAGNOSIS — N18.31 STAGE 3A CHRONIC KIDNEY DISEASE (HCC): ICD-10-CM

## 2023-05-24 DIAGNOSIS — K55.069 MESENTERIC VEIN THROMBOSIS (HCC): ICD-10-CM

## 2023-05-24 DIAGNOSIS — N18.31 TYPE 2 DIABETES MELLITUS WITH STAGE 3A CHRONIC KIDNEY DISEASE, WITHOUT LONG-TERM CURRENT USE OF INSULIN (HCC): ICD-10-CM

## 2023-05-24 DIAGNOSIS — I10 ESSENTIAL HYPERTENSION: ICD-10-CM

## 2023-05-24 PROCEDURE — 1036F TOBACCO NON-USER: CPT | Performed by: NURSE PRACTITIONER

## 2023-05-24 PROCEDURE — 1123F ACP DISCUSS/DSCN MKR DOCD: CPT | Performed by: NURSE PRACTITIONER

## 2023-05-24 PROCEDURE — G8417 CALC BMI ABV UP PARAM F/U: HCPCS | Performed by: NURSE PRACTITIONER

## 2023-05-24 PROCEDURE — 3017F COLORECTAL CA SCREEN DOC REV: CPT | Performed by: NURSE PRACTITIONER

## 2023-05-24 PROCEDURE — 3078F DIAST BP <80 MM HG: CPT | Performed by: NURSE PRACTITIONER

## 2023-05-24 PROCEDURE — G8427 DOCREV CUR MEDS BY ELIG CLIN: HCPCS | Performed by: NURSE PRACTITIONER

## 2023-05-24 PROCEDURE — 2022F DILAT RTA XM EVC RTNOPTHY: CPT | Performed by: NURSE PRACTITIONER

## 2023-05-24 PROCEDURE — 99213 OFFICE O/P EST LOW 20 MIN: CPT | Performed by: NURSE PRACTITIONER

## 2023-05-24 PROCEDURE — 3046F HEMOGLOBIN A1C LEVEL >9.0%: CPT | Performed by: NURSE PRACTITIONER

## 2023-05-24 PROCEDURE — 3077F SYST BP >= 140 MM HG: CPT | Performed by: NURSE PRACTITIONER

## 2023-05-24 NOTE — PATIENT INSTRUCTIONS
No change in current heart medicines  Schedule the percutaneous coronary intervention with Dr. Tashi Lozano in about 1 week or so  He will plan to use anesthesia for the next procedure  Hold the Paul Rojo for 48 hours prior to the procedure  Will have Tyra arrange a follow up with me after the procedure

## 2023-07-25 ENCOUNTER — TELEPHONE (OUTPATIENT)
Dept: CARDIOLOGY CLINIC | Age: 68
End: 2023-07-25

## 2023-07-25 NOTE — TELEPHONE ENCOUNTER
CARDIAC CLEARANCE REQUEST    What type of procedure are you having:  Lower spine injection   Are you taking any blood thinners:  Brilinta   Type on anesthesia:    When is your procedure scheduled for:  08.02.23  What physician is performing your procedure:  Dr. Eugene Farnco   Phone Number:  [phone]  Fax number to send the letter:       Pt needs to know how long to hold Brilinta for

## 2023-07-26 NOTE — TELEPHONE ENCOUNTER
Okay to hold the Brilinta for 5-7 days for procedure.      Per Revised Cardiac Risk Index Arleta Bread Criteria):   Estimated Risk of Adverse Outcome with Non-cardiac Surgery: VERY LOW  Estimated Rate of Myocardial Infarction, Pulmonary Edema, Ventricular Fibrillation, Cardiac Arrest, or Complete Heart Block:  0.4%    JOLANTA Taylor - CNP, 7/26/2023, 12:11 PM

## 2023-08-01 ENCOUNTER — HOSPITAL ENCOUNTER (OUTPATIENT)
Age: 68
Discharge: HOME | End: 2023-08-01
Payer: MEDICARE

## 2023-08-01 DIAGNOSIS — R60.0 LOWER LEG EDEMA: ICD-10-CM

## 2023-08-01 LAB
ALBUMIN SERPL-MCNC: 4.4 G/DL (ref 3.4–5)
ANION GAP SERPL CALCULATED.3IONS-SCNC: 15 MMOL/L (ref 3–16)
BUN SERPL-MCNC: 19 MG/DL (ref 7–20)
CALCIUM SERPL-MCNC: 9.6 MG/DL (ref 8.3–10.6)
CHLORIDE SERPL-SCNC: 103 MMOL/L (ref 99–110)
CO2 SERPL-SCNC: 23 MMOL/L (ref 21–32)
CREAT SERPL-MCNC: 1 MG/DL (ref 0.8–1.3)
CREAT UR-MCNC: 90.1 MG/DL (ref 39–259)
GFR SERPLBLD CREATININE-BSD FMLA CKD-EPI: >60 ML/MIN/{1.73_M2}
GLUCOSE SERPL-MCNC: 136 MG/DL (ref 70–99)
MICROALBUMIN UR DL<=1MG/L-MCNC: <1.2 MG/DL
MICROALBUMIN/CREAT UR: NORMAL MG/G (ref 0–30)
PHOSPHATE SERPL-MCNC: 3.6 MG/DL (ref 2.5–4.9)
POTASSIUM SERPL-SCNC: 5 MMOL/L (ref 3.5–5.1)
SODIUM SERPL-SCNC: 141 MMOL/L (ref 136–145)

## 2023-08-01 PROCEDURE — 82043 UR ALBUMIN QUANTITATIVE: CPT

## 2023-08-01 PROCEDURE — 82570 ASSAY OF URINE CREATININE: CPT

## 2023-08-01 PROCEDURE — 80069 RENAL FUNCTION PANEL: CPT

## 2023-08-01 PROCEDURE — 36415 COLL VENOUS BLD VENIPUNCTURE: CPT

## 2023-08-01 RX ORDER — CARVEDILOL 3.12 MG/1
3.12 TABLET ORAL 2 TIMES DAILY WITH MEALS
Qty: 180 TABLET | Refills: 1 | Status: SHIPPED | OUTPATIENT
Start: 2023-08-01

## 2023-08-01 NOTE — TELEPHONE ENCOUNTER
This patient was supposed to scheduled for PCI with SRJ \"Plan on med therapy, consider staged redo rca  pci w/ stingray and retrograde approach pending recovery from this procedure\"   Tyra, can you please contact patient to see if he still wants to schedule this?    Thank you, Chinyere Raya

## 2023-08-14 ENCOUNTER — TELEPHONE (OUTPATIENT)
Dept: CARDIOLOGY | Age: 68
End: 2023-08-14

## 2023-08-14 NOTE — TELEPHONE ENCOUNTER
----- Message from Michael Iqbal MD sent at 8/7/2023  1:11 PM EDT -----  He can undergo angiogram   Hold farxiga day before and he knows that       Michael Iqbal MD

## 2023-08-28 RX ORDER — TICAGRELOR 90 MG/1
TABLET ORAL
Qty: 60 TABLET | Refills: 0 | Status: SHIPPED | OUTPATIENT
Start: 2023-08-28

## 2023-09-05 NOTE — TELEPHONE ENCOUNTER
COLLETTE for pt. Per SRJ he would like pt to see NP (scheduled 9/14) prior to proceeding with scheduling if he is interested in procedure.

## 2023-09-14 ENCOUNTER — OFFICE VISIT (OUTPATIENT)
Dept: CARDIOLOGY CLINIC | Age: 68
End: 2023-09-14
Payer: MEDICARE

## 2023-09-14 VITALS
HEART RATE: 54 BPM | OXYGEN SATURATION: 98 % | SYSTOLIC BLOOD PRESSURE: 140 MMHG | BODY MASS INDEX: 25.97 KG/M2 | WEIGHT: 185.5 LBS | DIASTOLIC BLOOD PRESSURE: 64 MMHG | HEIGHT: 71 IN

## 2023-09-14 DIAGNOSIS — I10 ESSENTIAL HYPERTENSION: ICD-10-CM

## 2023-09-14 DIAGNOSIS — E78.2 MIXED HYPERLIPIDEMIA: ICD-10-CM

## 2023-09-14 DIAGNOSIS — N18.31 STAGE 3A CHRONIC KIDNEY DISEASE (HCC): ICD-10-CM

## 2023-09-14 DIAGNOSIS — E11.22 TYPE 2 DIABETES MELLITUS WITH STAGE 3A CHRONIC KIDNEY DISEASE, WITHOUT LONG-TERM CURRENT USE OF INSULIN (HCC): ICD-10-CM

## 2023-09-14 DIAGNOSIS — I25.10 CORONARY ARTERY DISEASE INVOLVING NATIVE CORONARY ARTERY OF NATIVE HEART WITHOUT ANGINA PECTORIS: Primary | ICD-10-CM

## 2023-09-14 DIAGNOSIS — N18.31 TYPE 2 DIABETES MELLITUS WITH STAGE 3A CHRONIC KIDNEY DISEASE, WITHOUT LONG-TERM CURRENT USE OF INSULIN (HCC): ICD-10-CM

## 2023-09-14 DIAGNOSIS — K55.069 MESENTERIC VEIN THROMBOSIS (HCC): ICD-10-CM

## 2023-09-14 PROCEDURE — 1123F ACP DISCUSS/DSCN MKR DOCD: CPT | Performed by: NURSE PRACTITIONER

## 2023-09-14 PROCEDURE — G8417 CALC BMI ABV UP PARAM F/U: HCPCS | Performed by: NURSE PRACTITIONER

## 2023-09-14 PROCEDURE — 1036F TOBACCO NON-USER: CPT | Performed by: NURSE PRACTITIONER

## 2023-09-14 PROCEDURE — 2022F DILAT RTA XM EVC RTNOPTHY: CPT | Performed by: NURSE PRACTITIONER

## 2023-09-14 PROCEDURE — 99214 OFFICE O/P EST MOD 30 MIN: CPT | Performed by: NURSE PRACTITIONER

## 2023-09-14 PROCEDURE — 3017F COLORECTAL CA SCREEN DOC REV: CPT | Performed by: NURSE PRACTITIONER

## 2023-09-14 PROCEDURE — G8427 DOCREV CUR MEDS BY ELIG CLIN: HCPCS | Performed by: NURSE PRACTITIONER

## 2023-09-14 PROCEDURE — 3046F HEMOGLOBIN A1C LEVEL >9.0%: CPT | Performed by: NURSE PRACTITIONER

## 2023-09-14 PROCEDURE — 3074F SYST BP LT 130 MM HG: CPT | Performed by: NURSE PRACTITIONER

## 2023-09-14 PROCEDURE — 3078F DIAST BP <80 MM HG: CPT | Performed by: NURSE PRACTITIONER

## 2023-09-14 RX ORDER — AMLODIPINE BESYLATE 5 MG/1
5 TABLET ORAL DAILY
Qty: 90 TABLET | Refills: 3 | Status: SHIPPED | OUTPATIENT
Start: 2023-09-14

## 2023-09-14 NOTE — PROGRESS NOTES
Educated patient on discharge instructions and follow up. Will see Dr. Amee Zambrano 3/29. Informed of new medications and changes. Pt is picking up medications at outpatient pharmacy. Both PIVs removed. Patient leaving with wife. Consent (Nose)/Introductory Paragraph: The rationale for Mohs was explained to the patient and consent was obtained. The risks, benefits and alternatives to therapy were discussed in detail. Specifically, the risks of nasal deformity, changes in the flow of air through the nose, infection, scarring, bleeding, prolonged wound healing, incomplete removal, allergy to anesthesia, nerve injury and recurrence were addressed. Prior to the procedure, the treatment site was clearly identified and confirmed by the patient. All components of Universal Protocol/PAUSE Rule completed.

## 2023-09-14 NOTE — PROGRESS NOTES
space was entered. As such, procedure was then converted to attempted ADR and the guidewire was removed in favor of a miracle Brothers 12 wire and with that the stingray balloon was able to be advanced distally. With that, it was noted that there was blood return with a straw technique and it was felt that the true lumen may have been entered already and as such a workhorse wire was taken and ultimately with a Choice PT wire, the true lumen was entered as this was confirmed by angiography as well as free movement of the wire. However, unsuccessful attempt was made towards wiring the PDA, the wire was only able to enter the PLV. As such the procedure was then converted to a star and a 2.5 mm cutting balloon was used to dilate the mid-distal segments to the crux. Final attempt was made to wire the PDA but this was unsuccessful including with use of a dual-lumen sasuke microcatheter, was felt that procedure was completed at this juncture and patient will need to be allowed to have artery heal over 1 to 2 months with return to Cath Lab thereafter for relook angiography and possible reattempt at  PCI. During procedure, hypotension and patricia were treated with vasopressors, atropine and ivfs and pt responded to that. CONCLUSIONS:   Successful PTA of acute marginal  Successful PTA of RCA  (using STAR technique)   Plan to allow for 1 to 2 months of healing followed by relook angiography and possible PCI. Admit to hospital for monitoring, BP/HR  Holding bp meds    Cath: 2/8/23  CORONARY ARTERIES     LM Less than 10% [id number] stenosis            LAD Moderately calcified proximal-mid 50 to 60% stenosis, distal 60 to 75% stenosis. Vessel wraps around the apex and distal disease is diffuse. LCX Moderately calcified, proximal-mid 60 to 75% stenosis. OM 1 is a large vessel with proximal-mid 30 to 40% stenosis.   There is a branch of this vessel which is small in caliber which has a 99 to

## 2023-09-14 NOTE — PATIENT INSTRUCTIONS
Will have our  contact you to arrange next procedure with Dr. Matt Jarquin  No change in current heart medicines  Hold the Kristen the day before and the day of the procedure  Will have you scheduled for a follow up appointment after the procedure         Latest Reference Range & Units 02/20/20 10:45 03/01/21 11:33 01/26/22 10:51 02/08/23 07:05 03/13/23 07:00 05/16/23 07:00   Cholesterol, Total 0 - 199 mg/dL 130   91 93 90   Cholesterol, Fasting 0 - 199 mg/dL   97      HDL Cholesterol 40 - 60 mg/dL 41  37 (L) 39 (L) 40 38 (L)   LDL Calculated <100 mg/dL 67  44 33 35 33   Triglycerides 0 - 150 mg/dL 112 74  97 89 97

## 2023-10-05 ENCOUNTER — TELEPHONE (OUTPATIENT)
Dept: CARDIOLOGY CLINIC | Age: 68
End: 2023-10-05

## 2023-10-05 NOTE — TELEPHONE ENCOUNTER
Patient is at acceptable (intermediate) cv risk for planned procedure/surgery. It would be okay to hold Brilinta as requested, would recommend resuming it within 2 days after the procedure.   Thank you for

## 2023-10-05 NOTE — TELEPHONE ENCOUNTER
Cardiac clearance request from [address] and sports medicine     Date of procedure n/a  Procedure Right percutaneous elbow tenotomy with renex    Anesthesia n/a   Requesting to hold brilinta for 5 days     LOV 9/14/2023  NPDD  EKG 5/16/2023    [phone]     NPDD OOT

## 2023-11-05 NOTE — H&P
Brief Pre-Op Note/Sedation Assessment      Judge Gould  1955  [id number]  8:30 AM    Planned Procedure: Cardiac Catheterization Procedure  Post Procedure Plan: Return to same level of care  Consent: I have discussed with the patient and/or the patient representative the indication, alternatives, and the possible risks and/or complications of the planned procedure and the anesthesia methods. The patient and/or patient representative appear to understand and agree to proceed. DISCUSSION OF CARDIAC CATHETERIZATION PROCEDURES: The procedures, indications, risks and alternatives have been discussed with the patient and, as appropriate, with the patient's guardian . Risks discussed included, but are not limited to, bleeding, development of blood clots/emboli, damage to blood vessels, renal failure, malignant cardiac arrhythmias, stroke, heart attack, emergent coronary bypass surgery, death, dye allergy. The patient (and guardian as appropriate) expressed understanding of the aforementioned and wished to proceed. Chief Complaint:   Anginal Equivalent  Dyspnea      Indications for Cath Procedure:  Presentation:  Worsening Angina  2. Anginal Classification within 2 weeks:  CCS III - Symptoms with everyday living activities, i.e., moderate limitation  3. Angina Symptoms Assessment:  Atypical Chest Pain  4. Heart Failure Class within last 2 weeks:  No symptoms  5. Cardiovascular Instability:  No    Prior Ischemic Workup/Eval:  Pre-Procedural Medications: Yes: Ca Channel Blockers and STATIN, bblockers  2. Stress Test Completed? Yes:  Stress or Imaging Studies Performed (within ANY time period):   Type:  Stress Nuclear  Results:  Positive:  Myocardial Perfusion Defects (Nuclear) Extent of Ischemia:  Intermediate    Does Patient need surgery?   Cath Valve Surgery:  No    Pre-Procedure Medical History:  Vital Signs:  Ht 5' 11\" (1.803 m)   Wt 192 lb 6.4 oz (87.3 kg)   BMI 26.83 kg/m²     BP 133/62  HR 72    Allergies:  No Known Allergies  Medications:    Current Facility-Administered Medications   Medication Dose Route Frequency Provider Last Rate Last Admin    sodium chloride flush 0.9 % injection 5-40 mL  5-40 mL IntraVENous 2 times per day Jenna Villalpando MD        sodium chloride flush 0.9 % injection 5-40 mL  5-40 mL IntraVENous PRN Jenna Villalpando MD        0.9 % sodium chloride infusion   IntraVENous Continuous Jenna Villalpando  mL/hr at 02/08/23 0707 1,000 mL at 02/08/23 0707    ondansetron (ZOFRAN) injection 4 mg  4 mg IntraVENous Q6H PRN Jenna Villalpando MD        LORazepam (ATIVAN) tablet 0.5 mg  0.5 mg Oral Once PRN Jenna Villalpando MD           Past Medical History:    Past Medical History:   Diagnosis Date    Hx of blood clots     Hyperlipidemia     Hypertension     PONV (postoperative nausea and vomiting)     Symptomatic cholelithiasis     Thrombosis        Surgical History:    Past Surgical History:   Procedure Laterality Date    ANKLE SURGERY      left    CHOLECYSTECTOMY, LAPAROSCOPIC N/A 8/13/2020    LAPAROSCOPIC CHOLECYSTECTOMY WITH INTRAOPERATIVE CHOLANGIOGRAM performed by Emigdio Vinson MD at [address]  03/08/2017    SHOULDER SURGERY Right 2012    TONSILLECTOMY      WRIST SURGERY      right             Pre-Sedation:  Pre-Sedation Documentation and Exam:  I have assessed the patient and reviewed the H&P on the chart. Prior History of Anesthesia Complications:   none    Modified Mallampati:  III (soft palate, base of uvula visible)    ASA Classification:  Class 3 - A patient with severe systemic disease that limits activity but is not incapacitating    Bela Scale:   Activity:  2 - Able to move 4 extremities voluntarily on command  Respiration:  2 - Able to breathe deeply and cough freely  Circulation:  2 - BP+/- 20mmHg of normal  Consciousness:  2 - Fully awake  Oxygen Saturation (color):  2 - Able to maintain oxygen saturation >92% on room air    Sedation/Anesthesia Plan:  Guard the patient's safety and welfare. Minimize physical discomfort and pain. Minimize negative psychological responses to treatment by providing sedation and analgesia and maximize the potential amnesia. Patient to meet pre-procedure discharge plan.     Medication Planned:  midazolam intravenously and fentanyl intravenously    Patient is an appropriate candidate for plan of sedation:   yes      Electronically signed by Tabby Bright MD on 2/8/2023 at 8:30 AM [facility]

## 2023-11-06 ENCOUNTER — TELEPHONE (OUTPATIENT)
Dept: CARDIOLOGY CLINIC | Age: 68
End: 2023-11-06

## 2023-11-06 NOTE — TELEPHONE ENCOUNTER
It would be ok to schedule, I would recommend to schedule for 2024, would schedule for RCA  PCI with anesthesia and [employer], will need to make sure we have stingray balloon, will need to make sure we have associated wires (hornet, astato, miracle bros 6, 9, and 12 and confienza pro) for that and also 6, 7, 8 fr trapliner.  Will need 4 hrs. Thank you.

## 2023-11-06 NOTE — TELEPHONE ENCOUNTER
SRJ, I spoke with pt regarding proceeding with PCI. He will be released from right elbow surgery on 12/4. He had an appt with [facility] today and is doing well. Ok to schedule PCI? If so, please advise how you would like me to schedule - reps/anesthesia/hours. Thank you.

## 2023-12-12 NOTE — TELEPHONE ENCOUNTER
Procedure:    Doctor:  Dr. Cantu  Date:  1/22/24  Time:  8am  Arrival:  6:30am  Reps:  Timi  Anesthesia:  Yes      Spoke with patient. Please have patient arrive to the main entrance of [facility] ([address] [place] [id number]) and check in with the registration desk.  They will be directed to the Cath Lab.  Please call patient regarding medication instructions. Remind patient to be NPO after midnight (8 hours prior). Do not apply lotions/creams on skin the day of procedure.

## 2023-12-19 NOTE — TELEPHONE ENCOUNTER
Called and spoke with patient about procedure and medications. Patient to hold farxiga morning of procedure. No vitamins or minerals morning of procedure. Patient VU

## 2024-01-22 ENCOUNTER — HOSPITAL ENCOUNTER (INPATIENT)
Dept: CARDIAC CATH/INVASIVE PROCEDURES | Age: 69
LOS: 1 days | Discharge: HOME | DRG: 251 | End: 2024-01-23
Attending: INTERNAL MEDICINE | Admitting: INTERNAL MEDICINE
Payer: MEDICARE

## 2024-01-22 ENCOUNTER — ANESTHESIA EVENT (OUTPATIENT)
Dept: CARDIAC CATH/INVASIVE PROCEDURES | Age: 69
DRG: 251 | End: 2024-01-22
Payer: MEDICARE

## 2024-01-22 ENCOUNTER — ANESTHESIA (OUTPATIENT)
Dept: CARDIAC CATH/INVASIVE PROCEDURES | Age: 69
DRG: 251 | End: 2024-01-22
Payer: MEDICARE

## 2024-01-22 PROBLEM — I25.10 CAD IN NATIVE ARTERY: Status: ACTIVE | Noted: 2024-01-22

## 2024-01-22 LAB
ANION GAP SERPL CALCULATED.3IONS-SCNC: 13 MMOL/L (ref 3–16)
BUN SERPL-MCNC: 20 MG/DL (ref 7–20)
CALCIUM SERPL-MCNC: 9.2 MG/DL (ref 8.3–10.6)
CHLORIDE SERPL-SCNC: 104 MMOL/L (ref 99–110)
CHOLEST SERPL-MCNC: 91 MG/DL (ref 0–199)
CO2 SERPL-SCNC: 24 MMOL/L (ref 21–32)
CREAT SERPL-MCNC: 1 MG/DL (ref 0.8–1.3)
DEPRECATED RDW RBC AUTO: 13.7 % (ref 12.4–15.4)
GFR SERPLBLD CREATININE-BSD FMLA CKD-EPI: >60 ML/MIN/{1.73_M2}
GLUCOSE SERPL-MCNC: 142 MG/DL (ref 70–99)
HCT VFR BLD AUTO: 48.3 % (ref 40.5–52.5)
HDLC SERPL-MCNC: 42 MG/DL (ref 40–60)
HGB BLD-MCNC: 16 G/DL (ref 13.5–17.5)
LDLC SERPL CALC-MCNC: 32 MG/DL
MCH RBC QN AUTO: 29 PG (ref 26–34)
MCHC RBC AUTO-ENTMCNC: 33.2 G/DL (ref 31–36)
MCV RBC AUTO: 87.3 FL (ref 80–100)
PLATELET # BLD AUTO: 152 K/UL (ref 135–450)
PMV BLD AUTO: 7.7 FL (ref 5–10.5)
POTASSIUM SERPL-SCNC: 3.8 MMOL/L (ref 3.5–5.1)
RBC # BLD AUTO: 5.53 M/UL (ref 4.2–5.9)
SODIUM SERPL-SCNC: 141 MMOL/L (ref 136–145)
TRIGL SERPL-MCNC: 84 MG/DL (ref 0–150)
VLDLC SERPL CALC-MCNC: 17 MG/DL
WBC # BLD AUTO: 5 K/UL (ref 4–11)

## 2024-01-22 PROCEDURE — 6370000000 HC RX 637 (ALT 250 FOR IP)

## 2024-01-22 PROCEDURE — 85027 COMPLETE CBC AUTOMATED: CPT

## 2024-01-22 PROCEDURE — 85347 COAGULATION TIME ACTIVATED: CPT

## 2024-01-22 PROCEDURE — 2709999900 HC NON-CHARGEABLE SUPPLY: Performed by: INTERNAL MEDICINE

## 2024-01-22 PROCEDURE — 6360000002 HC RX W HCPCS: Performed by: NURSE ANESTHETIST, CERTIFIED REGISTERED

## 2024-01-22 PROCEDURE — 1200000000 HC SEMI PRIVATE

## 2024-01-22 PROCEDURE — 3700000000 HC ANESTHESIA ATTENDED CARE: Performed by: ANESTHESIOLOGY

## 2024-01-22 PROCEDURE — 92978 ENDOLUMINL IVUS OCT C 1ST: CPT | Performed by: INTERNAL MEDICINE

## 2024-01-22 PROCEDURE — 93005 ELECTROCARDIOGRAM TRACING: CPT | Performed by: INTERNAL MEDICINE

## 2024-01-22 PROCEDURE — 2580000003 HC RX 258

## 2024-01-22 PROCEDURE — 2580000003 HC RX 258: Performed by: INTERNAL MEDICINE

## 2024-01-22 PROCEDURE — 7100000000 HC PACU RECOVERY - FIRST 15 MIN: Performed by: ANESTHESIOLOGY

## 2024-01-22 PROCEDURE — C1753 CATH, INTRAVAS ULTRASOUND: HCPCS | Performed by: INTERNAL MEDICINE

## 2024-01-22 PROCEDURE — 2580000003 HC RX 258: Performed by: NURSE ANESTHETIST, CERTIFIED REGISTERED

## 2024-01-22 PROCEDURE — 2720000010 HC SURG SUPPLY STERILE: Performed by: INTERNAL MEDICINE

## 2024-01-22 PROCEDURE — C1894 INTRO/SHEATH, NON-LASER: HCPCS | Performed by: INTERNAL MEDICINE

## 2024-01-22 PROCEDURE — 6360000004 HC RX CONTRAST MEDICATION

## 2024-01-22 PROCEDURE — 92943 PRQ TRLUML REVSC CH OCC ANT: CPT

## 2024-01-22 PROCEDURE — 2500000003 HC RX 250 WO HCPCS

## 2024-01-22 PROCEDURE — 80048 BASIC METABOLIC PNL TOTAL CA: CPT

## 2024-01-22 PROCEDURE — 3700000001 HC ADD 15 MINUTES (ANESTHESIA): Performed by: ANESTHESIOLOGY

## 2024-01-22 PROCEDURE — 92943 PRQ TRLUML REVSC CH OCC ANT: CPT | Performed by: INTERNAL MEDICINE

## 2024-01-22 PROCEDURE — 6360000002 HC RX W HCPCS

## 2024-01-22 PROCEDURE — 6370000000 HC RX 637 (ALT 250 FOR IP): Performed by: NURSE PRACTITIONER

## 2024-01-22 PROCEDURE — C1887 CATHETER, GUIDING: HCPCS | Performed by: INTERNAL MEDICINE

## 2024-01-22 PROCEDURE — C1769 GUIDE WIRE: HCPCS | Performed by: INTERNAL MEDICINE

## 2024-01-22 PROCEDURE — B2111ZZ FLUOROSCOPY OF MULTIPLE CORONARY ARTERIES USING LOW OSMOLAR CONTRAST: ICD-10-PCS | Performed by: INTERNAL MEDICINE

## 2024-01-22 PROCEDURE — C1760 CLOSURE DEV, VASC: HCPCS | Performed by: INTERNAL MEDICINE

## 2024-01-22 PROCEDURE — 7100000001 HC PACU RECOVERY - ADDTL 15 MIN: Performed by: ANESTHESIOLOGY

## 2024-01-22 PROCEDURE — C1725 CATH, TRANSLUMIN NON-LASER: HCPCS | Performed by: INTERNAL MEDICINE

## 2024-01-22 PROCEDURE — 02703ZZ DILATION OF CORONARY ARTERY, ONE ARTERY, PERCUTANEOUS APPROACH: ICD-10-PCS | Performed by: INTERNAL MEDICINE

## 2024-01-22 PROCEDURE — 4A023N7 MEASUREMENT OF CARDIAC SAMPLING AND PRESSURE, LEFT HEART, PERCUTANEOUS APPROACH: ICD-10-PCS | Performed by: INTERNAL MEDICINE

## 2024-01-22 PROCEDURE — 2500000003 HC RX 250 WO HCPCS: Performed by: NURSE ANESTHETIST, CERTIFIED REGISTERED

## 2024-01-22 PROCEDURE — 80061 LIPID PANEL: CPT

## 2024-01-22 PROCEDURE — 92978 ENDOLUMINL IVUS OCT C 1ST: CPT

## 2024-01-22 PROCEDURE — B240ZZ3 ULTRASONOGRAPHY OF SINGLE CORONARY ARTERY, INTRAVASCULAR: ICD-10-PCS | Performed by: INTERNAL MEDICINE

## 2024-01-22 RX ORDER — CARVEDILOL 3.12 MG/1
3.12 TABLET ORAL 2 TIMES DAILY WITH MEALS
Status: DISCONTINUED | OUTPATIENT
Start: 2024-01-23 | End: 2024-01-23 | Stop reason: HOSPADM

## 2024-01-22 RX ORDER — SODIUM CHLORIDE 0.9 % (FLUSH) 0.9 %
5-40 SYRINGE (ML) INJECTION PRN
OUTPATIENT
Start: 2024-01-23

## 2024-01-22 RX ORDER — ASPIRIN 81 MG/1
81 TABLET, CHEWABLE ORAL DAILY
Status: DISCONTINUED | OUTPATIENT
Start: 2024-01-23 | End: 2024-01-23 | Stop reason: HOSPADM

## 2024-01-22 RX ORDER — IPRATROPIUM BROMIDE AND ALBUTEROL SULFATE 2.5; .5 MG/3ML; MG/3ML
1 SOLUTION RESPIRATORY (INHALATION)
OUTPATIENT
Start: 2024-01-23 | End: 2024-01-24

## 2024-01-22 RX ORDER — EPHEDRINE SULFATE 50 MG/ML
INJECTION INTRAVENOUS PRN
Status: DISCONTINUED | OUTPATIENT
Start: 2024-01-22 | End: 2024-01-22 | Stop reason: SDUPTHER

## 2024-01-22 RX ORDER — HEPARIN SODIUM 1000 [USP'U]/ML
INJECTION, SOLUTION INTRAVENOUS; SUBCUTANEOUS
Status: COMPLETED | OUTPATIENT
Start: 2024-01-22 | End: 2024-01-22

## 2024-01-22 RX ORDER — SODIUM CHLORIDE 0.9 % (FLUSH) 0.9 %
5-40 SYRINGE (ML) INJECTION PRN
Status: DISCONTINUED | OUTPATIENT
Start: 2024-01-22 | End: 2024-01-23 | Stop reason: HOSPADM

## 2024-01-22 RX ORDER — SODIUM CHLORIDE 0.9 % (FLUSH) 0.9 %
5-40 SYRINGE (ML) INJECTION EVERY 12 HOURS SCHEDULED
Status: DISCONTINUED | OUTPATIENT
Start: 2024-01-22 | End: 2024-01-23 | Stop reason: HOSPADM

## 2024-01-22 RX ORDER — SODIUM CHLORIDE 0.9 % (FLUSH) 0.9 %
5-40 SYRINGE (ML) INJECTION EVERY 12 HOURS SCHEDULED
OUTPATIENT
Start: 2024-01-23

## 2024-01-22 RX ORDER — FENTANYL CITRATE 50 UG/ML
INJECTION, SOLUTION INTRAMUSCULAR; INTRAVENOUS PRN
Status: DISCONTINUED | OUTPATIENT
Start: 2024-01-22 | End: 2024-01-22 | Stop reason: SDUPTHER

## 2024-01-22 RX ORDER — SODIUM CHLORIDE 9 MG/ML
INJECTION, SOLUTION INTRAVENOUS PRN
OUTPATIENT
Start: 2024-01-23

## 2024-01-22 RX ORDER — PROPOFOL 10 MG/ML
INJECTION, EMULSION INTRAVENOUS PRN
Status: DISCONTINUED | OUTPATIENT
Start: 2024-01-22 | End: 2024-01-22 | Stop reason: SDUPTHER

## 2024-01-22 RX ORDER — ONDANSETRON 2 MG/ML
INJECTION INTRAMUSCULAR; INTRAVENOUS PRN
Status: DISCONTINUED | OUTPATIENT
Start: 2024-01-22 | End: 2024-01-22 | Stop reason: SDUPTHER

## 2024-01-22 RX ORDER — ACETAMINOPHEN 325 MG/1
650 TABLET ORAL EVERY 4 HOURS PRN
Status: DISCONTINUED | OUTPATIENT
Start: 2024-01-22 | End: 2024-01-23 | Stop reason: HOSPADM

## 2024-01-22 RX ORDER — AMLODIPINE BESYLATE 5 MG/1
5 TABLET ORAL DAILY
Status: DISCONTINUED | OUTPATIENT
Start: 2024-01-23 | End: 2024-01-23 | Stop reason: HOSPADM

## 2024-01-22 RX ORDER — SODIUM CHLORIDE 9 MG/ML
INJECTION, SOLUTION INTRAVENOUS CONTINUOUS
Status: DISCONTINUED | OUTPATIENT
Start: 2024-01-22 | End: 2024-01-23 | Stop reason: HOSPADM

## 2024-01-22 RX ORDER — SODIUM CHLORIDE 9 MG/ML
INJECTION, SOLUTION INTRAVENOUS PRN
Status: DISCONTINUED | OUTPATIENT
Start: 2024-01-22 | End: 2024-01-23 | Stop reason: HOSPADM

## 2024-01-22 RX ORDER — HYDROMORPHONE HYDROCHLORIDE 1 MG/ML
0.25 INJECTION, SOLUTION INTRAMUSCULAR; INTRAVENOUS; SUBCUTANEOUS EVERY 5 MIN PRN
OUTPATIENT
Start: 2024-01-23

## 2024-01-22 RX ORDER — LORAZEPAM 0.5 MG/1
0.5 TABLET ORAL
Status: ACTIVE | OUTPATIENT
Start: 2024-01-22 | End: 2024-01-23

## 2024-01-22 RX ORDER — ATROPINE SULFATE 0.4 MG/ML
INJECTION, SOLUTION INTRAMUSCULAR; INTRAVENOUS; SUBCUTANEOUS PRN
Status: DISCONTINUED | OUTPATIENT
Start: 2024-01-22 | End: 2024-01-22 | Stop reason: SDUPTHER

## 2024-01-22 RX ORDER — ONDANSETRON 2 MG/ML
4 INJECTION INTRAMUSCULAR; INTRAVENOUS
OUTPATIENT
Start: 2024-01-23 | End: 2024-01-24

## 2024-01-22 RX ORDER — HYDROMORPHONE HYDROCHLORIDE 1 MG/ML
0.5 INJECTION, SOLUTION INTRAMUSCULAR; INTRAVENOUS; SUBCUTANEOUS EVERY 5 MIN PRN
OUTPATIENT
Start: 2024-01-23

## 2024-01-22 RX ORDER — ASPIRIN 325 MG
325 TABLET ORAL ONCE
Status: COMPLETED | OUTPATIENT
Start: 2024-01-22 | End: 2024-01-22

## 2024-01-22 RX ORDER — ROCURONIUM BROMIDE 10 MG/ML
INJECTION, SOLUTION INTRAVENOUS PRN
Status: DISCONTINUED | OUTPATIENT
Start: 2024-01-22 | End: 2024-01-22 | Stop reason: SDUPTHER

## 2024-01-22 RX ORDER — PROCHLORPERAZINE EDISYLATE 5 MG/ML
5 INJECTION INTRAMUSCULAR; INTRAVENOUS
OUTPATIENT
Start: 2024-01-23 | End: 2024-01-24

## 2024-01-22 RX ORDER — MEPERIDINE HYDROCHLORIDE 50 MG/ML
12.5 INJECTION INTRAMUSCULAR; INTRAVENOUS; SUBCUTANEOUS EVERY 5 MIN PRN
OUTPATIENT
Start: 2024-01-23

## 2024-01-22 RX ORDER — LIDOCAINE HYDROCHLORIDE 20 MG/ML
INJECTION, SOLUTION EPIDURAL; INFILTRATION; INTRACAUDAL; PERINEURAL PRN
Status: DISCONTINUED | OUTPATIENT
Start: 2024-01-22 | End: 2024-01-22 | Stop reason: SDUPTHER

## 2024-01-22 RX ORDER — ONDANSETRON 2 MG/ML
4 INJECTION INTRAMUSCULAR; INTRAVENOUS EVERY 6 HOURS PRN
Status: DISCONTINUED | OUTPATIENT
Start: 2024-01-22 | End: 2024-01-23 | Stop reason: HOSPADM

## 2024-01-22 RX ORDER — DEXAMETHASONE SODIUM PHOSPHATE 4 MG/ML
INJECTION, SOLUTION INTRA-ARTICULAR; INTRALESIONAL; INTRAMUSCULAR; INTRAVENOUS; SOFT TISSUE PRN
Status: DISCONTINUED | OUTPATIENT
Start: 2024-01-22 | End: 2024-01-22 | Stop reason: SDUPTHER

## 2024-01-22 RX ORDER — OXYCODONE HYDROCHLORIDE 5 MG/1
10 TABLET ORAL PRN
OUTPATIENT
Start: 2024-01-23

## 2024-01-22 RX ORDER — ATORVASTATIN CALCIUM 40 MG/1
40 TABLET, FILM COATED ORAL NIGHTLY
Status: DISCONTINUED | OUTPATIENT
Start: 2024-01-22 | End: 2024-01-23 | Stop reason: HOSPADM

## 2024-01-22 RX ORDER — OXYCODONE HYDROCHLORIDE 5 MG/1
5 TABLET ORAL
OUTPATIENT
Start: 2024-01-23

## 2024-01-22 RX ADMIN — EPHEDRINE SULFATE 7.5 MG: 50 INJECTION INTRAVENOUS at 12:59

## 2024-01-22 RX ADMIN — PHENYLEPHRINE HYDROCHLORIDE 200 MCG: 10 INJECTION INTRAVENOUS at 09:07

## 2024-01-22 RX ADMIN — ROCURONIUM BROMIDE 10 MG: 50 INJECTION, SOLUTION INTRAVENOUS at 10:14

## 2024-01-22 RX ADMIN — SODIUM CHLORIDE: 9 INJECTION, SOLUTION INTRAVENOUS at 13:11

## 2024-01-22 RX ADMIN — ATORVASTATIN CALCIUM 40 MG: 40 TABLET, FILM COATED ORAL at 20:57

## 2024-01-22 RX ADMIN — SODIUM CHLORIDE: 9 INJECTION, SOLUTION INTRAVENOUS at 08:20

## 2024-01-22 RX ADMIN — EPHEDRINE SULFATE 7.5 MG: 50 INJECTION INTRAVENOUS at 09:53

## 2024-01-22 RX ADMIN — HEPARIN SODIUM 2000 UNITS: 1000 INJECTION, SOLUTION INTRAVENOUS; SUBCUTANEOUS at 11:43

## 2024-01-22 RX ADMIN — ONDANSETRON 4 MG: 2 INJECTION INTRAMUSCULAR; INTRAVENOUS at 13:07

## 2024-01-22 RX ADMIN — FENTANYL CITRATE 50 MCG: 50 INJECTION, SOLUTION INTRAMUSCULAR; INTRAVENOUS at 10:01

## 2024-01-22 RX ADMIN — ATROPINE SULFATE 0.4 MG: 0.4 INJECTION, SOLUTION INTRAMUSCULAR; INTRAVENOUS; SUBCUTANEOUS at 13:00

## 2024-01-22 RX ADMIN — HEPARIN SODIUM 1000 UNITS: 1000 INJECTION, SOLUTION INTRAVENOUS; SUBCUTANEOUS at 10:37

## 2024-01-22 RX ADMIN — EPHEDRINE SULFATE 5 MG: 50 INJECTION INTRAVENOUS at 12:15

## 2024-01-22 RX ADMIN — ROCURONIUM BROMIDE 50 MG: 50 INJECTION, SOLUTION INTRAVENOUS at 08:31

## 2024-01-22 RX ADMIN — DEXAMETHASONE SODIUM PHOSPHATE 4 MG: 4 INJECTION, SOLUTION INTRAMUSCULAR; INTRAVENOUS at 08:43

## 2024-01-22 RX ADMIN — HEPARIN SODIUM 5000 UNITS: 1000 INJECTION, SOLUTION INTRAVENOUS; SUBCUTANEOUS at 09:13

## 2024-01-22 RX ADMIN — Medication 10 ML: at 07:37

## 2024-01-22 RX ADMIN — FENTANYL CITRATE 50 MCG: 50 INJECTION, SOLUTION INTRAMUSCULAR; INTRAVENOUS at 13:05

## 2024-01-22 RX ADMIN — ROCURONIUM BROMIDE 10 MG: 50 INJECTION, SOLUTION INTRAVENOUS at 10:29

## 2024-01-22 RX ADMIN — PROPOFOL 160 MG: 10 INJECTION, EMULSION INTRAVENOUS at 08:31

## 2024-01-22 RX ADMIN — HEPARIN SODIUM 1000 UNITS: 1000 INJECTION, SOLUTION INTRAVENOUS; SUBCUTANEOUS at 10:04

## 2024-01-22 RX ADMIN — TICAGRELOR 90 MG: 90 TABLET ORAL at 20:57

## 2024-01-22 RX ADMIN — Medication 325 MG: at 07:56

## 2024-01-22 RX ADMIN — LIDOCAINE HYDROCHLORIDE 80 MG: 20 INJECTION, SOLUTION EPIDURAL; INFILTRATION; INTRACAUDAL; PERINEURAL at 08:31

## 2024-01-22 RX ADMIN — SODIUM CHLORIDE: 9 INJECTION, SOLUTION INTRAVENOUS at 23:26

## 2024-01-22 RX ADMIN — EPHEDRINE SULFATE 5 MG: 50 INJECTION INTRAVENOUS at 12:50

## 2024-01-22 RX ADMIN — ROCURONIUM BROMIDE 20 MG: 50 INJECTION, SOLUTION INTRAVENOUS at 09:18

## 2024-01-22 RX ADMIN — SUGAMMADEX 200 MG: 100 INJECTION, SOLUTION INTRAVENOUS at 13:20

## 2024-01-22 RX ADMIN — ROCURONIUM BROMIDE 10 MG: 50 INJECTION, SOLUTION INTRAVENOUS at 11:34

## 2024-01-22 RX ADMIN — HEPARIN SODIUM 2000 UNITS: 1000 INJECTION, SOLUTION INTRAVENOUS; SUBCUTANEOUS at 09:18

## 2024-01-22 RX ADMIN — PROPOFOL 40 MG: 10 INJECTION, EMULSION INTRAVENOUS at 10:01

## 2024-01-22 RX ADMIN — PHENYLEPHRINE HYDROCHLORIDE 10 MCG/MIN: 10 INJECTION INTRAVENOUS at 08:59

## 2024-01-22 ASSESSMENT — PAIN SCALES - GENERAL: PAINLEVEL_OUTOF10: 0

## 2024-01-22 NOTE — ANESTHESIA PRE PROCEDURE
Department of Anesthesiology  Preprocedure Note       Name:  Vinny Castillo   Age:  68 y.o.  :  1955                                          MRN:  [id number]         Date:  2024      Surgeon: * Surgery not found *    Procedure:     Medications prior to admission:   Prior to Admission medications    Medication Sig Start Date End Date Taking? Authorizing Provider   amLODIPine (NORVASC) 5 MG tablet Take 1 tablet by mouth daily 23   Barbara Reardon APRN - CNP   ticagrelor (BRILINTA) 90 MG TABS tablet Take 1 tablet by mouth 2 times daily 23   Barbara Reardon APRN - CNP   carvedilol (COREG) 3.125 MG tablet Take 1 tablet by mouth 2 times daily (with meals) 23   Barbara Reardon APRN - CNP   aspirin EC 81 MG EC tablet Take 1 tablet by mouth daily 3/14/23   Jose Cantu MD   dapagliflozin (FARXIGA) 5 MG tablet Take 1 tablet by mouth every morning Starting in 20   Marbella Avery MD   atorvastatin (LIPITOR) 40 MG tablet Take 1 tablet by mouth nightly 10/17/22   Marbella Avery MD       Current medications:    Current Facility-Administered Medications   Medication Dose Route Frequency Provider Last Rate Last Admin   • sodium chloride flush 0.9 % injection 5-40 mL  5-40 mL IntraVENous 2 times per day Jose Cantu MD       • sodium chloride flush 0.9 % injection 5-40 mL  5-40 mL IntraVENous PRN Jose Cantu MD       • 0.9 % sodium chloride infusion   IntraVENous PRN Jose Cantu MD       • aspirin tablet 325 mg  325 mg Oral Once Jose Cantu MD       • ondansetron (ZOFRAN) injection 4 mg  4 mg IntraVENous Q6H PRN Jose Cantu MD       • LORazepam (ATIVAN) tablet 0.5 mg  0.5 mg Oral Once PRN Jose Cantu MD           Allergies:    Allergies   Allergen Reactions   • Metformin Diarrhea       Problem List:    Patient Active Problem List   Diagnosis Code   • Mesenteric vein thrombosis (HCC) K55.069   • Essential hypertension I10   • Mixed hyperlipidemia

## 2024-01-22 NOTE — PROGRESS NOTES
Report given to B3 RN Jeaneth, all questions answered.  [facility] notified of upcoming transfer to [facility]. TR band off. Patient off bedrest @ 1730. Groin site unremarkable, bilat pedal pulses +2. Awaiting transport. VSS on room air.

## 2024-01-22 NOTE — ANESTHESIA POSTPROCEDURE EVALUATION
Department of Anesthesiology  Postprocedure Note    Patient: Vinny Castillo  MRN: [id number]  YOB: 1955  Date of evaluation: 1/22/2024    Procedure Summary       Date: 01/22/24 Room / Location: [facility] Cardiac Cath Lab    Anesthesia Start: 0822 Anesthesia Stop:     Procedure: CHRONIC TOTAL OCCLUSION Diagnosis:       Chronic total occlusion of coronary artery      CAD in native artery    Scheduled Providers: Jose Rivers MD Responsible Provider: Jose Rivers MD    Anesthesia Type: general ASA Status: 3            Anesthesia Type: No value filed.    Bela Phase I: Bela Score: 10    Bela Phase II:      Anesthesia Post Evaluation    Patient location during evaluation: PACU  Patient participation: complete - patient participated  Level of consciousness: awake and alert  Airway patency: patent  Nausea & Vomiting: no nausea and no vomiting  Cardiovascular status: hemodynamically stable  Respiratory status: acceptable  Hydration status: euvolemic  Pain management: adequate    No notable events documented.

## 2024-01-22 NOTE — H&P
Complications:   none    Modified Mallampati:  III (soft palate, base of uvula visible)    ASA Classification:  Class 3 - A patient with severe systemic disease that limits activity but is not incapacitating    Bela Scale:  Activity:  2 - Able to move 4 extremities voluntarily on command  Respiration:  2 - Able to breathe deeply and cough freely  Circulation:  2 - BP+/- 20mmHg of normal  Consciousness:  2 - Fully awake  Oxygen Saturation (color):  2 - Able to maintain oxygen saturation >92% on room air    Sedation/Anesthesia Plan:  Guard the patient's safety and welfare.  Minimize physical discomfort and pain.  Minimize negative psychological responses to treatment by providing sedation and analgesia and maximize the potential amnesia.  Patient to meet pre-procedure discharge plan.    Medication Planned:  As per anesthesiology service.      Patient is an appropriate candidate for plan of sedation:   yes      Electronically signed by Jose Cantu MD on 1/22/2024 at 7:58 AM

## 2024-01-22 NOTE — DISCHARGE INSTRUCTIONS
Cath Labs at  [facility]   Discharge Instructions        1/22/2024  Vinny Castillo   Date of Birth 1955       Activity:  No driving for 24 hours.  In 24 hours you may remove dressing and shower, wash site gently with soap and water and leave open to air  Avoid submerging your arm in sitting water for 5 days.  Do not use your right                  Cath Lab at  [facility]    Discharge Instructions        1/22/2024  Vinny Castillo   Date of Birth 1955     Activity:  No driving for 24 hours.  No Tub baths, swimming or hot tubs for 5 days.  In 24 hours you may remove dressing and shower.  Wash site with soap and water and leave open to air  No lotions, powders or ointments near site for 5 days.   No lifting over 5 pounds for 5 days.  Return to work/school in 5 days unless instructed otherwise by your doctor.    Diet:  Resume previous diet unless instructed otherwise by your doctor, if so general guidelines for a cardiac diet will be provided to you.   Drink extra non-alcoholic/decaffienated fluids for first 24 hours after your procedure.    Groin Management:  If you have stairs to walk up, pretend you have a cast on the affected leg walk up them without bending affected leg.  When you go home go to the bed or sofa, do not get up except to go to the bathroom.  Avoid strenuous activity for 5 days. For example, lifting heavy objects greater than 10 lbs, bicycling, jogging, climbing stairs, or walking long distances.  If bleeding occurs from the site or a hematoma (lump), begins to increase in size, apply pressure directly over the site and call 911 to return to the hospital.     Special Instructions:  Report any coolness or numbness in the leg where the cath was done to the MD or call 911  Report any chills, fever, itching, red bumps or rash.  Report any of the following to the MD: drainage from the cath site, redness and/or swelling at the site, increased tenderness at the site.  If you are currently

## 2024-01-22 NOTE — PROGRESS NOTES
Back to pre post bay 4. Vitals stable on room air. Awake/alert. Groin site with vascade and radial site with TR band. Both sites unremarkable. Fluids infusing @ 100ml/hr and urine in herrera clear/yellow. Will cont to monitor.

## 2024-01-22 NOTE — PROCEDURES
CARDIAC CATHETERIZATION REPORT     Procedure Date:  2024  Patient Name: Vinny Castillo  MRN: [id number] : 1955      INDICATION     RCA     PROCEDURES PERFORMED     Left heart catheterization  Coronary angiogam  Coronary cath    Temporary TV pacer insertion and removal  IVUS of RCA  PTA of RCA         PROCEDURE DESCRIPTION   Risks/benefits/alternatives/outcomes were discussed with patient and/or family and informed consent was obtained.  Using the Barbeau scale, the patient's right radial artery was found to be a level B.  Patient was prepped draped in the usual sterile fashion.  Local anaesthetic was applied over puncture sites.  Using fluoroscopic and ultrasound guidance with a micropuncture kit both a 8 Russian sheath was inserted into the right common femoral vein as well as artery and temporary transvenous pacer was inserted through the venous sheath and placed in backup mode during the procedure and this was able to be removed at the end of the procedure and the venous site was closed with a Vascade MVP device.  The arterial sheath was ultimately upsized to an 8 Russian 65 cm Terumo destination sheath and that was utilized for PCI.  See below for PCI details.  The groin arterial site was closed with a Perclose device at the end of the procedure.  Using a back wall technique, a 6/7 Welsh Terumo sheath was inserted into right radial artery.  Verapamil, nitroglycerin were administered through the sheath.  Heparin was administered.  Radial site was used for dual-lumen injections diagnostic angiograms were previously obtained, see prior cath reports, would add that additional dual-lumen injections were obtained through the guide catheters including 7 Russian EBU 4.0 90 cm guide in the left main and 8 Russian AL 0.75 guide in the RCA.  At the conclusion of the procedure, a TR band was placed over the radial puncture site and hemostasis was obtained.  There were no immediate complications.

## 2024-01-22 NOTE — PROGRESS NOTES
D: Patient here from Cath Lab via stretcher, taken to bay 9 in PACU, all current drips, treatments, skin issues, and plan of care were reviewed by both RN's, patient transferred in stable condition, patient is awake, alert, and oriented x 4, denies pain at present, call light is in reach. A: Assessment completed and documented, discussed plan of care with patient who agreed.

## 2024-01-23 VITALS
HEART RATE: 63 BPM | WEIGHT: 192 LBS | DIASTOLIC BLOOD PRESSURE: 79 MMHG | TEMPERATURE: 98.5 F | SYSTOLIC BLOOD PRESSURE: 132 MMHG | RESPIRATION RATE: 16 BRPM | OXYGEN SATURATION: 98 % | HEIGHT: 71 IN | BODY MASS INDEX: 26.88 KG/M2

## 2024-01-23 PROBLEM — I25.82 CHRONIC TOTAL OCCLUSION OF CORONARY ARTERY: Status: ACTIVE | Noted: 2024-01-23

## 2024-01-23 LAB
ANION GAP SERPL CALCULATED.3IONS-SCNC: 12 MMOL/L (ref 3–16)
BUN SERPL-MCNC: 19 MG/DL (ref 7–20)
CALCIUM SERPL-MCNC: 8.1 MG/DL (ref 8.3–10.6)
CHLORIDE SERPL-SCNC: 108 MMOL/L (ref 99–110)
CO2 SERPL-SCNC: 22 MMOL/L (ref 21–32)
CREAT SERPL-MCNC: 1 MG/DL (ref 0.8–1.3)
DEPRECATED RDW RBC AUTO: 14 % (ref 12.4–15.4)
EKG ATRIAL RATE: 55 BPM
EKG ATRIAL RATE: 65 BPM
EKG DIAGNOSIS: NORMAL
EKG DIAGNOSIS: NORMAL
EKG P AXIS: 61 DEGREES
EKG P AXIS: 64 DEGREES
EKG P-R INTERVAL: 140 MS
EKG P-R INTERVAL: 148 MS
EKG Q-T INTERVAL: 422 MS
EKG Q-T INTERVAL: 438 MS
EKG QRS DURATION: 84 MS
EKG QRS DURATION: 84 MS
EKG QTC CALCULATION (BAZETT): 419 MS
EKG QTC CALCULATION (BAZETT): 438 MS
EKG R AXIS: 47 DEGREES
EKG R AXIS: 58 DEGREES
EKG T AXIS: 64 DEGREES
EKG T AXIS: 73 DEGREES
EKG VENTRICULAR RATE: 55 BPM
EKG VENTRICULAR RATE: 65 BPM
GFR SERPLBLD CREATININE-BSD FMLA CKD-EPI: >60 ML/MIN/{1.73_M2}
GLUCOSE SERPL-MCNC: 127 MG/DL (ref 70–99)
HCT VFR BLD AUTO: 40.3 % (ref 40.5–52.5)
HGB BLD-MCNC: 13.6 G/DL (ref 13.5–17.5)
MCH RBC QN AUTO: 29.5 PG (ref 26–34)
MCHC RBC AUTO-ENTMCNC: 33.8 G/DL (ref 31–36)
MCV RBC AUTO: 87.5 FL (ref 80–100)
PLATELET # BLD AUTO: 141 K/UL (ref 135–450)
PMV BLD AUTO: 8.3 FL (ref 5–10.5)
POTASSIUM SERPL-SCNC: 4.2 MMOL/L (ref 3.5–5.1)
RBC # BLD AUTO: 4.61 M/UL (ref 4.2–5.9)
SODIUM SERPL-SCNC: 142 MMOL/L (ref 136–145)
WBC # BLD AUTO: 13 K/UL (ref 4–11)

## 2024-01-23 PROCEDURE — 93306 TTE W/DOPPLER COMPLETE: CPT

## 2024-01-23 PROCEDURE — 6370000000 HC RX 637 (ALT 250 FOR IP): Performed by: NURSE PRACTITIONER

## 2024-01-23 PROCEDURE — 85027 COMPLETE CBC AUTOMATED: CPT

## 2024-01-23 PROCEDURE — 80048 BASIC METABOLIC PNL TOTAL CA: CPT

## 2024-01-23 PROCEDURE — 36415 COLL VENOUS BLD VENIPUNCTURE: CPT

## 2024-01-23 PROCEDURE — 2580000003 HC RX 258: Performed by: INTERNAL MEDICINE

## 2024-01-23 PROCEDURE — 99239 HOSP IP/OBS DSCHRG MGMT >30: CPT | Performed by: NURSE PRACTITIONER

## 2024-01-23 PROCEDURE — 93010 ELECTROCARDIOGRAM REPORT: CPT | Performed by: INTERNAL MEDICINE

## 2024-01-23 RX ORDER — NITROGLYCERIN 0.4 MG/1
0.4 TABLET SUBLINGUAL EVERY 5 MIN PRN
Qty: 25 TABLET | Refills: 3 | Status: SHIPPED | OUTPATIENT
Start: 2024-01-23

## 2024-01-23 RX ADMIN — TICAGRELOR 90 MG: 90 TABLET ORAL at 09:04

## 2024-01-23 RX ADMIN — Medication 10 ML: at 09:04

## 2024-01-23 RX ADMIN — CARVEDILOL 3.12 MG: 3.12 TABLET, FILM COATED ORAL at 09:04

## 2024-01-23 RX ADMIN — ASPIRIN 81 MG 81 MG: 81 TABLET ORAL at 09:04

## 2024-01-23 RX ADMIN — AMLODIPINE BESYLATE 5 MG: 5 TABLET ORAL at 09:04

## 2024-01-23 NOTE — PROGRESS NOTES
Patient discharged home. avs and scripts given. Patient educated using teach back method. Patient taken to main entrance via w/c and picked up by family.

## 2024-01-23 NOTE — CARE COORDINATION
Case Management Assessment  Initial Evaluation    Date/Time of Evaluation: 1/23/2024 9:59 AM  Assessment Completed by: Lizeth Jessica RN    If patient is discharged prior to next notation, then this note serves as note for discharge by case management.    Patient Name: Vinny Castillo                   YOB: 1955  Diagnosis: Chronic total occlusion of coronary artery [I25.82]  CAD in native artery [I25.10]                   Date / Time: 1/22/2024  6:29 AM    Patient Admission Status: Inpatient   Readmission Risk (Low < 19, Mod (19-27), High > 27): Readmission Risk Score: 6.8    Current PCP: Koko Singleton MD  PCP verified by CM?      Chart Reviewed: Yes      History Provided by:    Patient Orientation:      Patient Cognition:      Hospitalization in the last 30 days (Readmission):  No    If yes, Readmission Assessment in  Navigator will be completed.    Advance Directives:      Code Status: Full Code   Patient's Primary Decision Maker is: Legal Next of Kin    Primary Decision Maker: Jackie Castillo - Spouse - [phone]    Discharge Planning:    Patient lives with: Spouse/Significant Other Type of Home: House  Primary Care Giver:    Patient Support Systems include: Spouse/Significant Other, Family Members   Current Financial resources:    Current community resources:    Current services prior to admission: None            Current DME:              Type of Home Care services:  None    ADLS  Prior functional level:    Current functional level:      PT AM-PAC:   /24  OT AM-PAC:   /24    Family can provide assistance at DC:    Would you like Case Management to discuss the discharge plan with any other family members/significant others, and if so, who?    Plans to Return to Present Housing:    Other Identified Issues/Barriers to RETURNING to current housing:   Potential Assistance needed at discharge: N/A            Potential DME:    Patient expects to discharge to: House  Plan for transportation at

## 2024-01-23 NOTE — PLAN OF CARE
Problem: Chronic Conditions and Co-morbidities  Goal: Patient's chronic conditions and co-morbidity symptoms are monitored and maintained or improved  1/23/2024 1532 by Pratima Long RN  Outcome: Completed  1/23/2024 0453 by Keith Peacock RN  Outcome: Progressing     Problem: Discharge Planning  Goal: Discharge to home or other facility with appropriate resources  1/23/2024 1532 by Pratima Long RN  Outcome: Completed  1/23/2024 0453 by Keith Peacock RN  Outcome: Progressing     Problem: Safety - Adult  Goal: Free from fall injury  1/23/2024 1532 by Pratima Long RN  Outcome: Completed  1/23/2024 0453 by Keith Peacock RN  Outcome: Progressing     Problem: ABCDS Injury Assessment  Goal: Absence of physical injury  1/23/2024 1532 by Pratima Long RN  Outcome: Completed  1/23/2024 0453 by Keith Peacock RN  Outcome: Progressing     Problem: Cardiovascular - Adult  Goal: Maintains optimal cardiac output and hemodynamic stability  1/23/2024 1532 by Pratima Long RN  Outcome: Completed  1/23/2024 0453 by Keith Peacock RN  Outcome: Progressing  Goal: Absence of cardiac dysrhythmias or at baseline  1/23/2024 1532 by Pratima Long RN  Outcome: Completed  1/23/2024 0453 by Keith Peacock RN  Outcome: Progressing     
  Problem: Chronic Conditions and Co-morbidities  Goal: Patient's chronic conditions and co-morbidity symptoms are monitored and maintained or improved  Outcome: Progressing     Problem: Discharge Planning  Goal: Discharge to home or other facility with appropriate resources  Outcome: Progressing     Problem: Safety - Adult  Goal: Free from fall injury  Outcome: Progressing     Problem: ABCDS Injury Assessment  Goal: Absence of physical injury  Outcome: Progressing     Problem: Cardiovascular - Adult  Goal: Maintains optimal cardiac output and hemodynamic stability  Outcome: Progressing  Goal: Absence of cardiac dysrhythmias or at baseline  Outcome: Progressing     
Yes

## 2024-01-23 NOTE — DISCHARGE SUMMARY
[facility]  Discharge Summary  Patient ID:  Vinny Castillo  [id number] 68 y.o. 1955    Admit date: 1/22/2024    Discharge date: 1/23/24     Admitting Provider: Jose Cantu MD     Discharge Provider: JOLANTA Martinez - CNP     Admission Diagnoses: Chronic total occlusion of coronary artery [I25.82]  CAD in native artery [I25.10]    Discharge Diagnoses: Active Hospital Problems    Chronic total occlusion of coronary artery      CAD in native artery      Stage 3 chronic kidney disease (HCC)      Essential hypertension      Mixed hyperlipidemia      Discharged Condition: good  The patient was seen and examined on day of discharge and this discharge summary is in conjunction with any daily progress note from day of discharge.    Hospital Course: Vinny Castillo was admitted following cardiac catheterization for planned percutaneous coronary intervention to  of RCA.  The procedure was complicated and only able to complete PTA of the RCA.    Echocardiogram completed with normal LV function, normal wall motion, no pericardial effusion.     He has hx of MV CAD, HTN, HLD as well as CKD3 and hx of mesenteric vein thrombosis.  He had an abnormal stress test with mixed ischemia/ scar in inferior/ lateral, apical/lateral.  LHC showed MVCAD and was referred to CVTS for surgical revascularization.  He elected for high risk PCI.  Last LHC in May 2023 with closure of prior PTA to RCA/ .      He denies any chest pain or shortness of breath.  No complications from procedure.  He has been up ambulating in room and corona.   He notes not much bruising or bleeding despite being on aspirin and Brilinta.  He had previously been on Xarelto for mesenteric vein thrombosis followed by hematologist Dr. Mayfield.     Consults:  None  Physical Exam:  /79   Pulse 63   Temp 98.5 °F (36.9 °C) (Oral)   Resp 16   Ht 1.803 m (5' 11\")   Wt 87.1 kg (192 lb)   SpO2 98%   BMI 26.78 kg/m²     Intake/Output

## 2024-01-23 NOTE — PROGRESS NOTES
4 Eyes Skin Assessment     NAME:  Vinny Castillo  YOB: 1955  MEDICAL RECORD NUMBER:  [id number]    The patient is being assessed for  Admission    I agree that at least one RN has performed a thorough Head to Toe Skin Assessment on the patient. ALL assessment sites listed below have been assessed.      Areas assessed by both nurses:    Head, Face, Ears, Shoulders, Back, Chest, Arms, Elbows, Hands, Sacrum. Buttock, Coccyx, Ischium, Legs. Feet and Heels, and Under Medical Devices         Does the Patient have a Wound? No noted wound(s); right radial access site and right femoral groin access site, both with some bruising       Nemesio Prevention initiated by RN: No  Wound Care Orders initiated by RN: No    Pressure Injury (Stage 3,4, Unstageable, DTI, NWPT, and Complex wounds) if present, place Wound referral order by RN under : No    New Ostomies, if present place, Ostomy referral order under : No     Nurse 1 eSignature: Electronically signed by Keith Peacock RN on 1/22/24 at 8:23 PM EST    **SHARE this note so that the co-signing nurse can place an eSignature**    Nurse 2 eSignature: Electronically signed by Ivis Baltazar RN on 1/22/24 at 10:30 PM EST

## 2024-01-23 NOTE — ACP (ADVANCE CARE PLANNING)
Advance Care Planning     General Advance Care Planning (ACP) Conversation    Date of Conversation: 1/22/2024  Conducted with: Patient with Decision Making Capacity   Healthcare Decision Maker: Next of Kin by law (only applies in absence of above) (name) spouse    Healthcare Decision Maker:    Primary Decision Maker: Jackie Castillo - Spouse - [phone]  Click here to complete Healthcare Decision Makers including selection of the Healthcare Decision Maker Relationship (ie \"Primary\").  Today we documented Decision Maker(s) consistent with Legal Next of Kin hierarchy.    Content/Action Overview:  Has NO ACP documents/care preferences - information provided, considering goals and options  Reviewed DNR/DNI and patient elects Full Code (Attempt Resuscitation)      Length of Voluntary ACP Conversation in minutes:  <16 minutes (Non-Billable)    Lizeth Jessica RN

## 2024-01-24 LAB
POC ACT LR: 266 SEC
POC ACT LR: 282 SEC
POC ACT LR: 283 SEC
POC ACT LR: 295 SEC
POC ACT LR: 307 SEC
POC ACT LR: 316 SEC
POC ACT LR: 345 SEC
POC ACT LR: 357 SEC
POC ACT LR: 380 SEC
POC ACT LR: 393 SEC
POC ACT LR: 393 SEC
POC ACT LR: >400 SEC

## 2024-02-13 NOTE — PROGRESS NOTES
[facility]   Cardiac Evaluation    Primary Care Doctor:  Koko Singleton MD    Chief Complaint   Patient presents with    Follow-Up from Hospital     S/p PCI    Hypertension    Hyperlipidemia    Coronary Artery Disease        Assessment:    1. Coronary artery disease involving native coronary artery of native heart without angina pectoris    2. Essential hypertension    3. Mixed hyperlipidemia    4. Stage 3a chronic kidney disease (HCC)        Plan:   No change in current heart medicines  No other testing at this time  If you develop heart/ chest pain or shortness of breath let us know and can pursue further testing or possible another angiogram  Agree with increase in activity/ exercise.   Follow up with me in 4 months     Vitals:    02/14/24 0929   BP: 132/66   Pulse: 57   SpO2: 99%   Weight: 87.3 kg (192 lb 8 oz)   Height: 1.803 m (5' 11\")       Primary Cardiologist: Dr. Jose Cantu     History of Present Illness:   I had the pleasure of seeing Vinny Castillo (68 y.o.) in follow up for recent hospitalization.  He was admitted following cardiac catheterization for planned percutaneous coronary intervention to  of RCA.  The procedure was complicated and only able to complete PTA of the RCA.   Echocardiogram completed with normal LV function, normal wall motion, no pericardial effusion.    He has hx of MV CAD, HTN, HLD as well as CKD3 and hx of mesenteric vein thrombosis.  He had an abnormal stress test with mixed ischemia/ scar in inferior/ lateral, apical/lateral walls.  LHC showed MVCAD and was referred to CVTS for surgical revascularization.  He elected for high risk PCI.  Last LHC in May 2023 demonstrated closure of prior angioplasty of the RCA/ .    He had previously been on Xarelto for mesenteric vein thrombosis followed by hematologist Dr. Mayfield which was discontinued in favor of DAPT.     He is doing okay.  Denies any heart pain.   His weight is up here but was 183 lbs at home.

## 2024-02-14 ENCOUNTER — OFFICE VISIT (OUTPATIENT)
Dept: CARDIOLOGY CLINIC | Age: 69
End: 2024-02-14
Payer: MEDICARE

## 2024-02-14 VITALS
BODY MASS INDEX: 26.95 KG/M2 | HEART RATE: 57 BPM | WEIGHT: 192.5 LBS | DIASTOLIC BLOOD PRESSURE: 66 MMHG | HEIGHT: 71 IN | OXYGEN SATURATION: 99 % | SYSTOLIC BLOOD PRESSURE: 132 MMHG

## 2024-02-14 DIAGNOSIS — N18.31 STAGE 3A CHRONIC KIDNEY DISEASE (HCC): ICD-10-CM

## 2024-02-14 DIAGNOSIS — I10 ESSENTIAL HYPERTENSION: ICD-10-CM

## 2024-02-14 DIAGNOSIS — I25.10 CORONARY ARTERY DISEASE INVOLVING NATIVE CORONARY ARTERY OF NATIVE HEART WITHOUT ANGINA PECTORIS: Primary | ICD-10-CM

## 2024-02-14 DIAGNOSIS — E78.2 MIXED HYPERLIPIDEMIA: ICD-10-CM

## 2024-02-14 PROCEDURE — 1111F DSCHRG MED/CURRENT MED MERGE: CPT | Performed by: NURSE PRACTITIONER

## 2024-02-14 PROCEDURE — 3075F SYST BP GE 130 - 139MM HG: CPT | Performed by: NURSE PRACTITIONER

## 2024-02-14 PROCEDURE — G8484 FLU IMMUNIZE NO ADMIN: HCPCS | Performed by: NURSE PRACTITIONER

## 2024-02-14 PROCEDURE — 1036F TOBACCO NON-USER: CPT | Performed by: NURSE PRACTITIONER

## 2024-02-14 PROCEDURE — 3078F DIAST BP <80 MM HG: CPT | Performed by: NURSE PRACTITIONER

## 2024-02-14 PROCEDURE — 99214 OFFICE O/P EST MOD 30 MIN: CPT | Performed by: NURSE PRACTITIONER

## 2024-02-14 PROCEDURE — G8417 CALC BMI ABV UP PARAM F/U: HCPCS | Performed by: NURSE PRACTITIONER

## 2024-02-14 PROCEDURE — 1123F ACP DISCUSS/DSCN MKR DOCD: CPT | Performed by: NURSE PRACTITIONER

## 2024-02-14 PROCEDURE — G8427 DOCREV CUR MEDS BY ELIG CLIN: HCPCS | Performed by: NURSE PRACTITIONER

## 2024-02-14 PROCEDURE — 3017F COLORECTAL CA SCREEN DOC REV: CPT | Performed by: NURSE PRACTITIONER

## 2024-02-14 RX ORDER — CARVEDILOL 3.12 MG/1
3.12 TABLET ORAL 2 TIMES DAILY WITH MEALS
Qty: 180 TABLET | Refills: 1 | Status: SHIPPED | OUTPATIENT
Start: 2024-02-14

## 2024-02-14 NOTE — PATIENT INSTRUCTIONS
No change in current heart medicines  No other testing at this time  If you develop heart/ chest pain or shortness of breath let us know and can pursue further testing or possible another angiogram  Agree with increase in activity/ exercise.   Follow up with me in 4 months

## 2024-02-16 ENCOUNTER — HOSPITAL ENCOUNTER (OUTPATIENT)
Age: 69
Discharge: HOME | End: 2024-02-16
Payer: MEDICARE

## 2024-02-16 DIAGNOSIS — N18.31 STAGE 3A CHRONIC KIDNEY DISEASE (HCC): ICD-10-CM

## 2024-02-16 LAB
ALBUMIN SERPL-MCNC: 4.7 G/DL (ref 3.4–5)
ANION GAP SERPL CALCULATED.3IONS-SCNC: 10 MMOL/L (ref 3–16)
BUN SERPL-MCNC: 21 MG/DL (ref 7–20)
CALCIUM SERPL-MCNC: 8.9 MG/DL (ref 8.3–10.6)
CHLORIDE SERPL-SCNC: 101 MMOL/L (ref 99–110)
CO2 SERPL-SCNC: 27 MMOL/L (ref 21–32)
CREAT SERPL-MCNC: 1.1 MG/DL (ref 0.8–1.3)
CREAT UR-MCNC: 118.9 MG/DL (ref 39–259)
GFR SERPLBLD CREATININE-BSD FMLA CKD-EPI: >60 ML/MIN/{1.73_M2}
GLUCOSE SERPL-MCNC: 138 MG/DL (ref 70–99)
MICROALBUMIN UR DL<=1MG/L-MCNC: <1.2 MG/DL
MICROALBUMIN/CREAT UR: NORMAL MG/G (ref 0–30)
PHOSPHATE SERPL-MCNC: 4.4 MG/DL (ref 2.5–4.9)
POTASSIUM SERPL-SCNC: 4.4 MMOL/L (ref 3.5–5.1)
SODIUM SERPL-SCNC: 138 MMOL/L (ref 136–145)

## 2024-02-16 PROCEDURE — 80069 RENAL FUNCTION PANEL: CPT

## 2024-02-16 PROCEDURE — 82570 ASSAY OF URINE CREATININE: CPT

## 2024-02-16 PROCEDURE — 82043 UR ALBUMIN QUANTITATIVE: CPT

## 2024-02-16 PROCEDURE — 36415 COLL VENOUS BLD VENIPUNCTURE: CPT

## 2024-04-25 NOTE — ADDENDUM NOTE
Addendum  created 01/22/24 9088 by Stephan Santizo APRN - CRNA    Flowsheet accepted, Intraprocedure Flowsheets edited, Intraprocedure Meds edited, Orders acknowledged in Narrator, SmartForm saved      
25-Apr-2024 18:30

## 2024-06-17 ENCOUNTER — OFFICE VISIT (OUTPATIENT)
Dept: CARDIOLOGY CLINIC | Age: 69
End: 2024-06-17
Payer: MEDICARE

## 2024-06-17 VITALS
BODY MASS INDEX: 26.04 KG/M2 | OXYGEN SATURATION: 99 % | DIASTOLIC BLOOD PRESSURE: 62 MMHG | WEIGHT: 186 LBS | HEIGHT: 71 IN | SYSTOLIC BLOOD PRESSURE: 110 MMHG | HEART RATE: 55 BPM

## 2024-06-17 DIAGNOSIS — I49.9 IRREGULAR HEART RATE: ICD-10-CM

## 2024-06-17 DIAGNOSIS — N18.31 STAGE 3A CHRONIC KIDNEY DISEASE (HCC): ICD-10-CM

## 2024-06-17 DIAGNOSIS — K55.069 MESENTERIC VEIN THROMBOSIS (HCC): ICD-10-CM

## 2024-06-17 DIAGNOSIS — E78.2 MIXED HYPERLIPIDEMIA: ICD-10-CM

## 2024-06-17 DIAGNOSIS — I25.10 CORONARY ARTERY DISEASE INVOLVING NATIVE CORONARY ARTERY OF NATIVE HEART WITHOUT ANGINA PECTORIS: Primary | ICD-10-CM

## 2024-06-17 DIAGNOSIS — I10 ESSENTIAL HYPERTENSION: ICD-10-CM

## 2024-06-17 DIAGNOSIS — N18.31 TYPE 2 DIABETES MELLITUS WITH STAGE 3A CHRONIC KIDNEY DISEASE, WITHOUT LONG-TERM CURRENT USE OF INSULIN (HCC): ICD-10-CM

## 2024-06-17 DIAGNOSIS — E11.22 TYPE 2 DIABETES MELLITUS WITH STAGE 3A CHRONIC KIDNEY DISEASE, WITHOUT LONG-TERM CURRENT USE OF INSULIN (HCC): ICD-10-CM

## 2024-06-17 PROCEDURE — 3046F HEMOGLOBIN A1C LEVEL >9.0%: CPT | Performed by: NURSE PRACTITIONER

## 2024-06-17 PROCEDURE — 1036F TOBACCO NON-USER: CPT | Performed by: NURSE PRACTITIONER

## 2024-06-17 PROCEDURE — G8417 CALC BMI ABV UP PARAM F/U: HCPCS | Performed by: NURSE PRACTITIONER

## 2024-06-17 PROCEDURE — 3017F COLORECTAL CA SCREEN DOC REV: CPT | Performed by: NURSE PRACTITIONER

## 2024-06-17 PROCEDURE — G8427 DOCREV CUR MEDS BY ELIG CLIN: HCPCS | Performed by: NURSE PRACTITIONER

## 2024-06-17 PROCEDURE — 3074F SYST BP LT 130 MM HG: CPT | Performed by: NURSE PRACTITIONER

## 2024-06-17 PROCEDURE — 2022F DILAT RTA XM EVC RTNOPTHY: CPT | Performed by: NURSE PRACTITIONER

## 2024-06-17 PROCEDURE — 99214 OFFICE O/P EST MOD 30 MIN: CPT | Performed by: NURSE PRACTITIONER

## 2024-06-17 PROCEDURE — 1123F ACP DISCUSS/DSCN MKR DOCD: CPT | Performed by: NURSE PRACTITIONER

## 2024-06-17 PROCEDURE — 3078F DIAST BP <80 MM HG: CPT | Performed by: NURSE PRACTITIONER

## 2024-06-17 NOTE — PATIENT INSTRUCTIONS
Finish out the Brilinta.  Will review with Dr. Cantu to see if you need a different antiplatelet since you do not have any stents  Okay to hold the Brilinta for 5-7 days prior to surgery/ procedures  No change in other heart medicines (aspirin, atorvastatin, carvedilol, amlodipine)  Follow up with Dr. Cantu in 4 months

## 2024-06-17 NOTE — PROGRESS NOTES
[facility]   Cardiac Evaluation    Primary Care Doctor:  Koko Singleton MD    Chief Complaint   Patient presents with    4 month follow up    Hypertension    Hyperlipidemia    Coronary Artery Disease        Assessment:    1. Coronary artery disease involving native coronary artery of native heart without angina pectoris    2. Essential hypertension    3. Mixed hyperlipidemia    4. Stage 3a chronic kidney disease (HCC)    5. Mesenteric vein thrombosis (HCC)    6. Type 2 diabetes mellitus with stage 3a chronic kidney disease, without long-term current use of insulin (HCC)    7. Irregular heart rate        Plan:   Finish out the Brilinta.  Will review with Dr. Cantu to see if you need a different antiplatelet since you do not have any stents  Okay to hold the Brilinta for 5-7 days prior to surgery/ procedures  No change in other heart medicines (aspirin, atorvastatin, carvedilol, amlodipine)  Follow up with Dr. Cantu in 4 months     Vitals:    06/17/24 0944   BP: 110/62   Pulse: 55   SpO2: 99%   Weight: 84.4 kg (186 lb)   Height: 1.803 m (5' 11\")       Primary Cardiologist: Dr. Jose Cantu     History of Present Illness:   I had the pleasure of seeing Vinny Castillo (68 y.o.) in follow up for multivessel CAD including  of the RCA with prior angioplasty, HTN, HLD as well as CKD3, T2DM and hx of mesenteric vein thrombosis.   He is seeing orthopedic tomorrow for right knee.  Not sure if will need surgery might be able to gel/ injection.  Had MRI.   Knee pain/ movement better with wearing knee brace.   His weight is down ~ 6 lbs since Feb.  At home stays about 180 lbs.   BP at home 120-130/ 70's. HR at home - 58-62.     Denies any chest pain or shortness of breath.  Has to go slower up incline when walking dogs due to right knee pain/ limitations.       Vinny Castillo describes symptoms including right knee pain/ limiations but denies chest pain, dyspnea, palpitations, orthopnea, PND, fatigue, early

## 2024-06-28 ENCOUNTER — TELEPHONE (OUTPATIENT)
Dept: CARDIOLOGY CLINIC | Age: 69
End: 2024-06-28

## 2024-06-28 NOTE — TELEPHONE ENCOUNTER
Patient is at acceptable (intermediate) cv risk for planned procedure/surgery.  It would be okay to hold Brilinta for 1 week before the procedure, would recommend resuming this within 1 week after procedure.  Thank you

## 2024-06-28 NOTE — TELEPHONE ENCOUNTER
CARDIAC CLEARANCE REQUEST    What type of procedure are you having: R KNEE SCOPE    Are you taking any blood thinners: BRILINTA   HOLD FOR ONE WEEK  Type on anesthesia: GENERAL 45MIN    When is your procedure scheduled for: 07/11/24    What physician is performing your procedure: DR. BRYSON SAPP    Phone Number: [phone] OPT [address]    Fax number to send the letter: [phone]

## 2024-07-24 ENCOUNTER — PATIENT MESSAGE (OUTPATIENT)
Dept: CARDIOLOGY CLINIC | Age: 69
End: 2024-07-24

## 2024-07-25 NOTE — TELEPHONE ENCOUNTER
From: Vinny Castillo  Sent: 7/24/2024 3:35 PM EDT  To: [facility]  Subject: medication    I have 21 Brilinta left, 1 for tonight and 10 more days. I should be done on August 3rd.    Knee surgery went very well. I did not need PT and I did not need to use crutches at all, I walked out of the surgery and only had a little inflammation. I see Dr. Carver to be released on July 31st.

## 2024-07-31 RX ORDER — CLOPIDOGREL BISULFATE 75 MG/1
75 TABLET ORAL DAILY
Qty: 90 TABLET | Refills: 3 | Status: SHIPPED | OUTPATIENT
Start: 2024-07-31

## 2024-08-08 RX ORDER — CARVEDILOL 3.12 MG/1
3.12 TABLET ORAL 2 TIMES DAILY WITH MEALS
Qty: 180 TABLET | Refills: 0 | Status: SHIPPED | OUTPATIENT
Start: 2024-08-08

## 2024-08-13 ENCOUNTER — HOSPITAL ENCOUNTER (OUTPATIENT)
Age: 69
Discharge: HOME | End: 2024-08-13
Payer: MEDICARE

## 2024-08-13 DIAGNOSIS — N18.31 STAGE 3A CHRONIC KIDNEY DISEASE (HCC): ICD-10-CM

## 2024-08-13 LAB
ALBUMIN SERPL-MCNC: 4.5 G/DL (ref 3.4–5)
ANION GAP SERPL CALCULATED.3IONS-SCNC: 13 MMOL/L (ref 3–16)
BUN SERPL-MCNC: 21 MG/DL (ref 7–20)
CALCIUM SERPL-MCNC: 9.6 MG/DL (ref 8.3–10.6)
CHLORIDE SERPL-SCNC: 103 MMOL/L (ref 99–110)
CO2 SERPL-SCNC: 26 MMOL/L (ref 21–32)
CREAT SERPL-MCNC: 1.2 MG/DL (ref 0.8–1.3)
CREAT UR-MCNC: 93.9 MG/DL (ref 39–259)
GFR SERPLBLD CREATININE-BSD FMLA CKD-EPI: 65 ML/MIN/{1.73_M2}
GLUCOSE SERPL-MCNC: 182 MG/DL (ref 70–99)
MICROALBUMIN UR DL<=1MG/L-MCNC: <1.2 MG/DL
MICROALBUMIN/CREAT UR: NORMAL MG/G (ref 0–30)
PHOSPHATE SERPL-MCNC: 4 MG/DL (ref 2.5–4.9)
POTASSIUM SERPL-SCNC: 4.8 MMOL/L (ref 3.5–5.1)
SODIUM SERPL-SCNC: 142 MMOL/L (ref 136–145)

## 2024-08-13 PROCEDURE — 82570 ASSAY OF URINE CREATININE: CPT

## 2024-08-13 PROCEDURE — 82043 UR ALBUMIN QUANTITATIVE: CPT

## 2024-08-13 PROCEDURE — 80069 RENAL FUNCTION PANEL: CPT

## 2024-08-13 PROCEDURE — 36415 COLL VENOUS BLD VENIPUNCTURE: CPT

## 2024-09-02 DIAGNOSIS — I10 ESSENTIAL HYPERTENSION: ICD-10-CM

## 2024-09-03 RX ORDER — AMLODIPINE BESYLATE 5 MG/1
5 TABLET ORAL DAILY
Qty: 90 TABLET | Refills: 2 | Status: SHIPPED | OUTPATIENT
Start: 2024-09-03

## 2024-10-21 NOTE — PROGRESS NOTES
artery    6. Mixed hyperlipidemia       Plan   Continue current cardiac medications: Amlodipine 5 mg , Aspirin 81 mg daily for cad/ashd, Lipitor 40 mg, Carvedilol 3.125 mg, Pavix 75 mg  Medications reviewed. Medications refilled as warranted.   Check your BP at home daily; report numbers that are consecutively above 150/90.   No cardiac testing warranted today  Blood work: Lipid, lft- fast for 8 hours prior to obtaining blood work: get done in January 2025.   Follow up with GINGER Reardon in 1 year or sooner if needed.         Scribe's attestation: This note was scribed in the presence of Dr. Jose Cantu M.D. By Melissa Silveira RN     I, Dr Jose Cantu, personally performed the services described in this documentation, as scribed by the above signed scribe in my presence.  It is both accurate and complete to my knowledge.  I agree with the details independently gathered by the clinical support staff and the scribed note accurately describes my personal service to the patient.      Thank you for allowing us to participate in the care of Vinny Castillo. Please call me with any questions [phone].    Jose Cantu MD, [facility]   Interventional Cardiologist  [facility]  [phone] [place] Office  [phone] [place] Office  10/22/2024 10:07 AM    I will address the patient's cardiac risk factors and adjusted pharmacologic treatment as needed. In addition, I have reinforced the need for patient directed risk factor modification.  Tobacco use was discussed with the patient and educated on the negative effects and was asked not to use. All questions and concerns were addressed to the patient/family. Alternatives to my treatment were discussed.

## 2024-10-22 ENCOUNTER — OFFICE VISIT (OUTPATIENT)
Dept: CARDIOLOGY CLINIC | Age: 69
End: 2024-10-22
Payer: MEDICARE

## 2024-10-22 VITALS
HEART RATE: 55 BPM | DIASTOLIC BLOOD PRESSURE: 66 MMHG | HEIGHT: 71 IN | BODY MASS INDEX: 26.39 KG/M2 | OXYGEN SATURATION: 97 % | WEIGHT: 188.5 LBS | SYSTOLIC BLOOD PRESSURE: 144 MMHG

## 2024-10-22 DIAGNOSIS — I10 ESSENTIAL HYPERTENSION: ICD-10-CM

## 2024-10-22 DIAGNOSIS — I25.82 CHRONIC TOTAL OCCLUSION OF CORONARY ARTERY: ICD-10-CM

## 2024-10-22 DIAGNOSIS — I25.10 CORONARY ARTERY DISEASE INVOLVING NATIVE CORONARY ARTERY OF NATIVE HEART WITHOUT ANGINA PECTORIS: Primary | ICD-10-CM

## 2024-10-22 DIAGNOSIS — R01.1 MURMUR: ICD-10-CM

## 2024-10-22 DIAGNOSIS — I25.10 CAD IN NATIVE ARTERY: ICD-10-CM

## 2024-10-22 DIAGNOSIS — E78.2 MIXED HYPERLIPIDEMIA: ICD-10-CM

## 2024-10-22 PROCEDURE — 3078F DIAST BP <80 MM HG: CPT | Performed by: INTERNAL MEDICINE

## 2024-10-22 PROCEDURE — 1123F ACP DISCUSS/DSCN MKR DOCD: CPT | Performed by: INTERNAL MEDICINE

## 2024-10-22 PROCEDURE — G8484 FLU IMMUNIZE NO ADMIN: HCPCS | Performed by: INTERNAL MEDICINE

## 2024-10-22 PROCEDURE — 3017F COLORECTAL CA SCREEN DOC REV: CPT | Performed by: INTERNAL MEDICINE

## 2024-10-22 PROCEDURE — 99214 OFFICE O/P EST MOD 30 MIN: CPT | Performed by: INTERNAL MEDICINE

## 2024-10-22 PROCEDURE — 1036F TOBACCO NON-USER: CPT | Performed by: INTERNAL MEDICINE

## 2024-10-22 PROCEDURE — 3077F SYST BP >= 140 MM HG: CPT | Performed by: INTERNAL MEDICINE

## 2024-10-22 PROCEDURE — G8427 DOCREV CUR MEDS BY ELIG CLIN: HCPCS | Performed by: INTERNAL MEDICINE

## 2024-10-22 PROCEDURE — G8417 CALC BMI ABV UP PARAM F/U: HCPCS | Performed by: INTERNAL MEDICINE

## 2024-10-22 NOTE — PATIENT INSTRUCTIONS
Continue current cardiac medications: Amlodipine 5 mg, Aspirin 81 mg, Lipitor 40 mg, Carvedilol 3.125 mg, Pavix 75 mg  Medications reviewed. Medications refilled as warranted.   Check your BP at home daily; report numbers that are consecutively above 150/90.   No cardiac testing warranted today  Blood work: Lipid, lft- fast for 8 hours prior to obtaining blood work: get done in January 2025.

## 2024-12-16 RX ORDER — CARVEDILOL 3.12 MG/1
3.12 TABLET ORAL 2 TIMES DAILY WITH MEALS
Qty: 180 TABLET | Refills: 1 | Status: SHIPPED | OUTPATIENT
Start: 2024-12-16

## 2024-12-16 NOTE — TELEPHONE ENCOUNTER
Last Office Visit: 10/22/2024 Provider: SHAQUILLE  **Is provider OOT? No    Next Office Visit: Visit date not found Provider: SHAQUILLE  **If no OV, when does pt need to be seen? in 1 year(s) 10/22/25    Lab orders needed? no   Encounter provider correct? No If not, change provider  Script changes since last refill? no    LAST LABS:   BMP:   Lab Results   Component Value Date/Time     08/13/2024 09:11 AM    K 4.8 08/13/2024 09:11 AM    K 3.8 01/22/2024 07:11 AM     08/13/2024 09:11 AM    CO2 26 08/13/2024 09:11 AM    BUN 21 08/13/2024 09:11 AM    CREATININE 1.2 08/13/2024 09:11 AM    GLUCOSE 182 08/13/2024 09:11 AM    CALCIUM 9.6 08/13/2024 09:11 AM    LABGLOM 65 08/13/2024 09:11 AM    LABGLOM >60 02/16/2024 11:55 AM

## 2024-12-23 ENCOUNTER — PATIENT MESSAGE (OUTPATIENT)
Dept: CARDIOLOGY CLINIC | Age: 69
End: 2024-12-23

## 2025-03-03 DIAGNOSIS — E78.2 MIXED HYPERLIPIDEMIA: ICD-10-CM

## 2025-03-03 LAB
ALBUMIN SERPL-MCNC: 4.8 G/DL (ref 3.4–5)
ALP SERPL-CCNC: 54 U/L (ref 40–129)
ALT SERPL-CCNC: 38 U/L (ref 10–40)
AST SERPL-CCNC: 32 U/L (ref 15–37)
BILIRUB DIRECT SERPL-MCNC: 0.5 MG/DL (ref 0–0.3)
BILIRUB INDIRECT SERPL-MCNC: 0.9 MG/DL (ref 0–1)
BILIRUB SERPL-MCNC: 1.4 MG/DL (ref 0–1)
CHOLEST SERPL-MCNC: 120 MG/DL (ref 0–199)
HDLC SERPL-MCNC: 42 MG/DL (ref 40–60)
LDL CHOLESTEROL: 50 MG/DL
PROT SERPL-MCNC: 7.4 G/DL (ref 6.4–8.2)
TRIGL SERPL-MCNC: 142 MG/DL (ref 0–150)
VLDLC SERPL CALC-MCNC: 28 MG/DL

## 2025-06-08 DIAGNOSIS — I10 ESSENTIAL HYPERTENSION: ICD-10-CM

## 2025-06-09 RX ORDER — AMLODIPINE BESYLATE 5 MG/1
5 TABLET ORAL DAILY
Qty: 90 TABLET | Refills: 0 | Status: SHIPPED | OUTPATIENT
Start: 2025-06-09

## 2025-06-09 RX ORDER — CARVEDILOL 3.12 MG/1
3.12 TABLET ORAL 2 TIMES DAILY WITH MEALS
Qty: 180 TABLET | Refills: 0 | Status: SHIPPED | OUTPATIENT
Start: 2025-06-09

## 2025-06-09 NOTE — TELEPHONE ENCOUNTER
Last Office Visit: 10/22/2024 Provider: SHAQUILLE  **Is provider OOT? Yes    **If no OV, when does pt need to be seen? in 4 month(s) with NPDD  **Has patient already had 30 day supply? No    Lab orders needed? no   Encounter provider correct? Yes If not, change provider  Script changes since last refill? no    LAST LABS:   CBC:  Lab Results   Component Value Date    WBC 6.0 05/07/2025    HGB 16.4 05/07/2025    HCT 48.6 05/07/2025    MCV 86.8 05/07/2025     05/07/2025    LYMPHOPCT 20.0 01/08/2025    RBC 5.60 05/07/2025    MCH 29.4 05/07/2025    MCHC 33.9 05/07/2025    RDW 14.0 05/07/2025           CMP:  Lab Results   Component Value Date     05/07/2025    K 4.1 05/07/2025     05/07/2025    CO2 23 05/07/2025    BUN 18 05/07/2025    CREATININE 1.1 05/07/2025    GLUCOSE 158 (H) 05/07/2025    CALCIUM 9.3 05/07/2025    BILITOT 1.3 (H) 05/07/2025    ALKPHOS 57 05/07/2025    AST 32 05/07/2025    ALT 39 05/07/2025    LABGLOM 72 05/07/2025    GFRAA >60 10/04/2022    AGRATIO 2.2 05/07/2025    GLOB 2.4 03/30/2021

## (undated) DEVICE — SUTURE ETHBND EXCEL SZ 0 L18IN NONABSORBABLE GRN L22MM MO-7 CX41D

## (undated) DEVICE — INDICATED FOR USE DURING OPEN AND LAPAROSCOPIC CHOLECYSTECTOMY PROCEDURES TO INJECT RADIOPAQUE MEDIA THROUGH THE CYSTIC DUCT INTO THE BILIARY TREE.: Brand: AEROSTAT®

## (undated) DEVICE — 35 ML SYRINGE LUER-LOCK TIP: Brand: MONOJECT

## (undated) DEVICE — DRAPE C ARM W46XL120IN XLN

## (undated) DEVICE — PUMP SUC IRR TBNG L10FT W/ HNDPC ASSEMB STRYKEFLOW 2

## (undated) DEVICE — Z INACTIVE USE 2641839 CLIP INT M L POLYMER LOK LIG HEM O LOK

## (undated) DEVICE — PENCIL ES CRD L10FT HND SWCHING ROCK SWCH W/ EDGE COAT BLDE

## (undated) DEVICE — TISSUE RETRIEVAL SYSTEM: Brand: INZII RETRIEVAL SYSTEM

## (undated) DEVICE — GLOVE,SURG,SENSICARE SLT,LF,PF,7.5: Brand: MEDLINE

## (undated) DEVICE — Device

## (undated) DEVICE — TROCAR: Brand: KII SLEEVE